# Patient Record
Sex: FEMALE | Race: WHITE | Employment: FULL TIME | URBAN - METROPOLITAN AREA
[De-identification: names, ages, dates, MRNs, and addresses within clinical notes are randomized per-mention and may not be internally consistent; named-entity substitution may affect disease eponyms.]

---

## 2017-01-12 RX ORDER — VALACYCLOVIR HYDROCHLORIDE 1 G/1
TABLET, FILM COATED ORAL
Qty: 36 TABLET | Refills: 1 | Status: SHIPPED | OUTPATIENT
Start: 2017-01-12 | End: 2018-03-12 | Stop reason: SDUPTHER

## 2017-01-25 ENCOUNTER — OFFICE VISIT (OUTPATIENT)
Dept: FAMILY MEDICINE CLINIC | Age: 57
End: 2017-01-25

## 2017-01-25 VITALS
OXYGEN SATURATION: 96 % | TEMPERATURE: 97.5 F | BODY MASS INDEX: 25.17 KG/M2 | HEIGHT: 70 IN | HEART RATE: 72 BPM | DIASTOLIC BLOOD PRESSURE: 62 MMHG | SYSTOLIC BLOOD PRESSURE: 104 MMHG | WEIGHT: 175.8 LBS

## 2017-01-25 DIAGNOSIS — B02.9 HERPES ZOSTER WITHOUT COMPLICATION: Primary | ICD-10-CM

## 2017-01-25 DIAGNOSIS — N95.2 VAGINAL ATROPHY: ICD-10-CM

## 2017-01-25 PROCEDURE — 99213 OFFICE O/P EST LOW 20 MIN: CPT | Performed by: NURSE PRACTITIONER

## 2017-01-25 RX ORDER — ESTRADIOL 10 UG/1
10 INSERT VAGINAL
Qty: 24 TABLET | Refills: 3 | Status: SHIPPED | OUTPATIENT
Start: 2017-01-26 | End: 2017-12-27 | Stop reason: ALTCHOICE

## 2017-01-25 RX ORDER — VALACYCLOVIR HYDROCHLORIDE 1 G/1
1000 TABLET, FILM COATED ORAL 3 TIMES DAILY
Qty: 21 TABLET | Refills: 0 | Status: SHIPPED | OUTPATIENT
Start: 2017-01-25 | End: 2017-02-01

## 2017-01-26 ASSESSMENT — ENCOUNTER SYMPTOMS
EYE PAIN: 0
EYES NEGATIVE: 1
SORE THROAT: 0
DIARRHEA: 0
VOMITING: 0
ALLERGIC/IMMUNOLOGIC NEGATIVE: 1
COUGH: 0
NAIL CHANGES: 0
GASTROINTESTINAL NEGATIVE: 1
RESPIRATORY NEGATIVE: 1
SHORTNESS OF BREATH: 0
RHINORRHEA: 0

## 2017-02-15 ENCOUNTER — OFFICE VISIT (OUTPATIENT)
Dept: FAMILY MEDICINE CLINIC | Age: 57
End: 2017-02-15

## 2017-02-15 VITALS
OXYGEN SATURATION: 94 % | BODY MASS INDEX: 24.71 KG/M2 | HEART RATE: 78 BPM | HEIGHT: 70 IN | WEIGHT: 172.6 LBS | SYSTOLIC BLOOD PRESSURE: 110 MMHG | TEMPERATURE: 97.9 F | DIASTOLIC BLOOD PRESSURE: 64 MMHG

## 2017-02-15 DIAGNOSIS — J10.1 INFLUENZA A: Primary | ICD-10-CM

## 2017-02-15 DIAGNOSIS — J40 BRONCHITIS: ICD-10-CM

## 2017-02-15 LAB
INFLUENZA A ANTIBODY: POSITIVE
INFLUENZA B ANTIBODY: NEGATIVE

## 2017-02-15 PROCEDURE — 87804 INFLUENZA ASSAY W/OPTIC: CPT | Performed by: NURSE PRACTITIONER

## 2017-02-15 PROCEDURE — 94150 VITAL CAPACITY TEST: CPT | Performed by: NURSE PRACTITIONER

## 2017-02-15 PROCEDURE — 94640 AIRWAY INHALATION TREATMENT: CPT | Performed by: NURSE PRACTITIONER

## 2017-02-15 PROCEDURE — 99213 OFFICE O/P EST LOW 20 MIN: CPT | Performed by: NURSE PRACTITIONER

## 2017-02-15 RX ORDER — BROMPHENIRAMINE MALEATE, PSEUDOEPHEDRINE HYDROCHLORIDE, AND DEXTROMETHORPHAN HYDROBROMIDE 2; 30; 10 MG/5ML; MG/5ML; MG/5ML
5 SYRUP ORAL 4 TIMES DAILY PRN
Qty: 200 ML | Refills: 0 | Status: SHIPPED | OUTPATIENT
Start: 2017-02-15 | End: 2017-04-11 | Stop reason: ALTCHOICE

## 2017-02-15 RX ORDER — AZITHROMYCIN 250 MG/1
TABLET, FILM COATED ORAL
Qty: 1 PACKET | Refills: 0 | Status: SHIPPED | OUTPATIENT
Start: 2017-02-15 | End: 2017-02-25

## 2017-02-15 RX ORDER — METHYLPREDNISOLONE 4 MG/1
TABLET ORAL
Qty: 1 KIT | Refills: 0 | Status: SHIPPED | OUTPATIENT
Start: 2017-02-15 | End: 2017-02-21

## 2017-02-15 RX ORDER — ALBUTEROL SULFATE 2.5 MG/3ML
2.5 SOLUTION RESPIRATORY (INHALATION) ONCE
Status: COMPLETED | OUTPATIENT
Start: 2017-02-15 | End: 2017-02-15

## 2017-02-15 RX ORDER — ALBUTEROL SULFATE 90 UG/1
2 AEROSOL, METERED RESPIRATORY (INHALATION) EVERY 6 HOURS PRN
Qty: 1 INHALER | Refills: 3 | Status: SHIPPED | OUTPATIENT
Start: 2017-02-15 | End: 2017-12-27 | Stop reason: ALTCHOICE

## 2017-02-15 RX ADMIN — ALBUTEROL SULFATE 2.5 MG: 2.5 SOLUTION RESPIRATORY (INHALATION) at 13:00

## 2017-02-15 ASSESSMENT — ENCOUNTER SYMPTOMS
CHEST TIGHTNESS: 1
ALLERGIC/IMMUNOLOGIC NEGATIVE: 1
EYES NEGATIVE: 1
VOMITING: 0
SORE THROAT: 0
WHEEZING: 0
COUGH: 1
SHORTNESS OF BREATH: 1
SINUS PAIN: 1
ABDOMINAL PAIN: 0
NAUSEA: 0
DIARRHEA: 0
RHINORRHEA: 1
GASTROINTESTINAL NEGATIVE: 1
SWOLLEN GLANDS: 0

## 2017-03-10 ENCOUNTER — PATIENT MESSAGE (OUTPATIENT)
Dept: FAMILY MEDICINE CLINIC | Age: 57
End: 2017-03-10

## 2017-03-12 RX ORDER — CIPROFLOXACIN 500 MG/1
500 TABLET, FILM COATED ORAL 2 TIMES DAILY
Qty: 14 TABLET | Refills: 0 | Status: SHIPPED | OUTPATIENT
Start: 2017-03-12 | End: 2017-03-19

## 2017-03-12 RX ORDER — DIPHENOXYLATE HYDROCHLORIDE AND ATROPINE SULFATE 2.5; .025 MG/1; MG/1
1 TABLET ORAL 4 TIMES DAILY PRN
Qty: 60 TABLET | Refills: 0 | Status: SHIPPED | OUTPATIENT
Start: 2017-03-12 | End: 2017-04-11 | Stop reason: ALTCHOICE

## 2017-03-13 ENCOUNTER — OFFICE VISIT (OUTPATIENT)
Dept: GYNECOLOGY | Age: 57
End: 2017-03-13

## 2017-03-13 VITALS
SYSTOLIC BLOOD PRESSURE: 114 MMHG | WEIGHT: 172 LBS | BODY MASS INDEX: 25.48 KG/M2 | TEMPERATURE: 98.7 F | HEART RATE: 58 BPM | RESPIRATION RATE: 17 BRPM | HEIGHT: 69 IN | DIASTOLIC BLOOD PRESSURE: 70 MMHG

## 2017-03-13 DIAGNOSIS — Z01.419 WELL WOMAN EXAM WITH ROUTINE GYNECOLOGICAL EXAM: Primary | ICD-10-CM

## 2017-03-13 DIAGNOSIS — Z78.0 MENOPAUSE: ICD-10-CM

## 2017-03-13 PROCEDURE — 99396 PREV VISIT EST AGE 40-64: CPT | Performed by: OBSTETRICS & GYNECOLOGY

## 2017-03-15 LAB
HPV COMMENT: NORMAL
HPV TYPE 16: NOT DETECTED
HPV TYPE 18: NOT DETECTED
HPVOH (OTHER TYPES): NOT DETECTED

## 2017-03-19 ASSESSMENT — ENCOUNTER SYMPTOMS
CHOKING: 1
GASTROINTESTINAL NEGATIVE: 1
EYES NEGATIVE: 1

## 2017-04-11 ENCOUNTER — HOSPITAL ENCOUNTER (OUTPATIENT)
Dept: MAMMOGRAPHY | Age: 57
Discharge: HOME | End: 2017-04-11
Attending: FAMILY MEDICINE | Admitting: FAMILY MEDICINE

## 2017-04-11 ENCOUNTER — OFFICE VISIT (OUTPATIENT)
Dept: FAMILY MEDICINE CLINIC | Age: 57
End: 2017-04-11

## 2017-04-11 VITALS
DIASTOLIC BLOOD PRESSURE: 70 MMHG | BODY MASS INDEX: 23.96 KG/M2 | HEART RATE: 116 BPM | HEIGHT: 70 IN | OXYGEN SATURATION: 99 % | SYSTOLIC BLOOD PRESSURE: 106 MMHG | WEIGHT: 167.4 LBS | TEMPERATURE: 98.1 F

## 2017-04-11 DIAGNOSIS — Z00.00 ROUTINE GENERAL MEDICAL EXAMINATION AT A HEALTH CARE FACILITY: ICD-10-CM

## 2017-04-11 DIAGNOSIS — E55.9 VITAMIN D DEFICIENCY: ICD-10-CM

## 2017-04-11 DIAGNOSIS — R82.90 ABNORMAL URINE FINDINGS: ICD-10-CM

## 2017-04-11 DIAGNOSIS — E78.2 MIXED HYPERLIPIDEMIA: ICD-10-CM

## 2017-04-11 DIAGNOSIS — R05.3 CHRONIC COUGH: ICD-10-CM

## 2017-04-11 DIAGNOSIS — Z12.31 SCREENING MAMMOGRAM, ENCOUNTER FOR: ICD-10-CM

## 2017-04-11 DIAGNOSIS — M22.42 CHONDROMALACIA PATELLAE, LEFT KNEE: ICD-10-CM

## 2017-04-11 DIAGNOSIS — N95.2 ATROPHIC VAGINITIS: ICD-10-CM

## 2017-04-11 DIAGNOSIS — B00.1 RECURRENT COLD SORES: ICD-10-CM

## 2017-04-11 DIAGNOSIS — J30.1 SEASONAL ALLERGIC RHINITIS DUE TO POLLEN: ICD-10-CM

## 2017-04-11 DIAGNOSIS — Z00.00 ROUTINE GENERAL MEDICAL EXAMINATION AT A HEALTH CARE FACILITY: Primary | ICD-10-CM

## 2017-04-11 LAB
A/G RATIO: 1.7 (ref 1.1–2.2)
ALBUMIN SERPL-MCNC: 4.5 G/DL (ref 3.4–5)
ALP BLD-CCNC: 60 U/L (ref 40–129)
ALT SERPL-CCNC: 38 U/L (ref 10–40)
ANION GAP SERPL CALCULATED.3IONS-SCNC: 18 MMOL/L (ref 3–16)
AST SERPL-CCNC: 28 U/L (ref 15–37)
BASOPHILS ABSOLUTE: 0 K/UL (ref 0–0.2)
BASOPHILS RELATIVE PERCENT: 0.7 %
BILIRUB SERPL-MCNC: 0.7 MG/DL (ref 0–1)
BILIRUBIN, POC: NORMAL
BLOOD URINE, POC: NORMAL
BUN BLDV-MCNC: 15 MG/DL (ref 7–20)
CALCIUM SERPL-MCNC: 10.3 MG/DL (ref 8.3–10.6)
CHLORIDE BLD-SCNC: 100 MMOL/L (ref 99–110)
CHOLESTEROL, TOTAL: 162 MG/DL (ref 0–199)
CLARITY, POC: NORMAL
CO2: 25 MMOL/L (ref 21–32)
COLOR, POC: YELLOW
CREAT SERPL-MCNC: 0.8 MG/DL (ref 0.6–1.1)
EOSINOPHILS ABSOLUTE: 0.1 K/UL (ref 0–0.6)
EOSINOPHILS RELATIVE PERCENT: 1.3 %
GFR AFRICAN AMERICAN: >60
GFR NON-AFRICAN AMERICAN: >60
GLOBULIN: 2.7 G/DL
GLUCOSE BLD-MCNC: 81 MG/DL (ref 70–99)
GLUCOSE URINE, POC: NORMAL
HCT VFR BLD CALC: 42.8 % (ref 36–48)
HDLC SERPL-MCNC: 42 MG/DL (ref 40–60)
HEMOGLOBIN: 13.9 G/DL (ref 12–16)
KETONES, POC: NORMAL
LDL CHOLESTEROL CALCULATED: 99 MG/DL
LEUKOCYTE EST, POC: NORMAL
LYMPHOCYTES ABSOLUTE: 1.1 K/UL (ref 1–5.1)
LYMPHOCYTES RELATIVE PERCENT: 23.5 %
MCH RBC QN AUTO: 29.9 PG (ref 26–34)
MCHC RBC AUTO-ENTMCNC: 32.6 G/DL (ref 31–36)
MCV RBC AUTO: 91.6 FL (ref 80–100)
MONOCYTES ABSOLUTE: 0.4 K/UL (ref 0–1.3)
MONOCYTES RELATIVE PERCENT: 8.4 %
NEUTROPHILS ABSOLUTE: 3 K/UL (ref 1.7–7.7)
NEUTROPHILS RELATIVE PERCENT: 66.1 %
NITRITE, POC: NORMAL
PDW BLD-RTO: 13.2 % (ref 12.4–15.4)
PH, POC: 6
PLATELET # BLD: 271 K/UL (ref 135–450)
PMV BLD AUTO: 8.1 FL (ref 5–10.5)
POTASSIUM SERPL-SCNC: 4.5 MMOL/L (ref 3.5–5.1)
PROTEIN, POC: NORMAL
RBC # BLD: 4.67 M/UL (ref 4–5.2)
SODIUM BLD-SCNC: 143 MMOL/L (ref 136–145)
SPECIFIC GRAVITY, POC: 1.01
TOTAL PROTEIN: 7.2 G/DL (ref 6.4–8.2)
TRIGL SERPL-MCNC: 103 MG/DL (ref 0–150)
TSH SERPL DL<=0.05 MIU/L-ACNC: 1.62 UIU/ML (ref 0.27–4.2)
UROBILINOGEN, POC: 0.2
VITAMIN D 25-HYDROXY: 34.6 NG/ML
VLDLC SERPL CALC-MCNC: 21 MG/DL
WBC # BLD: 4.6 K/UL (ref 4–11)

## 2017-04-11 PROCEDURE — 99396 PREV VISIT EST AGE 40-64: CPT | Performed by: FAMILY MEDICINE

## 2017-04-11 PROCEDURE — 81002 URINALYSIS NONAUTO W/O SCOPE: CPT | Performed by: FAMILY MEDICINE

## 2017-04-11 RX ORDER — RANITIDINE 150 MG/1
150 TABLET ORAL 2 TIMES DAILY
Qty: 60 TABLET | Refills: 3 | Status: SHIPPED | OUTPATIENT
Start: 2017-04-11 | End: 2017-12-27 | Stop reason: ALTCHOICE

## 2017-04-11 ASSESSMENT — ENCOUNTER SYMPTOMS
COUGH: 1
GASTROINTESTINAL NEGATIVE: 1
ALLERGIC/IMMUNOLOGIC NEGATIVE: 1
APNEA: 0
WHEEZING: 0
CHEST TIGHTNESS: 0
SHORTNESS OF BREATH: 0
STRIDOR: 0
EYES NEGATIVE: 1
CHOKING: 0

## 2017-04-12 DIAGNOSIS — E78.2 MIXED HYPERLIPIDEMIA: Primary | ICD-10-CM

## 2017-04-12 RX ORDER — ROSUVASTATIN CALCIUM 20 MG/1
20 TABLET, COATED ORAL DAILY
Qty: 90 TABLET | Refills: 1 | Status: SHIPPED | OUTPATIENT
Start: 2017-04-12 | End: 2018-02-12 | Stop reason: SDUPTHER

## 2017-04-13 LAB — URINE CULTURE, ROUTINE: NORMAL

## 2017-05-09 ENCOUNTER — OFFICE VISIT (OUTPATIENT)
Dept: DERMATOLOGY | Age: 57
End: 2017-05-09

## 2017-05-09 DIAGNOSIS — D22.9 MULTIPLE NEVI: Primary | ICD-10-CM

## 2017-05-09 DIAGNOSIS — Z87.2 HISTORY OF ACTINIC KERATOSES: ICD-10-CM

## 2017-05-09 DIAGNOSIS — L82.1 SEBORRHEIC KERATOSIS: ICD-10-CM

## 2017-05-09 DIAGNOSIS — D17.1 LIPOMA OF BACK: ICD-10-CM

## 2017-05-09 DIAGNOSIS — B00.9 HSV INFECTION: ICD-10-CM

## 2017-05-09 DIAGNOSIS — L90.5 SCAR: ICD-10-CM

## 2017-05-09 PROCEDURE — 99214 OFFICE O/P EST MOD 30 MIN: CPT | Performed by: DERMATOLOGY

## 2017-07-17 ENCOUNTER — OFFICE VISIT (OUTPATIENT)
Dept: SURGERY | Age: 57
End: 2017-07-17

## 2017-07-17 VITALS
SYSTOLIC BLOOD PRESSURE: 118 MMHG | WEIGHT: 168 LBS | DIASTOLIC BLOOD PRESSURE: 60 MMHG | HEIGHT: 70 IN | BODY MASS INDEX: 24.05 KG/M2

## 2017-07-17 DIAGNOSIS — L29.0 ANAL ITCH: Primary | ICD-10-CM

## 2017-07-17 PROCEDURE — 99203 OFFICE O/P NEW LOW 30 MIN: CPT | Performed by: SURGERY

## 2017-07-17 RX ORDER — CLOBETASOL PROPIONATE 0.5 MG/G
OINTMENT TOPICAL
Qty: 15 G | Refills: 1 | Status: SHIPPED | OUTPATIENT
Start: 2017-07-17 | End: 2017-12-27 | Stop reason: ALTCHOICE

## 2017-08-28 ENCOUNTER — OFFICE VISIT (OUTPATIENT)
Dept: SURGERY | Age: 57
End: 2017-08-28

## 2017-08-28 VITALS
WEIGHT: 170.1 LBS | BODY MASS INDEX: 24.35 KG/M2 | HEIGHT: 70 IN | DIASTOLIC BLOOD PRESSURE: 64 MMHG | SYSTOLIC BLOOD PRESSURE: 118 MMHG

## 2017-08-28 DIAGNOSIS — L28.0 LICHEN SIMPLEX CHRONICUS: Primary | ICD-10-CM

## 2017-08-28 PROCEDURE — 99212 OFFICE O/P EST SF 10 MIN: CPT | Performed by: SURGERY

## 2017-12-12 ENCOUNTER — OFFICE VISIT (OUTPATIENT)
Dept: URGENT CARE | Age: 57
End: 2017-12-12

## 2017-12-12 VITALS
SYSTOLIC BLOOD PRESSURE: 104 MMHG | RESPIRATION RATE: 16 BRPM | HEART RATE: 64 BPM | TEMPERATURE: 96.9 F | DIASTOLIC BLOOD PRESSURE: 71 MMHG | BODY MASS INDEX: 24.48 KG/M2 | WEIGHT: 171 LBS | HEIGHT: 70 IN

## 2017-12-12 DIAGNOSIS — R07.9 CHEST PAIN, UNSPECIFIED TYPE: Primary | ICD-10-CM

## 2017-12-12 PROCEDURE — 99214 OFFICE O/P EST MOD 30 MIN: CPT | Performed by: EMERGENCY MEDICINE

## 2017-12-12 PROCEDURE — 93000 ELECTROCARDIOGRAM COMPLETE: CPT | Performed by: EMERGENCY MEDICINE

## 2017-12-12 ASSESSMENT — ENCOUNTER SYMPTOMS
SHORTNESS OF BREATH: 1
ABDOMINAL PAIN: 0
NAUSEA: 0
VOMITING: 0

## 2017-12-12 NOTE — PROGRESS NOTES
Subjective:      Patient ID: Haylie Butterfield is a 62 y.o. female. Chest Pain    This is a new problem. Episode onset: 2 days ago. The onset quality is gradual. The problem occurs intermittently. The problem has been waxing and waning. The pain is mild. The quality of the pain is described as sharp. The pain does not radiate. Associated symptoms include dizziness, malaise/fatigue and shortness of breath. Pertinent negatives include no abdominal pain, fever, nausea or vomiting. The pain is aggravated by nothing. She has tried nothing for the symptoms. Her family medical history is significant for CAD. Review of Systems   Constitutional: Positive for malaise/fatigue. Negative for fever. Respiratory: Positive for shortness of breath. Cardiovascular: Positive for chest pain. Gastrointestinal: Negative for abdominal pain, nausea and vomiting. Neurological: Positive for dizziness. All other systems reviewed and are negative. Objective:   Physical Exam   Constitutional: She is oriented to person, place, and time. She appears well-developed and well-nourished. No distress. HENT:   Head: Normocephalic and atraumatic. Right Ear: External ear normal.   Left Ear: External ear normal.   Nose: Nose normal.   Mouth/Throat: Oropharynx is clear and moist.   Eyes: Conjunctivae and EOM are normal. Pupils are equal, round, and reactive to light. Neck: Normal range of motion. Neck supple. No JVD present. Cardiovascular: Normal rate, regular rhythm, normal heart sounds and intact distal pulses. No murmur heard. Pulmonary/Chest: Effort normal and breath sounds normal. No respiratory distress. She has no wheezes. She has no rales. Abdominal: Soft. Bowel sounds are normal. She exhibits no distension. There is no tenderness. There is no rebound and no guarding. Musculoskeletal: Normal range of motion. She exhibits no edema. Lymphadenopathy:     She has no cervical adenopathy.    Neurological: She is alert and oriented to person, place, and time. She has normal reflexes. No cranial nerve deficit. She exhibits normal muscle tone. Skin: Skin is warm and dry. No rash noted. She is not diaphoretic. No erythema. Psychiatric: She has a normal mood and affect. Her behavior is normal. Judgment and thought content normal.   Nursing note and vitals reviewed. Assessment:      1. Chest pain, unspecified type            Plan:      Armani Macedo was seen today for chest pain. Diagnoses and all orders for this visit:    Chest pain, unspecified type  -     EKG 12 Lead      EKG on my review shows normal sinus rhythm rate 58, no acute ischemic changes. Patient advised that her medical problem was beyond the scope of what I could address in a convenience care clinic setting. Risks and benefits of seeking further treatment was discussed at length with the patient at bedside. Patient verbalized her understanding of the risks and benefits to myself. Patient was advised to go directly to the emergency department for further evaluation. EMS transfer to the emergency department was declined by the patient. Patient to go to [facility] ED by private vehicle.

## 2017-12-27 ENCOUNTER — OFFICE VISIT (OUTPATIENT)
Dept: URGENT CARE | Age: 57
End: 2017-12-27

## 2017-12-27 VITALS
RESPIRATION RATE: 12 BRPM | DIASTOLIC BLOOD PRESSURE: 68 MMHG | HEART RATE: 82 BPM | HEIGHT: 70 IN | TEMPERATURE: 98.9 F | BODY MASS INDEX: 24.88 KG/M2 | SYSTOLIC BLOOD PRESSURE: 106 MMHG | WEIGHT: 173.8 LBS

## 2017-12-27 DIAGNOSIS — J06.9 ACUTE URI: Primary | ICD-10-CM

## 2017-12-27 DIAGNOSIS — R05.9 COUGH: ICD-10-CM

## 2017-12-27 LAB
INFLUENZA A ANTIBODY: NEGATIVE
INFLUENZA B ANTIBODY: NEGATIVE

## 2017-12-27 PROCEDURE — 99214 OFFICE O/P EST MOD 30 MIN: CPT | Performed by: EMERGENCY MEDICINE

## 2017-12-27 PROCEDURE — 87804 INFLUENZA ASSAY W/OPTIC: CPT | Performed by: EMERGENCY MEDICINE

## 2017-12-27 RX ORDER — BENZONATATE 200 MG/1
200 CAPSULE ORAL 3 TIMES DAILY PRN
Qty: 15 CAPSULE | Refills: 0 | Status: SHIPPED | OUTPATIENT
Start: 2017-12-27 | End: 2018-05-14 | Stop reason: ALTCHOICE

## 2017-12-27 ASSESSMENT — ENCOUNTER SYMPTOMS
EYE PAIN: 0
SINUS PAIN: 0
TROUBLE SWALLOWING: 0
DIARRHEA: 0
SORE THROAT: 1
ABDOMINAL PAIN: 0
NAUSEA: 0
RHINORRHEA: 1
VOMITING: 0
EYE DISCHARGE: 0
SHORTNESS OF BREATH: 0
WHEEZING: 1
COUGH: 1

## 2017-12-27 NOTE — PROGRESS NOTES
Reason for Visit:   Chief Complaint   Patient presents with    Cough     Cough since friday, low grade fever, runny nose, headache, achy began yesterday     Patient History     HPI:    URI    This is a new problem. Episode onset: 5 days ago. The problem has been gradually worsening. Maximum temperature: 101 F. Associated symptoms include congestion, coughing, headaches, rhinorrhea, a sore throat and wheezing. Pertinent negatives include no abdominal pain, chest pain, diarrhea, dysuria, ear pain, nausea, rash, sinus pain or vomiting. Treatments tried: Dayquil, Nyquil, Tylenol, lozenges. The treatment provided mild relief. Past Medical History:   Diagnosis Date    Chicken pox     Dyspareunia     HPV (human papilloma virus) anogenital infection     Recurrent cold sores     STD (sexually transmitted disease)     HPV       Past Surgical History:   Procedure Laterality Date    COLPOSCOPY      LEEP      SKIN TAG REMOVAL      TOOTH EXTRACTION         Allergies: Allergies   Allergen Reactions    Penicillins Hives    Sulfa Antibiotics Hives         Review of Systems       Review of Systems   Constitutional: Positive for chills, fatigue and fever. HENT: Positive for congestion, postnasal drip, rhinorrhea and sore throat. Negative for ear pain, sinus pain and trouble swallowing. Eyes: Negative for pain, discharge and visual disturbance. Respiratory: Positive for cough and wheezing. Negative for shortness of breath. Cardiovascular: Negative for chest pain. Gastrointestinal: Negative for abdominal pain, diarrhea, nausea and vomiting. Genitourinary: Negative for difficulty urinating, dysuria and frequency. Musculoskeletal: Positive for myalgias. Skin: Negative for rash. Neurological: Positive for headaches.        Physical Exam     Vitals:    12/27/17 1030   BP: 106/68   Pulse: 82   Resp: 12   Temp: 98.9 °F (37.2 °C)       Physical (cough)     Dispense:  15 capsule     Refill:  0    dextromethorphan-guaiFENesin (TUSSIN DM MAX ADULT)  MG/5ML LIQD syrup     Sig: Take 10 mLs by mouth every 4 hours as needed (cough)     Dispense:  118 mL     Refill:  0         Patient Instructions     RX: TESSALON PERLES, TUSSIN DM MAX    Use Tylenol or Ibuprofen over the counter (may alternate every 4 hours) as needed for pain/fever    Follow-up with your primary care physician in 1 week if not improving. If you do not have a primary care physician, call [phone] for a referral or visit [email or web address]      Patient Education        Upper Respiratory Infection (Cold): Care Instructions  Your Care Instructions    An upper respiratory infection, or URI, is an infection of the nose, sinuses, or throat. URIs are spread by coughs, sneezes, and direct contact. The common cold is the most frequent kind of URI. The flu and sinus infections are other kinds of URIs. Almost all URIs are caused by viruses. Antibiotics won't cure them. But you can treat most infections with home care. This may include drinking lots of fluids and taking over-the-counter pain medicine. You will probably feel better in 4 to 10 days. The doctor has checked you carefully, but problems can develop later. If you notice any problems or new symptoms, get medical treatment right away. Follow-up care is a key part of your treatment and safety. Be sure to make and go to all appointments, and call your doctor if you are having problems. It's also a good idea to know your test results and keep a list of the medicines you take. How can you care for yourself at home? · To prevent dehydration, drink plenty of fluids, enough so that your urine is light yellow or clear like water. Choose water and other caffeine-free clear liquids until you feel better.  If you have kidney, heart, or liver disease and have to limit fluids, talk with your doctor before you increase the amount of fluids you drink. · Take an over-the-counter pain medicine, such as acetaminophen (Tylenol), ibuprofen (Advil, Motrin), or naproxen (Aleve). Read and follow all instructions on the label. · Before you use cough and cold medicines, check the label. These medicines may not be safe for young children or for people with certain health problems. · Be careful when taking over-the-counter cold or flu medicines and Tylenol at the same time. Many of these medicines have acetaminophen, which is Tylenol. Read the labels to make sure that you are not taking more than the recommended dose. Too much acetaminophen (Tylenol) can be harmful. · Get plenty of rest.  · Do not smoke or allow others to smoke around you. If you need help quitting, talk to your doctor about stop-smoking programs and medicines. These can increase your chances of quitting for good. When should you call for help? Call 911 anytime you think you may need emergency care. For example, call if:  ? · You have severe trouble breathing. ?Call your doctor now or seek immediate medical care if:  ? · You seem to be getting much sicker. ? · You have new or worse trouble breathing. ? · You have a new or higher fever. ? · You have a new rash. ? Watch closely for changes in your health, and be sure to contact your doctor if:  ? · You have a new symptom, such as a sore throat, an earache, or sinus pain. ? · You cough more deeply or more often, especially if you notice more mucus or a change in the color of your mucus. ? · You do not get better as expected. Where can you learn more? Go to https://[email or web address] org and sign in to your [employer] account. Enter P326 in the [employer] box to learn more about \"Upper Respiratory Infection (Cold): Care Instructions. \"     If you do not have an account, please click on the \"Sign Up Now\" link. Current as of: May 12, 2017  Content Version: 11.4  © 8612-2862 Healthwise, [facility].  Care blood.   ? · You have new or worse trouble breathing. ? · You have a new or higher fever. ? · You have a new rash. ? Watch closely for changes in your health, and be sure to contact your doctor if:  ? · You cough more deeply or more often, especially if you notice more mucus or a change in the color of your mucus. ? · You have new symptoms, such as a sore throat, an earache, or sinus pain. ? · You do not get better as expected. Where can you learn more? Go to https://[employer]. org and sign in to your [employer] account. Enter D279 in the [employer] box to learn more about \"Cough: Care Instructions. \"     If you do not have an account, please click on the \"Sign Up Now\" link. Current as of: May 12, 2017  Content Version: 11.4  © 7701-8765 Healthwise, Inc[employer]. Care instructions adapted under license by [facility] ([facility]). If you have questions about a medical condition or this instruction, always ask your healthcare professional. [address] any warranty or liability for your use of this information.

## 2017-12-27 NOTE — PATIENT INSTRUCTIONS
RX: TESSALON PERLES, TUSSIN DM MAX    Use Tylenol or Ibuprofen over the counter (may alternate every 4 hours) as needed for pain/fever    Follow-up with your primary care physician in 1 week if not improving. If you do not have a primary care physician, call [phone] for a referral or visit [email or web address]      Patient Education        Upper Respiratory Infection (Cold): Care Instructions  Your Care Instructions    An upper respiratory infection, or URI, is an infection of the nose, sinuses, or throat. URIs are spread by coughs, sneezes, and direct contact. The common cold is the most frequent kind of URI. The flu and sinus infections are other kinds of URIs. Almost all URIs are caused by viruses. Antibiotics won't cure them. But you can treat most infections with home care. This may include drinking lots of fluids and taking over-the-counter pain medicine. You will probably feel better in 4 to 10 days. The doctor has checked you carefully, but problems can develop later. If you notice any problems or new symptoms, get medical treatment right away. Follow-up care is a key part of your treatment and safety. Be sure to make and go to all appointments, and call your doctor if you are having problems. It's also a good idea to know your test results and keep a list of the medicines you take. How can you care for yourself at home? · To prevent dehydration, drink plenty of fluids, enough so that your urine is light yellow or clear like water. Choose water and other caffeine-free clear liquids until you feel better. If you have kidney, heart, or liver disease and have to limit fluids, talk with your doctor before you increase the amount of fluids you drink. · Take an over-the-counter pain medicine, such as acetaminophen (Tylenol), ibuprofen (Advil, Motrin), or naproxen (Aleve). Read and follow all instructions on the label. · Before you use cough and cold medicines, check the label.  These medicines may not be safe for young children or for people with certain health problems. · Be careful when taking over-the-counter cold or flu medicines and Tylenol at the same time. Many of these medicines have acetaminophen, which is Tylenol. Read the labels to make sure that you are not taking more than the recommended dose. Too much acetaminophen (Tylenol) can be harmful. · Get plenty of rest.  · Do not smoke or allow others to smoke around you. If you need help quitting, talk to your doctor about stop-smoking programs and medicines. These can increase your chances of quitting for good. When should you call for help? Call 911 anytime you think you may need emergency care. For example, call if:  ? · You have severe trouble breathing. ?Call your doctor now or seek immediate medical care if:  ? · You seem to be getting much sicker. ? · You have new or worse trouble breathing. ? · You have a new or higher fever. ? · You have a new rash. ? Watch closely for changes in your health, and be sure to contact your doctor if:  ? · You have a new symptom, such as a sore throat, an earache, or sinus pain. ? · You cough more deeply or more often, especially if you notice more mucus or a change in the color of your mucus. ? · You do not get better as expected. Where can you learn more? Go to [email or web address]://[email or web address] [employer]heb.[email or web address] org and sign in to your [employer] account. Enter Z456 in the [employer] box to learn more about \"Upper Respiratory Infection (Cold): Care Instructions. \"     If you do not have an account, please click on the \"Sign Up Now\" link. Current as of: May 12, 2017  Content Version: 11.4  © 0617-3146 Healthwise, [employer]. Care instructions adapted under license by [facility] ([facility]). If you have questions about a medical condition or this instruction, always ask your healthcare professional. [address] any warranty or liability for your use of this information.

## 2018-01-23 ENCOUNTER — PATIENT MESSAGE (OUTPATIENT)
Dept: FAMILY MEDICINE CLINIC | Age: 58
End: 2018-01-23

## 2018-01-23 DIAGNOSIS — R05.3 CHRONIC COUGH: Primary | ICD-10-CM

## 2018-01-23 PROCEDURE — 99444 PR PHYSICIAN ONLINE EVALUATION & MANAGEMENT SERVICE: CPT | Performed by: FAMILY MEDICINE

## 2018-01-24 RX ORDER — DOXYCYCLINE HYCLATE 100 MG
100 TABLET ORAL 2 TIMES DAILY
Qty: 20 TABLET | Refills: 0 | Status: SHIPPED | OUTPATIENT
Start: 2018-01-24 | End: 2018-02-03

## 2018-01-24 RX ORDER — METHYLPREDNISOLONE 4 MG/1
4 TABLET ORAL SEE ADMIN INSTRUCTIONS
Qty: 1 KIT | Refills: 0 | Status: SHIPPED | OUTPATIENT
Start: 2018-01-24 | End: 2018-01-30

## 2018-02-12 DIAGNOSIS — E78.2 MIXED HYPERLIPIDEMIA: ICD-10-CM

## 2018-02-12 RX ORDER — ROSUVASTATIN CALCIUM 20 MG/1
TABLET, COATED ORAL
Qty: 60 TABLET | Refills: 0 | Status: SHIPPED | OUTPATIENT
Start: 2018-02-12 | End: 2018-04-24 | Stop reason: SDUPTHER

## 2018-02-16 ENCOUNTER — OFFICE VISIT (OUTPATIENT)
Dept: ORTHOPEDIC SURGERY | Age: 58
End: 2018-02-16

## 2018-02-16 VITALS — WEIGHT: 165 LBS | BODY MASS INDEX: 24.44 KG/M2 | HEIGHT: 69 IN

## 2018-02-16 DIAGNOSIS — M25.562 LEFT KNEE PAIN, UNSPECIFIED CHRONICITY: Primary | ICD-10-CM

## 2018-02-16 DIAGNOSIS — M22.42 CHONDROMALACIA PATELLAE, LEFT KNEE: ICD-10-CM

## 2018-02-16 PROCEDURE — 20610 DRAIN/INJ JOINT/BURSA W/O US: CPT | Performed by: ORTHOPAEDIC SURGERY

## 2018-02-16 PROCEDURE — 99203 OFFICE O/P NEW LOW 30 MIN: CPT | Performed by: ORTHOPAEDIC SURGERY

## 2018-02-16 NOTE — PROGRESS NOTES
Chief Complaint    Knee Pain (left knee)      History of Present Illness:  Jose G Angel is a 62 y.o. female. She is here for evaluation of her left knee. She's had a flareup of her left knee pain over last 2 weeks. 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 This is back in 2014 and she was treated with therapy and a cortisone injection and this did resolve. She denies any new injury the knee. She does work at the [employer] in sits for long periods of time therefore has a lot of agitation while sitting. She also does have pain with stairs. Also had a fairly large Baker cyst previously. She has been doing some physical therapy with Lizzy Almazan has been to therapy sessions total.           Medical History:  Patient's medications, allergies, past medical, surgical, social and family histories were reviewed and updated as appropriate. Review of Systems:  Pertinent items are noted in HPI  Review of systems reviewed from Patient History Form dated on 2/16/18 and available in the patient's chart under the Media tab. Vital Signs:  Ht 5' 9\" (1.753 m)   Wt 165 lb (74.8 kg)   LMP 07/28/2012   BMI 24.37 kg/m²     General Exam:   Constitutional: Patient is adequately groomed with no evidence of malnutrition  DTRs: Deep tendon reflexes are intact  Mental Status: The patient is oriented to time, place and person. The patient's mood and affect are appropriate. Knee Examination:    Inspection:  No significant swelling erythema noted about the left knee today    Palpation:  There is mild palpable effusion within the left knee. There is a palpable Baker cyst within a pop to fossa. There is some tenderness palpation along the medial joint line.   No lateral joint line tenderness    Range of Motion:  Extension of the knee is to 0°, any flexion is up to 130°    Strength:  She is able to do a straight leg

## 2018-03-12 DIAGNOSIS — B00.1 RECURRENT COLD SORES: Primary | ICD-10-CM

## 2018-03-12 RX ORDER — VALACYCLOVIR HYDROCHLORIDE 1 G/1
TABLET, FILM COATED ORAL
Qty: 36 TABLET | Refills: 1 | Status: SHIPPED | OUTPATIENT
Start: 2018-03-12 | End: 2019-05-15 | Stop reason: SDUPTHER

## 2018-04-13 ENCOUNTER — HOSPITAL ENCOUNTER (OUTPATIENT)
Dept: MAMMOGRAPHY | Age: 58
Discharge: HOME | End: 2018-04-13
Attending: FAMILY MEDICINE | Admitting: FAMILY MEDICINE

## 2018-04-13 DIAGNOSIS — Z12.31 VISIT FOR SCREENING MAMMOGRAM: ICD-10-CM

## 2018-04-17 ENCOUNTER — EMPLOYEE WELLNESS (OUTPATIENT)
Dept: OTHER | Age: 58
End: 2018-04-17

## 2018-04-17 LAB
CHOLESTEROL, TOTAL: 162 MG/DL (ref 0–199)
GLUCOSE BLD-MCNC: 75 MG/DL (ref 70–99)
HDLC SERPL-MCNC: 50 MG/DL (ref 40–60)
LDL CHOLESTEROL CALCULATED: 87 MG/DL
TRIGL SERPL-MCNC: 126 MG/DL (ref 0–150)

## 2018-04-23 VITALS — WEIGHT: 174 LBS | BODY MASS INDEX: 25.7 KG/M2

## 2018-04-24 DIAGNOSIS — E78.2 MIXED HYPERLIPIDEMIA: ICD-10-CM

## 2018-04-24 RX ORDER — ROSUVASTATIN CALCIUM 20 MG/1
TABLET, COATED ORAL
Qty: 30 TABLET | Refills: 0 | Status: SHIPPED | OUTPATIENT
Start: 2018-04-24 | End: 2018-05-03 | Stop reason: SDUPTHER

## 2018-05-03 ENCOUNTER — PATIENT MESSAGE (OUTPATIENT)
Dept: FAMILY MEDICINE CLINIC | Age: 58
End: 2018-05-03

## 2018-05-03 DIAGNOSIS — E78.2 MIXED HYPERLIPIDEMIA: Primary | ICD-10-CM

## 2018-05-03 RX ORDER — ROSUVASTATIN CALCIUM 20 MG/1
20 TABLET, COATED ORAL DAILY
Qty: 90 TABLET | Refills: 1 | Status: SHIPPED | OUTPATIENT
Start: 2018-05-03 | End: 2018-05-14 | Stop reason: SDUPTHER

## 2018-05-07 DIAGNOSIS — M22.42 CHONDROMALACIA PATELLAE, LEFT KNEE: Primary | ICD-10-CM

## 2018-05-08 ENCOUNTER — TELEPHONE (OUTPATIENT)
Dept: ORTHOPEDIC SURGERY | Age: 58
End: 2018-05-08

## 2018-05-14 ENCOUNTER — OFFICE VISIT (OUTPATIENT)
Dept: FAMILY MEDICINE CLINIC | Age: 58
End: 2018-05-14

## 2018-05-14 VITALS
DIASTOLIC BLOOD PRESSURE: 82 MMHG | OXYGEN SATURATION: 100 % | HEIGHT: 70 IN | TEMPERATURE: 97.8 F | WEIGHT: 174.6 LBS | BODY MASS INDEX: 25 KG/M2 | SYSTOLIC BLOOD PRESSURE: 114 MMHG

## 2018-05-14 DIAGNOSIS — E55.9 VITAMIN D DEFICIENCY: ICD-10-CM

## 2018-05-14 DIAGNOSIS — R82.90 ABNORMAL URINE FINDINGS: ICD-10-CM

## 2018-05-14 DIAGNOSIS — Z00.00 ROUTINE GENERAL MEDICAL EXAMINATION AT A HEALTH CARE FACILITY: Primary | ICD-10-CM

## 2018-05-14 DIAGNOSIS — M22.42 CHONDROMALACIA PATELLAE, LEFT KNEE: ICD-10-CM

## 2018-05-14 DIAGNOSIS — J30.1 CHRONIC SEASONAL ALLERGIC RHINITIS DUE TO POLLEN: ICD-10-CM

## 2018-05-14 DIAGNOSIS — N95.2 ATROPHIC VAGINITIS: ICD-10-CM

## 2018-05-14 DIAGNOSIS — Z00.00 ROUTINE GENERAL MEDICAL EXAMINATION AT A HEALTH CARE FACILITY: ICD-10-CM

## 2018-05-14 DIAGNOSIS — B00.1 RECURRENT COLD SORES: ICD-10-CM

## 2018-05-14 DIAGNOSIS — L70.0 ACNE VULGARIS: ICD-10-CM

## 2018-05-14 DIAGNOSIS — E78.2 MIXED HYPERLIPIDEMIA: ICD-10-CM

## 2018-05-14 LAB
BASOPHILS ABSOLUTE: 0 K/UL (ref 0–0.2)
BASOPHILS RELATIVE PERCENT: 0.4 %
BILIRUBIN, POC: ABNORMAL
BLOOD URINE, POC: ABNORMAL
CLARITY, POC: ABNORMAL
COLOR, POC: YELLOW
EOSINOPHILS ABSOLUTE: 0.1 K/UL (ref 0–0.6)
EOSINOPHILS RELATIVE PERCENT: 1.6 %
GLUCOSE URINE, POC: ABNORMAL
HCT VFR BLD CALC: 41.3 % (ref 36–48)
HEMOGLOBIN: 14 G/DL (ref 12–16)
KETONES, POC: ABNORMAL
LEUKOCYTE EST, POC: ABNORMAL
LYMPHOCYTES ABSOLUTE: 1.5 K/UL (ref 1–5.1)
LYMPHOCYTES RELATIVE PERCENT: 30.5 %
MCH RBC QN AUTO: 31 PG (ref 26–34)
MCHC RBC AUTO-ENTMCNC: 33.8 G/DL (ref 31–36)
MCV RBC AUTO: 91.8 FL (ref 80–100)
MONOCYTES ABSOLUTE: 0.5 K/UL (ref 0–1.3)
MONOCYTES RELATIVE PERCENT: 9.2 %
NEUTROPHILS ABSOLUTE: 2.9 K/UL (ref 1.7–7.7)
NEUTROPHILS RELATIVE PERCENT: 58.3 %
NITRITE, POC: ABNORMAL
PDW BLD-RTO: 12.8 % (ref 12.4–15.4)
PH, POC: 7
PLATELET # BLD: 241 K/UL (ref 135–450)
PMV BLD AUTO: 8.5 FL (ref 5–10.5)
PROTEIN, POC: ABNORMAL
RBC # BLD: 4.5 M/UL (ref 4–5.2)
SPECIFIC GRAVITY, POC: 1
UROBILINOGEN, POC: 0.2
WBC # BLD: 5 K/UL (ref 4–11)

## 2018-05-14 PROCEDURE — 81002 URINALYSIS NONAUTO W/O SCOPE: CPT | Performed by: FAMILY MEDICINE

## 2018-05-14 PROCEDURE — 99396 PREV VISIT EST AGE 40-64: CPT | Performed by: FAMILY MEDICINE

## 2018-05-14 RX ORDER — ROSUVASTATIN CALCIUM 20 MG/1
20 TABLET, COATED ORAL DAILY
Qty: 90 TABLET | Refills: 3 | Status: SHIPPED | OUTPATIENT
Start: 2018-05-14 | End: 2018-12-18 | Stop reason: SDUPTHER

## 2018-05-14 ASSESSMENT — PATIENT HEALTH QUESTIONNAIRE - PHQ9
SUM OF ALL RESPONSES TO PHQ QUESTIONS 1-9: 0
2. FEELING DOWN, DEPRESSED OR HOPELESS: 0
1. LITTLE INTEREST OR PLEASURE IN DOING THINGS: 0
SUM OF ALL RESPONSES TO PHQ9 QUESTIONS 1 & 2: 0

## 2018-05-14 ASSESSMENT — ENCOUNTER SYMPTOMS
RESPIRATORY NEGATIVE: 1
ALLERGIC/IMMUNOLOGIC NEGATIVE: 1
EYES NEGATIVE: 1
GASTROINTESTINAL NEGATIVE: 1

## 2018-05-15 ENCOUNTER — HOSPITAL ENCOUNTER (OUTPATIENT)
Dept: MRI IMAGING | Age: 58
Discharge: HOME | End: 2018-05-15
Attending: ORTHOPAEDIC SURGERY | Admitting: ORTHOPAEDIC SURGERY

## 2018-05-15 DIAGNOSIS — M22.42 CHONDROMALACIA PATELLAE, LEFT KNEE: ICD-10-CM

## 2018-05-15 DIAGNOSIS — M22.42 CHONDROMALACIA PATELLAE OF LEFT KNEE: ICD-10-CM

## 2018-05-15 LAB
A/G RATIO: 1.9 (ref 1.1–2.2)
ALBUMIN SERPL-MCNC: 4.7 G/DL (ref 3.4–5)
ALP BLD-CCNC: 62 U/L (ref 40–129)
ALT SERPL-CCNC: 18 U/L (ref 10–40)
ANION GAP SERPL CALCULATED.3IONS-SCNC: 13 MMOL/L (ref 3–16)
AST SERPL-CCNC: 21 U/L (ref 15–37)
BILIRUB SERPL-MCNC: 0.6 MG/DL (ref 0–1)
BUN BLDV-MCNC: 11 MG/DL (ref 7–20)
CALCIUM SERPL-MCNC: 9.9 MG/DL (ref 8.3–10.6)
CHLORIDE BLD-SCNC: 100 MMOL/L (ref 99–110)
CO2: 28 MMOL/L (ref 21–32)
CREAT SERPL-MCNC: 0.7 MG/DL (ref 0.6–1.1)
GFR AFRICAN AMERICAN: >60
GFR NON-AFRICAN AMERICAN: >60
GLOBULIN: 2.5 G/DL
GLUCOSE BLD-MCNC: 84 MG/DL (ref 70–99)
POTASSIUM SERPL-SCNC: 4.3 MMOL/L (ref 3.5–5.1)
SODIUM BLD-SCNC: 141 MMOL/L (ref 136–145)
TOTAL PROTEIN: 7.2 G/DL (ref 6.4–8.2)
TSH SERPL DL<=0.05 MIU/L-ACNC: 2.23 UIU/ML (ref 0.27–4.2)

## 2018-05-16 LAB — URINE CULTURE, ROUTINE: NORMAL

## 2018-05-29 ENCOUNTER — OFFICE VISIT (OUTPATIENT)
Dept: ORTHOPEDIC SURGERY | Age: 58
End: 2018-05-29

## 2018-05-29 VITALS — BODY MASS INDEX: 25 KG/M2 | HEIGHT: 70 IN | WEIGHT: 174.6 LBS

## 2018-05-29 DIAGNOSIS — S83.232D COMPLEX TEAR OF MEDIAL MENISCUS OF LEFT KNEE AS CURRENT INJURY, SUBSEQUENT ENCOUNTER: Primary | ICD-10-CM

## 2018-05-29 PROCEDURE — 99213 OFFICE O/P EST LOW 20 MIN: CPT | Performed by: ORTHOPAEDIC SURGERY

## 2018-06-18 ENCOUNTER — OFFICE VISIT (OUTPATIENT)
Dept: DERMATOLOGY | Age: 58
End: 2018-06-18

## 2018-06-18 DIAGNOSIS — D17.1 LIPOMA OF BACK: ICD-10-CM

## 2018-06-18 DIAGNOSIS — L82.1 SEBORRHEIC KERATOSIS: ICD-10-CM

## 2018-06-18 DIAGNOSIS — B00.9 HSV INFECTION: ICD-10-CM

## 2018-06-18 DIAGNOSIS — D22.9 MULTIPLE NEVI: Primary | ICD-10-CM

## 2018-06-18 PROCEDURE — 99214 OFFICE O/P EST MOD 30 MIN: CPT | Performed by: DERMATOLOGY

## 2018-06-20 ENCOUNTER — TELEPHONE (OUTPATIENT)
Dept: DERMATOLOGY | Age: 58
End: 2018-06-20

## 2018-07-02 ENCOUNTER — TELEPHONE (OUTPATIENT)
Dept: ORTHOPEDIC SURGERY | Age: 58
End: 2018-07-02

## 2018-07-02 ENCOUNTER — TELEPHONE (OUTPATIENT)
Dept: FAMILY MEDICINE CLINIC | Age: 58
End: 2018-07-02

## 2018-07-02 NOTE — TELEPHONE ENCOUNTER
[facility] on behalf of PT. She states that her labs were not billed as PREVENTATIVE. She requests that lab and urine culture be changed from Screening (Z code) to preventative diagnosis code.

## 2018-07-23 ENCOUNTER — OFFICE VISIT (OUTPATIENT)
Dept: FAMILY MEDICINE CLINIC | Age: 58
End: 2018-07-23

## 2018-07-23 VITALS
BODY MASS INDEX: 27.15 KG/M2 | WEIGHT: 173 LBS | HEIGHT: 67 IN | TEMPERATURE: 98.4 F | SYSTOLIC BLOOD PRESSURE: 114 MMHG | OXYGEN SATURATION: 96 % | HEART RATE: 66 BPM | DIASTOLIC BLOOD PRESSURE: 62 MMHG

## 2018-07-23 DIAGNOSIS — M22.42 CHONDROMALACIA PATELLAE, LEFT KNEE: ICD-10-CM

## 2018-07-23 DIAGNOSIS — S83.282A ACUTE LATERAL MENISCAL TEAR, LEFT, INITIAL ENCOUNTER: ICD-10-CM

## 2018-07-23 DIAGNOSIS — E78.2 MIXED HYPERLIPIDEMIA: ICD-10-CM

## 2018-07-23 DIAGNOSIS — J30.1 CHRONIC SEASONAL ALLERGIC RHINITIS DUE TO POLLEN: ICD-10-CM

## 2018-07-23 DIAGNOSIS — Z01.818 PREOP GENERAL PHYSICAL EXAM: Primary | ICD-10-CM

## 2018-07-23 PROCEDURE — 99244 OFF/OP CNSLTJ NEW/EST MOD 40: CPT | Performed by: FAMILY MEDICINE

## 2018-07-23 NOTE — PROGRESS NOTES
Subjective: Philip Gonzáles is a 62 y.o.  female who presents to the office today for a preoperative consultation at the request of surgeon Simona Adrian who plans on performing Left Knee Torn Meniscus repair on July 26. She has chronic pain in the knee that isnot responding to treatment This consultation is requested for the specific conditions prompting preoperative evaluation (i.e. because of potential affect on operative risk): medical problems. Planned anesthesia is General.  The patient has the following known anesthesia issues:   Patient has a bleeding risk of : no recent abnormal bleeding, no remote history of abnormal bleeding, no use of Ca-channel blockers  Patient does not have objection to receiving blood products if needed.   Past Medical History:   Diagnosis Date    Chicken pox     Dyspareunia     HPV (human papilloma virus) anogenital infection     Recurrent cold sores     STD (sexually transmitted disease)     HPV     Patient Active Problem List    Diagnosis Date Noted    Mixed hyperlipidemia 04/11/2017    Chronic seasonal allergic rhinitis due to pollen 04/11/2017    Chondromalacia patellae, left knee 04/18/2016    Baker cyst 09/23/2015    Acne vulgaris 08/20/2013    Atrophic vaginitis 02/19/2013    Routine general medical examination at a health care facility 02/07/2012    Vitamin D deficiency 02/07/2012    Recurrent cold sores 02/07/2012     Past Surgical History:   Procedure Laterality Date    COLPOSCOPY      LEEP      SKIN TAG REMOVAL      TOOTH EXTRACTION       Family History   Problem Relation Age of Onset    High Blood Pressure Mother     High Cholesterol Mother     Depression Mother     Arthritis Father     Heart Disease Father     High Blood Pressure Father     High Cholesterol Father     Kidney Disease Father     Cancer Father         testicle    Heart Disease Maternal Grandfather     Heart Disease Paternal Grandfather     Heart Disease Brother     High Cholesterol Brother     High Cholesterol Sister     Rheum Arthritis Neg Hx     Osteoarthritis Neg Hx     Asthma Neg Hx     Breast Cancer Neg Hx     Diabetes Neg Hx     Heart Failure Neg Hx     Hypertension Neg Hx     Migraines Neg Hx     Ovarian Cancer Neg Hx     Rashes/Skin Problems Neg Hx     Seizures Neg Hx     Stroke Neg Hx     Thyroid Disease Neg Hx      Social History     Social History    Marital status:      Spouse name: N/A    Number of children: N/A    Years of education: N/A     Social History Main Topics    Smoking status: Never Smoker    Smokeless tobacco: Never Used    Alcohol use 4.8 oz/week     4 Cans of beer, 4 Standard drinks or equivalent per week    Drug use: No    Sexual activity: Yes     Partners: Male     Other Topics Concern    None     Social History Narrative    None     Current Outpatient Prescriptions   Medication Sig Dispense Refill    zoster recombinant adjuvanted vaccine (SHINGRIX) 50 MCG SUSR injection Give vaccine as directed 1 each 0    rosuvastatin (CRESTOR) 20 MG tablet Take 1 tablet by mouth daily 90 tablet 3    valACYclovir (VALTREX) 1 g tablet TAKE 2 TABLETS BY MOUTH TWO TIMES A DAY FOR 2 DOSES (Patient taking differently: TAKE 2 TABLETS BY MOUTH TWO TIMES A DAY FOR 2 DOSES, AS NEEDED) 36 tablet 1    Probiotic Product (PROBIOTIC-10 ULTIMATE PO) Take by mouth      Vitamin D (CHOLECALCIFEROL) 1000 UNITS CAPS capsule Take 1,000 Units by mouth daily. No current facility-administered medications for this visit.       Allergies   Allergen Reactions    Penicillins Hives    Sulfa Antibiotics Hives     Review of Systems  Constitutional: negative  Eyes: negative  Ears, nose, mouth, throat, and face: negative  Respiratory: negative  Cardiovascular: negative  Gastrointestinal: negative  Genitourinary:negative  Integument/breast: negative  Hematologic/lymphatic: negative  Musculoskeletal:negative  Neurological: before surgery, discontinue NSAIDs (.) 14d before surgery  3. Prophylaxis for cardiac events with perioperative beta-blockers: not indicated  4. Invasive hemodynamic monitoring perioperatively: at the discretion of anesthesiologist  5. Deep vein thrombosis prophylaxis postoperatively:regimen to be chosen by surgical team       Diagnosis Orders   1. Preop general physical exam  Okay for surgery     2. Acute lateral meniscal tear, left, initial encounter  The condition has deteriorated. It has failed conservative management and needs definitive surgical repair. I recommended that the patient wash entire body with Hibiclens soap daily starting three days before surgery. 3. Mixed hyperlipidemia  Condition stable continue the medications, treatments, will check labs as appropriate       The patient received counseling on the following healthy behaviors: nutrition, exercise, medication adherence and decrease in alcohol consumption    Patient given educational materials on Hyperlipidemia and Nutrition if appropriate    I have instructed the patient to complete a self tracking handout on diet and instructed them to bring it with them to the  next appointment. Discussed use, benefit, and side effects of prescribed medications. Barriers to medication compliance addressed. All patient questions answered. Pt voiced understanding. 4. Chronic seasonal allergic rhinitis due to pollen Condition stable continue the medications, treatments, will check labs as appropriate     5.  Chondromalacia patellae, left knee Condition stable continue the medications, treatments, will check labs as appropriate

## 2018-07-26 ENCOUNTER — HOSPITAL ENCOUNTER (OUTPATIENT)
Dept: SURGERY | Age: 58
Discharge: HOME | End: 2018-07-26
Attending: ORTHOPAEDIC SURGERY | Admitting: ORTHOPAEDIC SURGERY

## 2018-07-26 VITALS
SYSTOLIC BLOOD PRESSURE: 114 MMHG | HEIGHT: 70 IN | BODY MASS INDEX: 24.2 KG/M2 | DIASTOLIC BLOOD PRESSURE: 79 MMHG | HEART RATE: 56 BPM | RESPIRATION RATE: 16 BRPM | TEMPERATURE: 98.2 F | WEIGHT: 169 LBS | OXYGEN SATURATION: 96 %

## 2018-07-26 DIAGNOSIS — S83.242D ACUTE MEDIAL MENISCUS TEAR OF LEFT KNEE, SUBSEQUENT ENCOUNTER: Primary | ICD-10-CM

## 2018-07-26 RX ORDER — SODIUM CHLORIDE 0.9 % (FLUSH) 0.9 %
10 SYRINGE (ML) INJECTION PRN
Status: DISCONTINUED | OUTPATIENT
Start: 2018-07-26 | End: 2018-07-27 | Stop reason: HOSPADM

## 2018-07-26 RX ORDER — MEPERIDINE HYDROCHLORIDE 25 MG/ML
12.5 INJECTION INTRAMUSCULAR; INTRAVENOUS; SUBCUTANEOUS EVERY 5 MIN PRN
Status: DISCONTINUED | OUTPATIENT
Start: 2018-07-26 | End: 2018-07-27 | Stop reason: HOSPADM

## 2018-07-26 RX ORDER — SODIUM CHLORIDE, SODIUM LACTATE, POTASSIUM CHLORIDE, CALCIUM CHLORIDE 600; 310; 30; 20 MG/100ML; MG/100ML; MG/100ML; MG/100ML
INJECTION, SOLUTION INTRAVENOUS CONTINUOUS
Status: DISCONTINUED | OUTPATIENT
Start: 2018-07-26 | End: 2018-07-27 | Stop reason: HOSPADM

## 2018-07-26 RX ORDER — HYDROMORPHONE HCL 110MG/55ML
0.5 PATIENT CONTROLLED ANALGESIA SYRINGE INTRAVENOUS EVERY 5 MIN PRN
Status: DISCONTINUED | OUTPATIENT
Start: 2018-07-26 | End: 2018-07-27 | Stop reason: HOSPADM

## 2018-07-26 RX ORDER — IBUPROFEN 800 MG/1
800 TABLET ORAL EVERY 6 HOURS PRN
Qty: 60 TABLET | Refills: 3 | Status: SHIPPED | OUTPATIENT
Start: 2018-07-26 | End: 2019-03-12

## 2018-07-26 RX ORDER — ONDANSETRON 2 MG/ML
4 INJECTION INTRAMUSCULAR; INTRAVENOUS
Status: ACTIVE | OUTPATIENT
Start: 2018-07-26 | End: 2018-07-26

## 2018-07-26 RX ORDER — LABETALOL HYDROCHLORIDE 5 MG/ML
5 INJECTION, SOLUTION INTRAVENOUS EVERY 10 MIN PRN
Status: DISCONTINUED | OUTPATIENT
Start: 2018-07-26 | End: 2018-07-27 | Stop reason: HOSPADM

## 2018-07-26 RX ORDER — OXYCODONE HYDROCHLORIDE 5 MG/1
5 TABLET ORAL EVERY 4 HOURS PRN
Qty: 15 TABLET | Refills: 0 | Status: SHIPPED | OUTPATIENT
Start: 2018-07-26 | End: 2018-08-02

## 2018-07-26 RX ORDER — CEFAZOLIN SODIUM 2 G/100ML
2 INJECTION, SOLUTION INTRAVENOUS
Status: COMPLETED | OUTPATIENT
Start: 2018-07-26 | End: 2018-07-26

## 2018-07-26 RX ORDER — LIDOCAINE HYDROCHLORIDE 10 MG/ML
1 INJECTION, SOLUTION EPIDURAL; INFILTRATION; INTRACAUDAL; PERINEURAL
Status: ACTIVE | OUTPATIENT
Start: 2018-07-26 | End: 2018-07-26

## 2018-07-26 RX ORDER — LIDOCAINE HYDROCHLORIDE 10 MG/ML
0.5 INJECTION, SOLUTION EPIDURAL; INFILTRATION; INTRACAUDAL; PERINEURAL ONCE
Status: DISCONTINUED | OUTPATIENT
Start: 2018-07-26 | End: 2018-07-27 | Stop reason: HOSPADM

## 2018-07-26 RX ORDER — HYDRALAZINE HYDROCHLORIDE 20 MG/ML
5 INJECTION INTRAMUSCULAR; INTRAVENOUS EVERY 10 MIN PRN
Status: DISCONTINUED | OUTPATIENT
Start: 2018-07-26 | End: 2018-07-27 | Stop reason: HOSPADM

## 2018-07-26 RX ORDER — ACETAMINOPHEN 500 MG
1000 TABLET ORAL EVERY 6 HOURS PRN
Qty: 60 TABLET | Refills: 3 | Status: SHIPPED | OUTPATIENT
Start: 2018-07-26 | End: 2019-03-12

## 2018-07-26 RX ORDER — OXYCODONE HYDROCHLORIDE AND ACETAMINOPHEN 5; 325 MG/1; MG/1
1 TABLET ORAL
Status: COMPLETED | OUTPATIENT
Start: 2018-07-26 | End: 2018-07-26

## 2018-07-26 RX ORDER — SODIUM CHLORIDE 0.9 % (FLUSH) 0.9 %
10 SYRINGE (ML) INJECTION EVERY 12 HOURS SCHEDULED
Status: DISCONTINUED | OUTPATIENT
Start: 2018-07-26 | End: 2018-07-27 | Stop reason: HOSPADM

## 2018-07-26 RX ADMIN — OXYCODONE HYDROCHLORIDE AND ACETAMINOPHEN 1 TABLET: 5; 325 TABLET ORAL at 08:14

## 2018-07-26 RX ADMIN — SODIUM CHLORIDE, SODIUM LACTATE, POTASSIUM CHLORIDE, CALCIUM CHLORIDE: 600; 310; 30; 20 INJECTION, SOLUTION INTRAVENOUS at 06:30

## 2018-07-26 RX ADMIN — CEFAZOLIN SODIUM 2 G: 2 INJECTION, SOLUTION INTRAVENOUS at 06:55

## 2018-07-26 ASSESSMENT — PAIN SCALES - GENERAL
PAINLEVEL_OUTOF10: 4
PAINLEVEL_OUTOF10: 0

## 2018-07-26 ASSESSMENT — PAIN DESCRIPTION - LOCATION: LOCATION: KNEE

## 2018-07-26 ASSESSMENT — PAIN DESCRIPTION - PAIN TYPE: TYPE: SURGICAL PAIN

## 2018-07-26 ASSESSMENT — PAIN DESCRIPTION - ORIENTATION: ORIENTATION: LEFT

## 2018-07-26 NOTE — OP NOTE
was started per the Anesthesia  service. She was positioned supine on the operating room table with all  bony prominences padded. The left knee at that time was injected under  sterile conditions through a lateral approach with a total of 60 mL of  local anesthetic, which was a 1:1 mixture of 0.5% Marcaine with epinephrine  and 1% lidocaine. The left knee was then prepped and draped in a standard  sterile fashion. A standard anterior, inferior, and lateral portal was established at that  time by incising the skin with an 11 blade. Blunt trocar was then used to  insert the cannula into the knee joint. Spinal needle localization was  used to establish an anterior, inferior, and medial portal.  An 11 blade  was used to incise the skin anterior, inferior, and medially. Nerve hook  was inserted into the joint and diagnostic arthroscopy was performed with  following findings:  1. In the patellofemoral compartment, there were some areas of grade 1  chondromalacia on the undersurface of the patella. 2.  In the medial and lateral gutters, there was no evidence of loose  bodies. 3.  In the medial compartment, there was a large radial flap tear of the  posterior horn of the medial meniscus with a flap displaced towards the  trochlear notch. The medial articular surfaces and the extension surface  looked good. On the weightbearing surface, there was an area of grade 3  chondromalacia with some unstable chondral flaps on the flexion  weightbearing surface of the medial femoral condyle. 4.  In the trochlear notch, the ACL and PCL were found to be intact. 5. In the lateral compartment, the lateral meniscus and articular surfaces  were found to be intact. There was a loose body, which was roughly a  centimeter in size that was within the lateral compartment. Medial compartment at that time was reentered.   A combination of a shaver  and a biter were used to debride the medial meniscus until it was down to

## 2018-07-26 NOTE — PROGRESS NOTES
Patient's awake and alert. Oxygen saturation 100% on room air. NSR on the monitor. VSS. drsg to left leg CDI. Skin warm, brisk cap refill, pedal pulse palpable. Full sensation, can wiggle toes. Rating pain 3/10, tolerable for patient. Tolerating PO, denies nausea. Patient meets criteria to be discharged from phase 1.  Will prepare for discharge home in phase 2    Electronically signed by Leida Kemp RN on 7/26/2018 at 0800

## 2018-07-26 NOTE — PROGRESS NOTES
Patient arrived from OR to PACU. Oral airway in place. Oxygen saturation 97% on 4L n/c. SB on the monitor. VSS. drsg to left knee CDI. Skin warm, brisk cap refill, pedal pulse palpated. Elevated and ice applied.  Will continue to monitor    Electronically signed by Robyn Schmitz RN on 7/26/2018 at 7585

## 2018-07-26 NOTE — PROGRESS NOTES
Oral airway removed at this time    Electronically signed by Taran Arango RN on 7/26/2018 at 7:50 AM

## 2018-07-26 NOTE — PROGRESS NOTES
Discharge instructions went over with patient and patient's . Patient has crutches. Patient and patient's  aware not to take over 4000mg of tylenol in 24 hours.     Electronically signed by Taran Arango RN on 7/26/2018 at 8:36 AM

## 2018-07-30 DIAGNOSIS — S83.242A TEAR OF MEDIAL MENISCUS OF LEFT KNEE, UNSPECIFIED TEAR TYPE, UNSPECIFIED WHETHER OLD OR CURRENT TEAR, INITIAL ENCOUNTER: Primary | ICD-10-CM

## 2018-07-30 DIAGNOSIS — M94.262 CHONDROMALACIA OF LEFT KNEE: ICD-10-CM

## 2018-07-30 DIAGNOSIS — M23.42 LOOSE BODY OF LEFT KNEE: ICD-10-CM

## 2018-07-31 ENCOUNTER — OFFICE VISIT (OUTPATIENT)
Dept: ORTHOPEDIC SURGERY | Age: 58
End: 2018-07-31

## 2018-07-31 VITALS — BODY MASS INDEX: 25.04 KG/M2 | HEIGHT: 69 IN | WEIGHT: 169.09 LBS

## 2018-07-31 DIAGNOSIS — S83.232D COMPLEX TEAR OF MEDIAL MENISCUS OF LEFT KNEE AS CURRENT INJURY, SUBSEQUENT ENCOUNTER: Primary | ICD-10-CM

## 2018-07-31 PROCEDURE — 99024 POSTOP FOLLOW-UP VISIT: CPT | Performed by: ORTHOPAEDIC SURGERY

## 2018-07-31 NOTE — PROGRESS NOTES
Chief Complaint    Post-Op Check (left knee)      History of Present Illness:  Bill Blake is a 62 y.o. female. Follow for the left knee. Status post left knee arthroscopy with partial medial meniscectomy and loose body removal.  She is doing well at this time. Swelling has improved a lot within the knee. She is angulating without any assistive ambulatory devices. Having very minimal pain within the knee. Medical History:  Patient's medications, allergies, past medical, surgical, social and family histories were reviewed and updated as appropriate. Review of Systems:  Pertinent items are noted in HPI  Review of systems reviewed from Patient History Form dated on 7/31/18 and available in the patient's chart under the Media tab. Vital Signs:  Ht 5' 9.49\" (1.765 m)   Wt 169 lb 1.5 oz (76.7 kg)   LMP 07/28/2012   BMI 24.62 kg/m²     General Exam:   Constitutional: Patient is adequately groomed with no evidence of malnutrition  DTRs: Deep tendon reflexes are intact  Mental Status: The patient is oriented to time, place and person. The patient's mood and affect are appropriate. Knee Examination:    Inspection:  Surgical incisions well-healed. No erythema or drainage    Palpation:  Mild palpable effusion within the knee today    Range of Motion:  0-100°    Strength:  Able to do a straight leg raise    Special Tests:  Negative Homans sign    Skin: There are no rashes, ulcerations or lesions. Additional Comments:       Additional Examinations:         Right Lower Extremity: Examination of the right lower extremity does not show any tenderness, deformity or injury. Range of motion is unremarkable. There is no gross instability. There are no rashes, ulcerations or lesions. Strength and tone are normal.      Assessment :  Status post left knee arthroscopy with partial medial meniscectomy and loose body removal    Impression:  Encounter Diagnosis   Name Primary?     Complex tear of medial

## 2018-08-02 ENCOUNTER — HOSPITAL ENCOUNTER (OUTPATIENT)
Dept: PHYSICAL THERAPY | Age: 58
Setting detail: THERAPIES SERIES
Discharge: HOME | End: 2018-08-02
Payer: COMMERCIAL

## 2018-08-02 PROCEDURE — G8979 MOBILITY GOAL STATUS: HCPCS | Performed by: PHYSICAL THERAPIST

## 2018-08-02 PROCEDURE — 97161 PT EVAL LOW COMPLEX 20 MIN: CPT | Performed by: PHYSICAL THERAPIST

## 2018-08-02 PROCEDURE — 97110 THERAPEUTIC EXERCISES: CPT | Performed by: PHYSICAL THERAPIST

## 2018-08-02 PROCEDURE — G0283 ELEC STIM OTHER THAN WOUND: HCPCS | Performed by: PHYSICAL THERAPIST

## 2018-08-02 PROCEDURE — G8978 MOBILITY CURRENT STATUS: HCPCS | Performed by: PHYSICAL THERAPIST

## 2018-08-02 NOTE — PLAN OF CARE
The [facility] and [facility]  [address], [place], [address] [facility]  Phone: [phone]   Fax:     [phone]                                                       Physical Therapy Certification    Dear Referring Practitioner: Dr Stefani Ramos,    We had the pleasure of evaluating the following patient for physical therapy services at [address] [place] [address]. A summary of our findings can be found in the initial assessment below. This includes our plan of care. If you have any questions or concerns regarding these findings, please do not hesitate to contact me at the office phone number checked above. Thank you for the referral.       Physician Signature:_______________________________Date:__________________  By signing above (or electronic signature), therapists plan is approved by physician      Patient: Kelsie Knowles   : 1960   MRN: [id number]  Referring Physician: Referring Practitioner: Dr Stefani Ramos      Evaluation Date: 2018      Medical Diagnosis Information:  Diagnosis: Left knee PMM, loose body removal, chondroplasty   S83.242A   Treatment Diagnosis: PT practice pattern: 4I,  left knee pain                                         Insurance information: PT Insurance Information: Medical [place]    visits based on medical necessity,  $50/25 copay     Precautions/ Contra-indications:   Latex Allergy:  [x]NO      []YES  Preferred Language for Healthcare:   [x]English       []other:    SUBJECTIVE: Patient stated complaint:  Left knee scope with PMM, loose body removal on 18. No known JAIRO. Stitches removed 2 days ago. Some soreness with standing, walking, stairs. Getting up from seated position trying to use the RLE to do most of the work. MD follow up on 18.     Relevant Medical History:none  Functional Disability Index:LEFS:  46%    Pain Scale: tendinitis/tendinosis    []signs/symptoms consistent with pathology which may benefit from Dry needling      []other:      Prognosis/Rehab Potential:      []Excellent   [x]Good    []Fair   []Poor    Tolerance of evaluation/treatment:    []Excellent   [x]Good    []Fair   []Poor    Physical Therapy Evaluation Complexity Justification  [x] A history of present problem with:  [x] no personal factors and/or comorbidities that impact the plan of care;  []1-2 personal factors and/or comorbidities that impact the plan of care  []3 personal factors and/or comorbidities that impact the plan of care  [x] An examination of body systems using standardized tests and measures addressing any of the following: body structures and functions (impairments), activity limitations, and/or participation restrictions;:  [] a total of 1-2 or more elements   [] a total of 3 or more elements   [x] a total of 4 or more elements   [x] A clinical presentation with:  [x] stable and/or uncomplicated characteristics   [] evolving clinical presentation with changing characteristics  [] unstable and unpredictable characteristics;   [x] Clinical decision making of [x] low, [] moderate, [] high complexity using standardized patient assessment instrument and/or measurable assessment of functional outcome. [x] EVAL (LOW) 47718 (typically 20 minutes face-to-face)  [] EVAL (MOD) 25250 (typically 30 minutes face-to-face)  [] EVAL (HIGH) 08368 (typically 45 minutes face-to-face)  [] RE-EVAL     PLAN  Frequency/Duration:  2 days per week for 5 Weeks:  Interventions:  1. Therapeutic exercise including: strength training, ROM/flexibility, NMR and proprioception for the proximal hip, core and Lower extremity  2. Manual therapy as indicated including Dry Needling/IASTM, STM, PROM, Gr I-IV mobilizations, spinal mobilization/manipulation. 3. Modalities as needed including: thermal agents, E-stim, US, iontophoresis as indicated.    4. Patient education on joint

## 2018-08-02 NOTE — FLOWSHEET NOTE
increasing ROM, reducing/eliminating soft tissue swelling/inflammation/restriction, improving soft tissue extensibility and allowing for proper ROM for normal function with self care, mobility, lifting and ambulation. Modalities:  CP/HV 15'    Charges:  Timed Code Treatment Minutes: 20   Total Treatment Minutes: 50     [x] EVAL (LOW) 11526 (typically 20 minutes face-to-face)  [] EVAL (MOD) 87598 (typically 30 minutes face-to-face)  [] EVAL (HIGH) 33567 (typically 45 minutes face-to-face)  [] RE-EVAL     [x] QQ(53972) x  1   [] IONTO   [] NMR (42154) x      [] VASO  [] Manual (41638) x       [] Other:  [] TA x       [] Mech Traction (66273)  [] ES(attended) (33760)      [x] ES (un) (92259):     GOALS:   Short Term Goals: To be achieved in: 2 weeks  1. Independent in HEP and progression per patient tolerance, in order to prevent re-injury. 2. Patient will have a decrease in pain to facilitate improvement in movement, function, and ADLs as indicated by Functional Deficits. Long Term Goals: To be achieved in: 5 weeks  1. Disability index score of 20% or less for the LEFS to assist with reaching prior level of function. 2. Patient will demonstrate increased AROM to 0-135 to allow for proper joint functioning as indicated by patients Functional Deficits. 3. Patient will demonstrate an increase in Strength to good quad tone, 5/5 knee ext/hip abd to allow for proper functional mobility as indicated by patients Functional Deficits. 4. Patient will return to walking up/down one flight of stairs without increased symptoms or restriction. Progression Towards Functional goals:  [] Patient is progressing as expected towards functional goals listed. [] Progression is slowed due to complexities listed. [] Progression has been slowed due to co-morbidities.   [x] Plan just implemented, too soon to assess goals progression  [] Other:     ASSESSMENT:  See eval    Treatment/Activity Tolerance:  [x] Patient tolerated treatment well [] Patient limited by fatique  [] Patient limited by pain  [] Patient limited by other medical complications  [] Other:     Prognosis: [x] Good [] Fair  [] Poor    Patient Requires Follow-up: [x] Yes  [] No    PLAN FOR NEXT SESSION:     PLAN: See eval  [] Continue per plan of care [] Alter current plan (see comments)  [x] Plan of care initiated [] Hold pending MD visit [] Discharge    Electronically signed by: Cassie Hansen PT [id number]

## 2018-08-06 ENCOUNTER — HOSPITAL ENCOUNTER (OUTPATIENT)
Dept: PHYSICAL THERAPY | Age: 58
Setting detail: THERAPIES SERIES
Discharge: HOME | End: 2018-08-06
Payer: COMMERCIAL

## 2018-08-06 PROCEDURE — 97112 NEUROMUSCULAR REEDUCATION: CPT | Performed by: SPECIALIST/TECHNOLOGIST

## 2018-08-06 PROCEDURE — 97110 THERAPEUTIC EXERCISES: CPT | Performed by: SPECIALIST/TECHNOLOGIST

## 2018-08-06 PROCEDURE — G0283 ELEC STIM OTHER THAN WOUND: HCPCS | Performed by: SPECIALIST/TECHNOLOGIST

## 2018-08-06 NOTE — FLOWSHEET NOTE
The [address] [facility] and [facility]    Physical Therapy Daily Treatment Note  Date:  2018    Patient Name:  Philip Gonzáles    :  1960  MRN: [id number]  Restrictions/Precautions:    Medical/Treatment Diagnosis Information:  · Diagnosis: Left knee PMM, loose body removal, chondroplasty   S83.242A  · Treatment Diagnosis: PT practice pattern: 4I,  left knee pain  Insurance/Certification information:  PT Insurance Information: Medical [place]    visits based on medical necessity,  $50/25 copay  Physician Information:  Referring Practitioner: Dr Cristobal Sigala of care signed (Y/N):     Date of Patient follow up with Physician: 18    G-Code (if applicable):  CK    Date G-Code Applied:  18  PT G-Codes  Functional Assessment Tool Used: LEFS  Score: 46%  Functional Limitation: Mobility: Walking and moving around  Mobility: Walking and Moving Around Current Status (): At least 40 percent but less than 60 percent impaired, limited or restricted  Mobility: Walking and Moving Around Goal Status (): At least 1 percent but less than 20 percent impaired, limited or restricted    Progress Note: [x]  Yes  []  No  Next due by: Visit #10       Latex Allergy:  [x]NO      []YES  Preferred Language for Healthcare:   [x]English       []other:    Visit # Insurance Allowable   2 BMN     Pain level:  2/10     SUBJECTIVE:  Pt. reports her knee feels good since her initial evaluation. OBJECTIVE:   Observation:   Test measurements:      RESTRICTIONS/PRECAUTIONS: none    Exercises/Interventions:       DOS: 18  Script: 18 MD 18  Exercise Sets/Reps Notes Last Progression   Gastroc Stretch 5x30'' Slant - changed     Heelslides       LLLD Wallslide      HS Stretch:  Tableside  5x30''     SAQ 30 x 3\" New     LAQ      QS 30x5'' + towel roll     SLR Flex 3x10 TC    SLR Abd 3x10      SLR Ext      SLR Add.       Clamshells X 20 B New     Bridges 20 x  New     HR  x 30 New Ankle Theraband      BAPS      Bike 5'     Elliptical      Standing Stretch:  (insert muscles)      Weight Shifting      Lateral Walk      Squats      Single Leg Stance Balance      [facility] TKE 60# 30 x 3\" New                     Therapeutic Exercise and NMR EXR  [x] (92785) Provided verbal/tactile cueing for activities related to strengthening, flexibility, endurance, ROM for improvements in LE, proximal hip, and core control with self care, mobility, lifting, ambulation. [x] (88061) Provided verbal/tactile cueing for activities related to improving balance, coordination, kinesthetic sense, posture, motor skill, proprioception  to assist with LE, proximal hip, and core control in self care, mobility, lifting, ambulation and eccentric single leg control.      NMR and Therapeutic Activities:    [] (78712 or 48367) Provided verbal/tactile cueing for activities related to improving balance, coordination, kinesthetic sense, posture, motor skill, proprioception and motor activation to allow for proper function of core, proximal hip and LE with self care and ADLs  [] (07186) Gait Re-education- Provided training and instruction to the patient for proper LE, core and proximal hip recruitment and positioning and eccentric body weight control with ambulation re-education including up and down stairs     Home Exercise Program:    [x] (06124) Reviewed/Progressed HEP activities related to strengthening, flexibility, endurance, ROM of core, proximal hip and LE for functional self-care, mobility, lifting and ambulation/stair navigation   [] (76058)Reviewed/Progressed HEP activities related to improving balance, coordination, kinesthetic sense, posture, motor skill, proprioception of core, proximal hip and LE for self care, mobility, lifting, and ambulation/stair navigation      Manual Treatments:  PROM / Scar Mobs / Princella Armas / Knee (Flex./Ext.)  Stretch: H.S. / [place] / Piriformis / Vaibhav [place] / Groin / Hip Flexor   [] (74604) Provided manual therapy to mobilize LE, proximal hip and/or LS spine soft tissue/joints for the purpose of modulating pain, promoting relaxation,  increasing ROM, reducing/eliminating soft tissue swelling/inflammation/restriction, improving soft tissue extensibility and allowing for proper ROM for normal function with self care, mobility, lifting and ambulation. Modalities:  CP/HV 15'    Charges:  Timed Code Treatment Minutes: 40   Total Treatment Minutes: 50     [] EVAL (LOW) 50158 (typically 20 minutes face-to-face)  [] EVAL (MOD) 98480 (typically 30 minutes face-to-face)  [] EVAL (HIGH) 37268 (typically 45 minutes face-to-face)  [] RE-EVAL     [x] DM(96068) x  2   [] IONTO   [x] NMR (00438) x  1   [] VASO  [] Manual (96755) x       [] Other:  [] TA x       [] Mech Traction (65012)  [] ES(attended) (01016)      [x] ES (un) (38157):     GOALS:   Short Term Goals: To be achieved in: 2 weeks  1. Independent in HEP and progression per patient tolerance, in order to prevent re-injury. 2. Patient will have a decrease in pain to facilitate improvement in movement, function, and ADLs as indicated by Functional Deficits. Long Term Goals: To be achieved in: 5 weeks  1. Disability index score of 20% or less for the LEFS to assist with reaching prior level of function. 2. Patient will demonstrate increased AROM to 0-135 to allow for proper joint functioning as indicated by patients Functional Deficits. 3. Patient will demonstrate an increase in Strength to good quad tone, 5/5 knee ext/hip abd to allow for proper functional mobility as indicated by patients Functional Deficits. 4. Patient will return to walking up/down one flight of stairs without increased symptoms or restriction. Progression Towards Functional goals:  [] Patient is progressing as expected towards functional goals listed. [] Progression is slowed due to complexities listed. [] Progression has been slowed due to co-morbidities.   [x] Plan

## 2018-08-06 NOTE — PROGRESS NOTES
I have reviewed and agree to the content of the note created by the Physical Therapist Assistant.     Electronically signed by Tanya Day PT

## 2018-08-09 ENCOUNTER — HOSPITAL ENCOUNTER (OUTPATIENT)
Dept: PHYSICAL THERAPY | Age: 58
Setting detail: THERAPIES SERIES
Discharge: HOME | End: 2018-08-09
Payer: COMMERCIAL

## 2018-08-09 PROCEDURE — 97112 NEUROMUSCULAR REEDUCATION: CPT | Performed by: SPECIALIST/TECHNOLOGIST

## 2018-08-09 PROCEDURE — G0283 ELEC STIM OTHER THAN WOUND: HCPCS | Performed by: SPECIALIST/TECHNOLOGIST

## 2018-08-09 PROCEDURE — 97110 THERAPEUTIC EXERCISES: CPT | Performed by: SPECIALIST/TECHNOLOGIST

## 2018-08-09 NOTE — FLOWSHEET NOTE
[address] [facility]    Physical Therapy Daily Treatment Note  Date:  2018    Patient Name:  Kelsie Knowles    :  1960  MRN: [id number]  Restrictions/Precautions:    Medical/Treatment Diagnosis Information:  · Diagnosis: Left knee PMM, loose body removal, chondroplasty   S83.242A  · Treatment Diagnosis: PT practice pattern: 4I,  left knee pain  Insurance/Certification information:  PT Insurance Information: Medical [place]    visits based on medical necessity,  $50/25 copay  Physician Information:  Referring Practitioner: Dr Gopi Alexander of care signed (Y/N):     Date of Patient follow up with Physician: 18    G-Code (if applicable):  CK    Date G-Code Applied:  18  PT G-Codes  Functional Assessment Tool Used: LEFS  Score: 46%  Functional Limitation: Mobility: Walking and moving around  Mobility: Walking and Moving Around Current Status (): At least 40 percent but less than 60 percent impaired, limited or restricted  Mobility: Walking and Moving Around Goal Status (): At least 1 percent but less than 20 percent impaired, limited or restricted    Progress Note: [x]  Yes  []  No  Next due by: Visit #10       Latex Allergy:  [x]NO      []YES  Preferred Language for Healthcare:   [x]English       []other:    Visit # Insurance Allowable   3 BMN     Pain level:  1/10     SUBJECTIVE:  Pt. reports her knee is getting stronger everyday. OBJECTIVE:   Observation: Pt. Would like to R/T Golf  Test measurements:      RESTRICTIONS/PRECAUTIONS: none    Exercises/Interventions:       DOS: 18  Script: 18 MD 18  Exercise Sets/Reps Notes Last Progression   Gastroc Stretch 5x30'' Slant -     Heelslides       LLLD Wallslide      HS Stretch:  Tableside  5x30''         LAQ          SLR Flex - staggered X 20 TC - changed     SLR Abd 3x10      SLR Ext      SLR Add. Clamshells Or.  X 20 B Added loop    Bridges + SB 20 x  Added SB Ankle Theraband      BAPS      Bike 5'     Elliptical      Standing Stretch:  (insert muscles)            FSU 4' x 15 New     PEP 2' New     Leg press  100# x 30     Lateral Walk Or. Loop 2 laps New     Squats      Single Leg Stance Balance 3 x 30\" New     GABRIELA TKE  abd 60# 30 x 3\"   45# x 20 B   New           ATC area           Therapeutic Exercise and NMR EXR  [x] (94719) Provided verbal/tactile cueing for activities related to strengthening, flexibility, endurance, ROM for improvements in LE, proximal hip, and core control with self care, mobility, lifting, ambulation. [x] (47512) Provided verbal/tactile cueing for activities related to improving balance, coordination, kinesthetic sense, posture, motor skill, proprioception  to assist with LE, proximal hip, and core control in self care, mobility, lifting, ambulation and eccentric single leg control.      NMR and Therapeutic Activities:    [] (08341 or 98666) Provided verbal/tactile cueing for activities related to improving balance, coordination, kinesthetic sense, posture, motor skill, proprioception and motor activation to allow for proper function of core, proximal hip and LE with self care and ADLs  [] (65625) Gait Re-education- Provided training and instruction to the patient for proper LE, core and proximal hip recruitment and positioning and eccentric body weight control with ambulation re-education including up and down stairs     Home Exercise Program:    [x] (15220) Reviewed/Progressed HEP activities related to strengthening, flexibility, endurance, ROM of core, proximal hip and LE for functional self-care, mobility, lifting and ambulation/stair navigation   [] (58012)Reviewed/Progressed HEP activities related to improving balance, coordination, kinesthetic sense, posture, motor skill, proprioception of core, proximal hip and LE for self care, mobility, lifting, and ambulation/stair navigation      Manual Treatments:  PROM / Mery Estevez /

## 2018-08-13 ENCOUNTER — HOSPITAL ENCOUNTER (OUTPATIENT)
Dept: PHYSICAL THERAPY | Age: 58
Setting detail: THERAPIES SERIES
Discharge: HOME | End: 2018-08-13
Payer: COMMERCIAL

## 2018-08-13 PROCEDURE — 97110 THERAPEUTIC EXERCISES: CPT | Performed by: SPECIALIST/TECHNOLOGIST

## 2018-08-13 PROCEDURE — 97112 NEUROMUSCULAR REEDUCATION: CPT | Performed by: SPECIALIST/TECHNOLOGIST

## 2018-08-13 PROCEDURE — G0283 ELEC STIM OTHER THAN WOUND: HCPCS | Performed by: SPECIALIST/TECHNOLOGIST

## 2018-08-13 NOTE — PROGRESS NOTES
I have reviewed and agree to the content of the note created by the Physical Therapist Assistant.     Electronically signed by Adrian Hurst PT

## 2018-08-13 NOTE — FLOWSHEET NOTE
Treatments:  PROM / Scar Mobs / STM/Rollerstick / Knee (Flex./Ext.)  Stretch: H.S. / ITB / Piriformis / Quad / Groin / Hip Flexor   [] (06473) Provided manual therapy to mobilize LE, proximal hip and/or LS spine soft tissue/joints for the purpose of modulating pain, promoting relaxation,  increasing ROM, reducing/eliminating soft tissue swelling/inflammation/restriction, improving soft tissue extensibility and allowing for proper ROM for normal function with self care, mobility, lifting and ambulation. Modalities:  CP/HV 15'    Charges:  Timed Code Treatment Minutes: 40   Total Treatment Minutes: 55      [] EVAL (LOW) 05360 (typically 20 minutes face-to-face)  [] EVAL (MOD) 33621 (typically 30 minutes face-to-face)  [] EVAL (HIGH) 54780 (typically 45 minutes face-to-face)  [] RE-EVAL     [x] SI(72131) x  2   [] IONTO   [x] NMR (55972) x  1   [] VASO  [] Manual (56899) x       [] Other:  [] TA x       [] Mech Traction (77277)  [] ES(attended) (05931)      [x] ES (un) (08720):     GOALS:   Short Term Goals: To be achieved in: 2 weeks  1. Independent in HEP and progression per patient tolerance, in order to prevent re-injury. 2. Patient will have a decrease in pain to facilitate improvement in movement, function, and ADLs as indicated by Functional Deficits. Long Term Goals: To be achieved in: 5 weeks  1. Disability index score of 20% or less for the LEFS to assist with reaching prior level of function. 2. Patient will demonstrate increased AROM to 0-135 to allow for proper joint functioning as indicated by patients Functional Deficits. 3. Patient will demonstrate an increase in Strength to good quad tone, 5/5 knee ext/hip abd to allow for proper functional mobility as indicated by patients Functional Deficits. 4. Patient will return to walking up/down one flight of stairs without increased symptoms or restriction.      Progression Towards Functional goals:  [] Patient is progressing as expected towards

## 2018-08-16 ENCOUNTER — HOSPITAL ENCOUNTER (OUTPATIENT)
Dept: PHYSICAL THERAPY | Age: 58
Setting detail: THERAPIES SERIES
Discharge: HOME | End: 2018-08-16
Payer: COMMERCIAL

## 2018-08-16 PROCEDURE — G0283 ELEC STIM OTHER THAN WOUND: HCPCS | Performed by: SPECIALIST/TECHNOLOGIST

## 2018-08-16 PROCEDURE — 97110 THERAPEUTIC EXERCISES: CPT | Performed by: SPECIALIST/TECHNOLOGIST

## 2018-08-16 NOTE — FLOWSHEET NOTE
Jump/Hop  Low                       Med.                       High            Modality                         HV/CP 15'   ATC commun.     Initials DBW

## 2018-08-16 NOTE — FLOWSHEET NOTE
[address] [facility]    Physical Therapy Daily Treatment Note  Date:  2018    Patient Name:  Jameson Archuleta    :  1960  MRN: [id number]  Restrictions/Precautions:    Medical/Treatment Diagnosis Information:  · Diagnosis: Left knee PMM, loose body removal, chondroplasty   S83.242A  · Treatment Diagnosis: PT practice pattern: 4I,  left knee pain  Insurance/Certification information:  PT Insurance Information: Medical [place]    visits based on medical necessity,  $50/25 copay  Physician Information:  Referring Practitioner: Dr Claire Freire of care signed (Y/N):     Date of Patient follow up with Physician: 18    G-Code (if applicable):  CK    Date G-Code Applied:  18  PT G-Codes  Functional Assessment Tool Used: LEFS  Score: 46%  Functional Limitation: Mobility: Walking and moving around  Mobility: Walking and Moving Around Current Status (): At least 40 percent but less than 60 percent impaired, limited or restricted  Mobility: Walking and Moving Around Goal Status (): At least 1 percent but less than 20 percent impaired, limited or restricted    Progress Note: [x]  Yes  []  No  Next due by: Visit #10       Latex Allergy:  [x]NO      []YES  Preferred Language for Healthcare:   [x]English       []other:    Visit # Insurance Allowable   5 BMN     Pain level:  1/10     SUBJECTIVE:  Pt. Reports she fell yesterday but her knee feels okay. OBJECTIVE:   Observation: Pt. Would like to R/T Golf  Test measurements:      RESTRICTIONS/PRECAUTIONS: none    Exercises/Interventions:       DOS: 18  Script: 18 MD 18  Exercise Sets/Reps Notes Last Progression   Gastroc Stretch 5x30'' Slant -     Heelslides       LLLD Wallslide      HS Stretch:  Tableside  5x30''         LAQ          SLR Flex - staggered X 20 TC      SLR Abd 1# 3x10       SLR Ext      SLR Add. Clamshells green.  X 30 B Changed     Bridges + SB 20 x  Arms across chest         Ankle Theraband      BAPS      Bike 5'     Elliptical      Standing Stretch:  (insert muscles)                     Lateral Walk green. Loop 2 laps changed    Squats      Single Leg Stance Balance 3 x 30\" + airex Added airex               ATC area  performed New         Therapeutic Exercise and NMR EXR  [x] (70999) Provided verbal/tactile cueing for activities related to strengthening, flexibility, endurance, ROM for improvements in LE, proximal hip, and core control with self care, mobility, lifting, ambulation. [x] (78555) Provided verbal/tactile cueing for activities related to improving balance, coordination, kinesthetic sense, posture, motor skill, proprioception  to assist with LE, proximal hip, and core control in self care, mobility, lifting, ambulation and eccentric single leg control.      NMR and Therapeutic Activities:    [] (17300 or 88277) Provided verbal/tactile cueing for activities related to improving balance, coordination, kinesthetic sense, posture, motor skill, proprioception and motor activation to allow for proper function of core, proximal hip and LE with self care and ADLs  [] (14308) Gait Re-education- Provided training and instruction to the patient for proper LE, core and proximal hip recruitment and positioning and eccentric body weight control with ambulation re-education including up and down stairs     Home Exercise Program:    [x] (12799) Reviewed/Progressed HEP activities related to strengthening, flexibility, endurance, ROM of core, proximal hip and LE for functional self-care, mobility, lifting and ambulation/stair navigation   [] (03747)Reviewed/Progressed HEP activities related to improving balance, coordination, kinesthetic sense, posture, motor skill, proprioception of core, proximal hip and LE for self care, mobility, lifting, and ambulation/stair navigation      Manual Treatments:  PROM / Scar Mobs / STM/Rollerstick / Knee (Flex./Ext.)  Stretch: H.S. / ITB / Progression has been slowed due to co-morbidities. [x] Plan just implemented, too soon to assess goals progression  [] Other:     ASSESSMENT:  Pt. Was fatigued at the end of tx.     Treatment/Activity Tolerance:  [x] Patient tolerated treatment well [] Patient limited by fatique  [] Patient limited by pain  [] Patient limited by other medical complications  [] Other:     Prognosis: [x] Good [] Fair  [] Poor    Patient Requires Follow-up: [x] Yes  [] No    PLAN FOR NEXT SESSION:     PLAN:   [x] Continue per plan of care [] Alter current plan (see comments)  [] Plan of care initiated [] Hold pending MD visit [] Discharge    Electronically signed by: Tristan Najera, [address], [address] Pereira [address]

## 2018-08-21 ENCOUNTER — HOSPITAL ENCOUNTER (OUTPATIENT)
Dept: PHYSICAL THERAPY | Age: 58
Setting detail: THERAPIES SERIES
Discharge: HOME | End: 2018-08-21
Payer: COMMERCIAL

## 2018-08-21 PROCEDURE — 97110 THERAPEUTIC EXERCISES: CPT | Performed by: PHYSICAL THERAPIST

## 2018-08-21 PROCEDURE — G0283 ELEC STIM OTHER THAN WOUND: HCPCS | Performed by: PHYSICAL THERAPIST

## 2018-08-21 NOTE — FLOWSHEET NOTE
[address] [facility]    [facility] Daily Treatment Note  Date:  2018    Patient Name:  Sailaja Barrios    :  1960  MRN: [id number]  Restrictions/Precautions:    Medical/Treatment Diagnosis Information:  · Diagnosis: Left knee PMM, loose body removal, chondroplasty   S83.242A  · Treatment Diagnosis: PT practice pattern: 4I,  left knee pain  Insurance/Certification information:  PT Insurance Information: Medical [place]    visits based on medical necessity,  $50/25 copay  Physician Information:  Referring Practitioner: Dr Ruy Vance of care signed (Y/N):     Date of Patient follow up with Physician: 18    G-Code (if applicable):  CK    Date G-Code Applied:  18  PT G-Codes  Functional Assessment Tool Used: LEFS  Score: 46%  Functional Limitation: Mobility: Walking and moving around  Mobility: Walking and Moving Around Current Status (): At least 40 percent but less than 60 percent impaired, limited or restricted  Mobility: Walking and Moving Around Goal Status (): At least 1 percent but less than 20 percent impaired, limited or restricted    Progress Note: [x]  Yes  []  No  Next due by: Visit #10       Latex Allergy:  [x]NO      []YES  Preferred Language for Healthcare:   [x]English       []other:    Visit # Insurance Allowable   6 BMN     Pain level:  1/10     SUBJECTIVE:  States the knee is doing well today. Played 8 holes in a golf scramble yesterday and had no issues with the knee. OBJECTIVE:   Observation: Pt. Would like to R/T Golf  Test measurements:      RESTRICTIONS/PRECAUTIONS: none    Exercises/Interventions:       DOS: 18  Script: 18 MD 18  Exercise Sets/Reps Notes Last Progression   Gastroc Stretch 5x30'' Slant -     Heelslides       LLLD Wallslide      HS Stretch:  Tableside  5x30''         LAQ          SLR Flex - staggered X 30     SLR Abd 2# 3x10       SLR Ext      SLR Add.       Clamshells

## 2018-08-21 NOTE — FLOWSHEET NOTE
The [address] [place] [address] and Sports Rehabilitation, [employer]    ATHLETIC TRAINING AREA ACTIVITY SHEET  Date:  2018    Patient Name:  Mavis Homans    :  1960  MRN: [id number]  Restrictions/Precautions:    Medical/Treatment Diagnosis Information:  ·   Diagnosis: Left knee PMM, loose body removal, chondroplasty   S83.242A  · Treatment Diagnosis: PT practice pattern: 4I,  left knee pain     Physician Information:       Weeks Post-op  8 wks  12 wks 16 wks 20 wks   24 wks                            Activity Log                                                  DOS/DOI:                                                    Date: 18   Bike     Elliptical     Treadmill     Airdyne          Gastroc stretch     Soleus stretch     Hamstring stretch     ITB stretch     Hip Flexor stretch     Quad stretch     Adductor stretch          Weight Shifting sp                               fp                               tp     Lateral walking (with/w/o TB)          Balance: PEP/Marj board 2' 2'                  SLS           Star excursion load/explode           Extremity reach UE/LE          Leg Press William. 110# 30X 110# 30x                     Ecc.                       Inv. Calf Press William. SLS VECTOR REACH  10X 3 DIR        [facility]   Flex                ABd 45# 30X EA. 45# 30x ea. ADd                TKE 60# 30X               Ext          Steps  Up 6\" 30X 6\" 30X/20X W/ROT               Up and Over                 Down                 Lateral                 Rotation          Squats:  Mini                    Wall                    BOSU                   STATIC W/ TB TRUNK ROT  DBL GTB 10X EA. Lunges:  Lunge to Balance                    Balance to Lunge                    ANT/ ROT TO R  5X EA        Knee Extension Bilat. Ecc.                                Inv. Hamstring Curls Bilat. Ecc.                                 Inv. Ladders     Square     Jump/Hop  Low                        Med.                        High               Modality                         HV/CP 15' 15'   ATC commun.      PLAN TO DO TPI SCREEN     Initials DBW DBW

## 2018-08-23 ENCOUNTER — HOSPITAL ENCOUNTER (OUTPATIENT)
Dept: PHYSICAL THERAPY | Age: 58
Setting detail: THERAPIES SERIES
Discharge: HOME | End: 2018-08-23
Payer: COMMERCIAL

## 2018-08-23 PROCEDURE — 97110 THERAPEUTIC EXERCISES: CPT | Performed by: SPECIALIST/TECHNOLOGIST

## 2018-08-23 PROCEDURE — G0283 ELEC STIM OTHER THAN WOUND: HCPCS | Performed by: SPECIALIST/TECHNOLOGIST

## 2018-08-23 PROCEDURE — 97112 NEUROMUSCULAR REEDUCATION: CPT | Performed by: SPECIALIST/TECHNOLOGIST

## 2018-08-23 NOTE — FLOWSHEET NOTE
[address] [facility]    [facility] Daily Treatment Note  Date:  2018    Patient Name:  Aishwarya Dai    :  1960  MRN: [id number]  Restrictions/Precautions:    Medical/Treatment Diagnosis Information:  · Diagnosis: Left knee PMM, loose body removal, chondroplasty   S83.242A  · Treatment Diagnosis: PT practice pattern: 4I,  left knee pain  Insurance/Certification information:  PT Insurance Information: Medical [place]    visits based on medical necessity,  $50/25 copay  Physician Information:  Referring Practitioner: Dr Chi Bagley of care signed (Y/N):     Date of Patient follow up with Physician: 18    G-Code (if applicable):  CK    Date G-Code Applied:  18  PT G-Codes  Functional Assessment Tool Used: LEFS  Score: 46%  Functional Limitation: Mobility: Walking and moving around  Mobility: Walking and Moving Around Current Status (): At least 40 percent but less than 60 percent impaired, limited or restricted  Mobility: Walking and Moving Around Goal Status (): At least 1 percent but less than 20 percent impaired, limited or restricted    Progress Note: [x]  Yes  []  No  Next due by: Visit #10       Latex Allergy:  [x]NO      []YES  Preferred Language for Healthcare:   [x]English       []other:    Visit # Insurance Allowable   7 BMN     Pain level:  1/10     SUBJECTIVE:  States the knee is doing well today. OBJECTIVE:   Observation: Pt. Would like to R/T Golf  Test measurements:      RESTRICTIONS/PRECAUTIONS: none    Exercises/Interventions:       DOS: 18  Script: 18 MD 18  Exercise Sets/Reps Notes Last Progression   Gastroc Stretch 5x30'' Slant -     Heelslides       LLLD Wallslide      HS Stretch:  Tableside  5x30''         LAQ          SLR Flex - staggered X 30     SLR Abd 2# 3x10       SLR Ext      SLR Add. Clamshells green.  X 30 B     Bridges + SB 30x  Arms across chest         Ankle Theraband
Extension Bilat. Ecc.                                 Inv. Hamstring Curls Bilat. Ecc.                                  Inv. Ladders      Square      Jump/Hop  Low                         Med.                         High                  Modality                         HV/CP 13' 15' 15'   ATC commun.      PLAN TO DO TPI SCREEN      Initials DBW DBW DBW

## 2018-08-24 ENCOUNTER — OFFICE VISIT (OUTPATIENT)
Dept: ORTHOPEDIC SURGERY | Age: 58
End: 2018-08-24

## 2018-08-24 VITALS — BODY MASS INDEX: 24.91 KG/M2 | WEIGHT: 174 LBS | HEIGHT: 70 IN

## 2018-08-24 DIAGNOSIS — S83.242D ACUTE MEDIAL MENISCUS TEAR OF LEFT KNEE, SUBSEQUENT ENCOUNTER: Primary | ICD-10-CM

## 2018-08-24 PROCEDURE — 99024 POSTOP FOLLOW-UP VISIT: CPT | Performed by: ORTHOPAEDIC SURGERY

## 2018-08-24 NOTE — PROGRESS NOTES
Chief Complaint    Follow-up (left knee)      History of Present Illness:  Danielle Craig is a 62 y.o. female. Follow-up left knee. Status post left knee arthroscopy with partial medial meniscectomy. Doing well at this time. Taking good progress with physical therapy. She has started to return back to doing some golfing activities. Denies having any increasing pain within the knee. She did have a fall where she scraped the front of the knee but does appear to be healing well           Medical History:  Patient's medications, allergies, past medical, surgical, social and family histories were reviewed and updated as appropriate. Review of Systems:  Pertinent items are noted in HPI  Review of systems reviewed from Patient History Form dated on 8/24/18 and available in the patient's chart under the Media tab. Vital Signs:  Ht 5' 10\" (1.778 m)   Wt 174 lb (78.9 kg)   LMP 07/28/2012   BMI 24.97 kg/m²     General Exam:   Constitutional: Patient is adequately groomed with no evidence of malnutrition  DTRs: Deep tendon reflexes are intact  Mental Status: The patient is oriented to time, place and person. The patient's mood and affect are appropriate. Knee Examination:    Inspection:  Surgical incisions well-healed. Abrasion over the anterior medial aspect of the knee that is scabbed over with no evidence of infection    Palpation:  No palpable effusion within the knee today    Range of Motion:  0-130°    Strength:  Able to do straight leg raise    Special Tests:  Negative Lachman. No instability to varus and valgus stress    Skin: There are no rashes, ulcerations or lesions. Additional Comments:       Additional Examinations:         Right Lower Extremity: Examination of the right lower extremity does not show any tenderness, deformity or injury. Range of motion is unremarkable. There is no gross instability. There are no rashes, ulcerations or lesions.   Strength and tone are normal.

## 2018-08-27 ENCOUNTER — HOSPITAL ENCOUNTER (OUTPATIENT)
Dept: PHYSICAL THERAPY | Age: 58
Setting detail: THERAPIES SERIES
Discharge: HOME | End: 2018-08-27
Payer: COMMERCIAL

## 2018-08-27 PROCEDURE — 97110 THERAPEUTIC EXERCISES: CPT | Performed by: SPECIALIST/TECHNOLOGIST

## 2018-08-27 PROCEDURE — 97112 NEUROMUSCULAR REEDUCATION: CPT | Performed by: SPECIALIST/TECHNOLOGIST

## 2018-08-27 NOTE — FLOWSHEET NOTE
functional goals listed. [] Progression is slowed due to complexities listed. [] Progression has been slowed due to co-morbidities. [x] Plan just implemented, too soon to assess goals progression  [] Other:     ASSESSMENT:  Pt. Was fatigued at the end of tx.     Treatment/Activity Tolerance:  [x] Patient tolerated treatment well [] Patient limited by fatique  [] Patient limited by pain  [] Patient limited by other medical complications  [] Other:     Prognosis: [x] Good [] Fair  [] Poor    Patient Requires Follow-up: [x] Yes  [] No    PLAN FOR NEXT SESSION:     PLAN:   [x] Continue per plan of care [] Alter current plan (see comments)  [] Plan of care initiated [] Hold pending MD visit [] Discharge    Electronically signed by: Teetee Ozuna, [address], Charley Horowitz PT [id number]

## 2018-08-30 ENCOUNTER — HOSPITAL ENCOUNTER (OUTPATIENT)
Dept: PHYSICAL THERAPY | Age: 58
Setting detail: THERAPIES SERIES
Discharge: HOME | End: 2018-08-30
Payer: COMMERCIAL

## 2018-08-30 PROCEDURE — G8980 MOBILITY D/C STATUS: HCPCS | Performed by: SPECIALIST/TECHNOLOGIST

## 2018-08-30 PROCEDURE — 97112 NEUROMUSCULAR REEDUCATION: CPT | Performed by: SPECIALIST/TECHNOLOGIST

## 2018-08-30 PROCEDURE — 97110 THERAPEUTIC EXERCISES: CPT | Performed by: SPECIALIST/TECHNOLOGIST

## 2018-08-30 PROCEDURE — G8979 MOBILITY GOAL STATUS: HCPCS | Performed by: SPECIALIST/TECHNOLOGIST

## 2018-08-30 NOTE — FLOWSHEET NOTE
lunges + ball rotations 6# x 2 laps     Added ball          Lateral Walk, monster green. Loop 2 laps each      Squats      Single Leg Stance Balance 3 x 30\" + airex               ATC area  performed         Therapeutic Exercise and NMR EXR  [x] (83398) Provided verbal/tactile cueing for activities related to strengthening, flexibility, endurance, ROM for improvements in LE, proximal hip, and core control with self care, mobility, lifting, ambulation. [x] (84063) Provided verbal/tactile cueing for activities related to improving balance, coordination, kinesthetic sense, posture, motor skill, proprioception  to assist with LE, proximal hip, and core control in self care, mobility, lifting, ambulation and eccentric single leg control.      NMR and Therapeutic Activities:    [] (01151 or 69079) Provided verbal/tactile cueing for activities related to improving balance, coordination, kinesthetic sense, posture, motor skill, proprioception and motor activation to allow for proper function of core, proximal hip and LE with self care and ADLs  [] (39829) Gait Re-education- Provided training and instruction to the patient for proper LE, core and proximal hip recruitment and positioning and eccentric body weight control with ambulation re-education including up and down stairs     Home Exercise Program:    [x] (69883) Reviewed/Progressed HEP activities related to strengthening, flexibility, endurance, ROM of core, proximal hip and LE for functional self-care, mobility, lifting and ambulation/stair navigation   [] (69682)Reviewed/Progressed HEP activities related to improving balance, coordination, kinesthetic sense, posture, motor skill, proprioception of core, proximal hip and LE for self care, mobility, lifting, and ambulation/stair navigation      Manual Treatments:  PROM / Scar Mobs / STM/Rollerstick / Knee (Flex./Ext.)  Stretch: H.S. / ITB / Piriformis / Quad / Groin / Hip Flexor   [] (57771) Provided manual therapy to mobilize LE, proximal hip and/or LS spine soft tissue/joints for the purpose of modulating pain, promoting relaxation,  increasing ROM, reducing/eliminating soft tissue swelling/inflammation/restriction, improving soft tissue extensibility and allowing for proper ROM for normal function with self care, mobility, lifting and ambulation. Modalities:  CP 10'    Charges:  Timed Code Treatment Minutes: 40   Total Treatment Minutes: 50      [] EVAL (LOW) 29758 (typically 20 minutes face-to-face)  [] EVAL (MOD) 69912 (typically 30 minutes face-to-face)  [] EVAL (HIGH) 31785 (typically 45 minutes face-to-face)  [] RE-EVAL     [x] XL(54652) x  2   [] IONTO   [x] NMR (64614) x  1   [] VASO  [] Manual (87629) x       [] Other:  [] TA x       [] Mech Traction (95170)  [] ES(attended) (08205)      [] ES (un) (62039):     GOALS:   Short Term Goals: To be achieved in: 2 weeks  1. Independent in HEP and progression per patient tolerance, in order to prevent re-injury. 2. Patient will have a decrease in pain to facilitate improvement in movement, function, and ADLs as indicated by Functional Deficits. Long Term Goals: To be achieved in: 5 weeks  1. Disability index score of 20% or less for the LEFS to assist with reaching prior level of function. 2. Patient will demonstrate increased AROM to 0-135 to allow for proper joint functioning as indicated by patients Functional Deficits. 3. Patient will demonstrate an increase in Strength to good quad tone, 5/5 knee ext/hip abd to allow for proper functional mobility as indicated by patients Functional Deficits. 4. Patient will return to walking up/down one flight of stairs without increased symptoms or restriction. Progression Towards Functional goals:  [] Patient is progressing as expected towards functional goals listed. [] Progression is slowed due to complexities listed. [] Progression has been slowed due to co-morbidities.   [x] Plan just implemented, too soon to

## 2018-08-30 NOTE — DISCHARGE SUMMARY
[]Neuropathy (G60.9)   Pulmonary conditions   []Asthma (J45)  []Coughing   []COPD (J44.9) Psychological Disorders  []Anxiety (F41.9)  []Depression (F32.9)   []Other: []Other:       Treatment to date:  [x] Therapeutic Exercise    [x] Modalities:  [] Therapeutic Activity             []Ultrasound            [x]Electrical Stimulation  [] Gait Training     []Cervical Traction    [] Lumbar Traction  [x] Neuromuscular Re-education [x] Cold/hotpack         []Iontophoresis  [x] Instruction in HEP      Other:  [x] Manual Therapy                   []                       ?           []   Assessment:  [x] All Goals were achieved. [] The following goals were achieved (#'s):  [] The following goals were not achieved for the following reasons:  Functional/assessment of improvement as it relates to each goal: ROM, strength, and pain levels with ADL's have improved. Plan of Care:  [x] Discharge from Therapy Services due to:    Reason for Discharge:   [x] All goals achieved    [] Patient having surgery  [] Physician discontinued therapy  [] Insurance/Financial Limitations [] Patient did not return for follow up visits [] Home program/1 visit only   [] No subjective or objective improvement [] Plateaued   [] Patient was unable to adhere to the plan of care established at evaluation. [] Referred back to physician for re-evaluation and did not return.      [x] Other: D/C to [facility] program?       Electronically signed by:  Bonifacio Bartlett PT [id number]

## 2018-09-05 ENCOUNTER — TELEPHONE (OUTPATIENT)
Dept: SURGERY | Age: 58
End: 2018-09-05

## 2018-10-11 ENCOUNTER — OFFICE VISIT (OUTPATIENT)
Dept: DERMATOLOGY | Age: 58
End: 2018-10-11
Payer: COMMERCIAL

## 2018-10-11 DIAGNOSIS — L91.8 INFLAMED SKIN TAG: ICD-10-CM

## 2018-10-11 DIAGNOSIS — D17.1 LIPOMA OF BACK: Primary | ICD-10-CM

## 2018-10-11 DIAGNOSIS — R20.9 DISTURBANCE OF SKIN SENSATION: ICD-10-CM

## 2018-10-11 PROCEDURE — 11406 EXC TR-EXT B9+MARG >4.0 CM: CPT | Performed by: DERMATOLOGY

## 2018-10-11 PROCEDURE — 12032 INTMD RPR S/A/T/EXT 2.6-7.5: CPT | Performed by: DERMATOLOGY

## 2018-10-11 PROCEDURE — 11200 RMVL SKIN TAGS UP TO&INC 15: CPT | Performed by: DERMATOLOGY

## 2018-10-11 RX ORDER — MINOCYCLINE HYDROCHLORIDE 100 MG/1
CAPSULE ORAL
Qty: 20 CAPSULE | Refills: 0 | Status: SHIPPED | OUTPATIENT
Start: 2018-10-11 | End: 2019-03-12 | Stop reason: ALTCHOICE

## 2018-10-11 NOTE — PROGRESS NOTES
tablet Take 2 tablets by mouth every 6 hours as needed for Pain 60 tablet 3    rosuvastatin (CRESTOR) 20 MG tablet Take 1 tablet by mouth daily 90 tablet 3    Probiotic Product (PROBIOTIC-10 ULTIMATE PO) Take by mouth      Vitamin D (CHOLECALCIFEROL) 1000 UNITS CAPS capsule Take 1,000 Units by mouth daily. Allergies   Allergen Reactions    Penicillins Hives    Sulfa Antibiotics Hives         Physical Examination     Gen, well-appearing    L upper eyelid with 3 tiny skin-colored papules  L medial scapular area with ~ 5.5 cm subcut mobile mass            Assessment and Plan     1. Lipoma - L upper back - enlarging and symptomatic  - excision today after procedure discussed and risks reviewed including incomplete removal, recurrence, scar  educ risk bleed, infxn, scar   area anesth with lido with epi and prepped in sterile manner   incision to superficial SQ with dissection of encapsulated lipomatous mass   Additional 6 cc lido with epi needed in focal areas for adequate anesthesia mid-procedure  specimen to path   edges undermined and hemostasis achieved with electrodessication   wound closed with layered closure 3-0 deep PDS sutures and dermabond - 4.8 cm  pressure dressing applied   pt educ re wnd care and suture removal   - given minocycline to take empirically since lesion was large with more extensive undermining/dissection and need for additional anesthesia mid-procedure    2. Irritated skin tags - L upper eyelid - 3  - lesion(s) on the upper eyelid treated with light ED after [facility] for good pain control. Patient educated on risk of blister, hypopigmentation/scar and wound care.

## 2018-10-11 NOTE — PATIENT INSTRUCTIONS
Surgical Wound Care Instructions    Sutured Wounds:   After your surgery, go home and take it easy. Please refrain from any vigorous physical activity or heavy lifting.  Leave the pressure bandage in place for 48 hours. If it starts to detach, reinforce the bandage with another piece of tape.  Please keep the bandage dry for 48 hours.  After 48 hours, the wound can get wet. Clean the area daily using mild soapy water and a gauze pad or cotton tipped applicator, applying gentle pressure.  DO NOT Apply ointment, aquaphor/Vaseline, lotions, cream, oil  or petroleum jelly to the surgical glue.  Cut a Telfa pad in the shape of the wound but slightly larger and secure with tape. Special Sites:   Facial sites:  o Keep the wound elevated for the first 2 nights.  o Do not sleep on the side of the body where the wound is located. o Do not bend your head lower than your heart or engage in heavy lifting.  Leg:  o Keep the leg elevated when reclining, using a pillow to elevate the extremity. Complications:   If bleeding develops when you go home, apply pressure for 15 minutes continuously. A small amount of ice in a bag covered with a towel may be used for another 10 minutes if necessary. If the bleeding does not stop, please call your dermatologist.   Please call the office if you develop any fevers, chills or pus drains from the wound.  A small amount of redness at the site of the surgery is normal at first, but if the redness begins to spread and/or pain worsens, you may have an infection and need to call the office.

## 2018-10-16 LAB — DERMATOLOGY PATHOLOGY REPORT: NORMAL

## 2018-11-13 ENCOUNTER — OFFICE VISIT (OUTPATIENT)
Dept: FAMILY MEDICINE CLINIC | Age: 58
End: 2018-11-13
Payer: COMMERCIAL

## 2018-11-13 VITALS
BODY MASS INDEX: 25.17 KG/M2 | SYSTOLIC BLOOD PRESSURE: 124 MMHG | WEIGHT: 175.8 LBS | HEIGHT: 70 IN | OXYGEN SATURATION: 98 % | DIASTOLIC BLOOD PRESSURE: 80 MMHG | HEART RATE: 65 BPM

## 2018-11-13 DIAGNOSIS — E78.2 MIXED HYPERLIPIDEMIA: Primary | ICD-10-CM

## 2018-11-13 PROCEDURE — 99213 OFFICE O/P EST LOW 20 MIN: CPT | Performed by: FAMILY MEDICINE

## 2018-12-07 ENCOUNTER — NURSE TRIAGE (OUTPATIENT)
Dept: OTHER | Facility: CLINIC | Age: 58
End: 2018-12-07

## 2018-12-18 ENCOUNTER — PATIENT MESSAGE (OUTPATIENT)
Dept: FAMILY MEDICINE CLINIC | Age: 58
End: 2018-12-18

## 2018-12-18 DIAGNOSIS — E78.2 MIXED HYPERLIPIDEMIA: ICD-10-CM

## 2018-12-18 RX ORDER — ROSUVASTATIN CALCIUM 20 MG/1
20 TABLET, COATED ORAL DAILY
Qty: 90 TABLET | Refills: 3 | Status: SHIPPED | OUTPATIENT
Start: 2018-12-18 | End: 2019-06-06 | Stop reason: SDUPTHER

## 2019-03-12 ENCOUNTER — OFFICE VISIT (OUTPATIENT)
Dept: FAMILY MEDICINE CLINIC | Age: 59
End: 2019-03-12
Payer: COMMERCIAL

## 2019-03-12 VITALS
HEIGHT: 70 IN | SYSTOLIC BLOOD PRESSURE: 118 MMHG | OXYGEN SATURATION: 98 % | HEART RATE: 58 BPM | BODY MASS INDEX: 23.88 KG/M2 | WEIGHT: 166.8 LBS | DIASTOLIC BLOOD PRESSURE: 76 MMHG

## 2019-03-12 DIAGNOSIS — Z00.00 ANNUAL PHYSICAL EXAM: Primary | ICD-10-CM

## 2019-03-12 DIAGNOSIS — E78.2 MIXED HYPERLIPIDEMIA: ICD-10-CM

## 2019-03-12 PROCEDURE — 99396 PREV VISIT EST AGE 40-64: CPT | Performed by: FAMILY MEDICINE

## 2019-03-12 ASSESSMENT — PATIENT HEALTH QUESTIONNAIRE - PHQ9
SUM OF ALL RESPONSES TO PHQ QUESTIONS 1-9: 0
2. FEELING DOWN, DEPRESSED OR HOPELESS: 0
1. LITTLE INTEREST OR PLEASURE IN DOING THINGS: 0
SUM OF ALL RESPONSES TO PHQ9 QUESTIONS 1 & 2: 0
SUM OF ALL RESPONSES TO PHQ QUESTIONS 1-9: 0

## 2019-03-12 ASSESSMENT — ENCOUNTER SYMPTOMS: RESPIRATORY NEGATIVE: 1

## 2019-03-13 DIAGNOSIS — Z00.00 ANNUAL PHYSICAL EXAM: ICD-10-CM

## 2019-03-13 LAB
A/G RATIO: 1.9 (ref 1.1–2.2)
ALBUMIN SERPL-MCNC: 4.8 G/DL (ref 3.4–5)
ALP BLD-CCNC: 63 U/L (ref 40–129)
ALT SERPL-CCNC: 14 U/L (ref 10–40)
ANION GAP SERPL CALCULATED.3IONS-SCNC: 11 MMOL/L (ref 3–16)
AST SERPL-CCNC: 16 U/L (ref 15–37)
BILIRUB SERPL-MCNC: 0.7 MG/DL (ref 0–1)
BUN BLDV-MCNC: 10 MG/DL (ref 7–20)
CALCIUM SERPL-MCNC: 10.1 MG/DL (ref 8.3–10.6)
CHLORIDE BLD-SCNC: 98 MMOL/L (ref 99–110)
CHOLESTEROL, TOTAL: 195 MG/DL (ref 0–199)
CO2: 29 MMOL/L (ref 21–32)
CREAT SERPL-MCNC: 0.7 MG/DL (ref 0.6–1.1)
GFR AFRICAN AMERICAN: >60
GFR NON-AFRICAN AMERICAN: >60
GLOBULIN: 2.5 G/DL
GLUCOSE BLD-MCNC: 85 MG/DL (ref 70–99)
HDLC SERPL-MCNC: 41 MG/DL (ref 40–60)
LDL CHOLESTEROL CALCULATED: 128 MG/DL
POTASSIUM SERPL-SCNC: 4.2 MMOL/L (ref 3.5–5.1)
SODIUM BLD-SCNC: 138 MMOL/L (ref 136–145)
TOTAL PROTEIN: 7.3 G/DL (ref 6.4–8.2)
TRIGL SERPL-MCNC: 132 MG/DL (ref 0–150)
VLDLC SERPL CALC-MCNC: 26 MG/DL

## 2019-04-17 ENCOUNTER — OFFICE VISIT (OUTPATIENT)
Dept: GYNECOLOGY | Age: 59
End: 2019-04-17
Payer: COMMERCIAL

## 2019-04-17 VITALS
SYSTOLIC BLOOD PRESSURE: 114 MMHG | BODY MASS INDEX: 23.62 KG/M2 | WEIGHT: 165 LBS | HEART RATE: 53 BPM | HEIGHT: 70 IN | DIASTOLIC BLOOD PRESSURE: 72 MMHG | RESPIRATION RATE: 17 BRPM

## 2019-04-17 DIAGNOSIS — Z01.419 WELL WOMAN EXAM WITH ROUTINE GYNECOLOGICAL EXAM: Primary | ICD-10-CM

## 2019-04-17 PROCEDURE — 99396 PREV VISIT EST AGE 40-64: CPT | Performed by: OBSTETRICS & GYNECOLOGY

## 2019-04-17 ASSESSMENT — ENCOUNTER SYMPTOMS
GASTROINTESTINAL NEGATIVE: 1
RESPIRATORY NEGATIVE: 1
EYES NEGATIVE: 1

## 2019-04-18 NOTE — PROGRESS NOTES
Subjective:      Patient ID: Concepción Anderson is a 62 y.o. female. Patient is here for annual. Patient in menopause. Review of Systems   Constitutional: Negative. HENT: Negative. Eyes: Negative. Respiratory: Negative. Cardiovascular: Negative. Gastrointestinal: Negative. Genitourinary: Negative. Musculoskeletal: Negative. Skin: Negative. Neurological: Negative. Psychiatric/Behavioral: Negative. Date of Birth 1960  Past Medical History:   Diagnosis Date    Chicken pox     Dyspareunia     HPV (human papilloma virus) anogenital infection     Recurrent cold sores     STD (sexually transmitted disease)     HPV     Past Surgical History:   Procedure Laterality Date    COLPOSCOPY      KNEE ARTHROSCOPY Left 2018    LEEP      SKIN TAG REMOVAL      TOOTH EXTRACTION       OB History    Para Term  AB Living   3 3 3     3   SAB TAB Ectopic Molar Multiple Live Births             3      # Outcome Date GA Lbr Harish/2nd Weight Sex Delivery Anes PTL Lv   3 Term 26    F Vag-Spont   MICHAEL   2 Term 1985    M Vag-Spont   MICHAEL   1 Term 26    M Vag-Spont   MICHAEL     Social History     Socioeconomic History    Marital status:      Spouse name: Not on file    Number of children: Not on file    Years of education: Not on file    Highest education level: Not on file   Occupational History    Not on file   Social Needs    Financial resource strain: Not on file    Food insecurity:     Worry: Not on file     Inability: Not on file    Transportation needs:     Medical: Not on file     Non-medical: Not on file   Tobacco Use    Smoking status: Never Smoker    Smokeless tobacco: Never Used   Substance and Sexual Activity    Alcohol use:  Yes     Alcohol/week: 4.8 oz     Types: 4 Cans of beer, 4 Standard drinks or equivalent per week     Comment: occasionally    Drug use: No    Sexual activity: Yes     Partners: Male   Lifestyle    Physical activity:     Days  Hypertension Neg Hx     Migraines Neg Hx     Ovarian Cancer Neg Hx     Rashes/Skin Problems Neg Hx     Seizures Neg Hx     Stroke Neg Hx     Thyroid Disease Neg Hx      /72 (Site: Right Upper Arm, Position: Sitting, Cuff Size: Medium Adult)   Pulse 53   Resp 17   Ht 5' 10\" (1.778 m)   Wt 165 lb (74.8 kg)   LMP 07/28/2012   Breastfeeding? No   BMI 23.68 kg/m²       Objective:   Physical Exam   Constitutional: She is oriented to person, place, and time. She appears well-developed and well-nourished. No distress. HENT:   Head: Normocephalic and atraumatic. Eyes: Pupils are equal, round, and reactive to light. EOM are normal.   Neck: Normal range of motion. Neck supple. No thyromegaly present. Cardiovascular: Normal rate, regular rhythm and normal heart sounds. Exam reveals no gallop and no friction rub. No murmur heard. Pulmonary/Chest: Effort normal and breath sounds normal. No respiratory distress. She has no wheezes. She has no rales. No breast tenderness, discharge or bleeding. Abdominal: Soft. Bowel sounds are normal. She exhibits no distension and no mass. There is no hepatomegaly. There is no tenderness. There is no rebound and no guarding. No hernia. Genitourinary: Rectum normal, vagina normal and uterus normal. Rectal exam shows no external hemorrhoid, no internal hemorrhoid, no fissure, no mass, no tenderness and guaiac negative stool. No breast tenderness, discharge or bleeding. There is no rash, tenderness, lesion or injury on the right labia. There is no rash, tenderness, lesion or injury on the left labia. Uterus is not deviated, not enlarged, not fixed and not tender. Cervix exhibits no motion tenderness, no discharge and no friability. Right adnexum displays no mass, no tenderness and no fullness. Left adnexum displays no mass, no tenderness and no fullness. No erythema, tenderness or bleeding in the vagina. No foreign body in the vagina.  No signs of injury around the

## 2019-04-22 ENCOUNTER — HOSPITAL ENCOUNTER (OUTPATIENT)
Dept: MAMMOGRAPHY | Age: 59
Discharge: HOME | End: 2019-04-22
Payer: COMMERCIAL

## 2019-04-22 DIAGNOSIS — Z01.419 WELL WOMAN EXAM WITH ROUTINE GYNECOLOGICAL EXAM: ICD-10-CM

## 2019-04-22 PROCEDURE — 77063 BREAST TOMOSYNTHESIS BI: CPT

## 2019-04-29 ENCOUNTER — OFFICE VISIT (OUTPATIENT)
Dept: PRIMARY CARE CLINIC | Age: 59
End: 2019-04-29
Payer: COMMERCIAL

## 2019-04-29 VITALS
DIASTOLIC BLOOD PRESSURE: 70 MMHG | BODY MASS INDEX: 23.19 KG/M2 | SYSTOLIC BLOOD PRESSURE: 103 MMHG | HEART RATE: 60 BPM | WEIGHT: 162 LBS | HEIGHT: 70 IN | TEMPERATURE: 98.1 F | RESPIRATION RATE: 16 BRPM | OXYGEN SATURATION: 98 %

## 2019-04-29 DIAGNOSIS — J06.9 UPPER RESPIRATORY TRACT INFECTION, UNSPECIFIED TYPE: Primary | ICD-10-CM

## 2019-04-29 PROCEDURE — 99214 OFFICE O/P EST MOD 30 MIN: CPT | Performed by: EMERGENCY MEDICINE

## 2019-04-29 ASSESSMENT — ENCOUNTER SYMPTOMS
SINUS PAIN: 0
COUGH: 1

## 2019-04-29 NOTE — PROGRESS NOTES
Subjective:      Patient ID: Wily Rowley is a 62 y.o. female. Cough   This is a new problem. Episode onset: 10 days ago. The problem has been waxing and waning. The problem occurs every few minutes. The cough is non-productive. Pertinent negatives include no chills, ear pain, fever, headaches or nasal congestion. Nothing aggravates the symptoms. She has tried OTC cough suppressant for the symptoms. The treatment provided mild relief. Review of Systems   Constitutional: Negative for chills and fever. HENT: Positive for congestion. Negative for ear pain and sinus pain. Respiratory: Positive for cough. Neurological: Negative for headaches. All other systems reviewed and are negative. Past Medical History:   Diagnosis Date    Allergic rhinitis     Chicken pox     Dyspareunia     HPV (human papilloma virus) anogenital infection     Hyperlipidemia     Recurrent cold sores     STD (sexually transmitted disease)     HPV     Social History     Socioeconomic History    Marital status:      Spouse name: Not on file    Number of children: Not on file    Years of education: Not on file    Highest education level: Not on file   Occupational History    Not on file   Social Needs    Financial resource strain: Not on file    Food insecurity:     Worry: Not on file     Inability: Not on file    Transportation needs:     Medical: Not on file     Non-medical: Not on file   Tobacco Use    Smoking status: Never Smoker    Smokeless tobacco: Never Used   Substance and Sexual Activity    Alcohol use:  Yes     Alcohol/week: 4.8 oz     Types: 4 Cans of beer, 4 Standard drinks or equivalent per week     Comment: occasionally    Drug use: No    Sexual activity: Yes     Partners: Male   Lifestyle    Physical activity:     Days per week: Not on file     Minutes per session: Not on file    Stress: Not on file   Relationships    Social connections:     Talks on phone: Not on file     Gets together: Not on file     Attends Mandaen service: Not on file     Active member of club or organization: Not on file     Attends meetings of clubs or organizations: Not on file     Relationship status: Not on file    Intimate partner violence:     Fear of current or ex partner: Not on file     Emotionally abused: Not on file     Physically abused: Not on file     Forced sexual activity: Not on file   Other Topics Concern    Not on file   Social History Narrative    Not on file     Family History   Problem Relation Age of Onset    High Blood Pressure Mother     High Cholesterol Mother     Depression Mother     Arthritis Father     Heart Disease Father     High Blood Pressure Father     High Cholesterol Father     Kidney Disease Father     Cancer Father         testicle    Heart Disease Maternal Grandfather     Heart Disease Paternal Grandfather     Heart Disease Brother         MI at 48    High Cholesterol Brother     High Cholesterol Sister     Rheum Arthritis Neg Hx     Osteoarthritis Neg Hx     Asthma Neg Hx     Breast Cancer Neg Hx     Diabetes Neg Hx     Heart Failure Neg Hx     Hypertension Neg Hx     Migraines Neg Hx     Ovarian Cancer Neg Hx     Rashes/Skin Problems Neg Hx     Seizures Neg Hx     Stroke Neg Hx     Thyroid Disease Neg Hx        Objective:   Physical Exam   Constitutional: She is oriented to person, place, and time. She appears well-developed and well-nourished. No distress. HENT:   Head: Normocephalic and atraumatic. Right Ear: External ear normal.   Left Ear: External ear normal.   Nose: Nose normal.   Mouth/Throat: Oropharynx is clear and moist.   Eyes: Pupils are equal, round, and reactive to light. Conjunctivae and EOM are normal.   Neck: Normal range of motion. Neck supple. Cardiovascular: Normal rate, regular rhythm, normal heart sounds and intact distal pulses. Pulmonary/Chest: Effort normal and breath sounds normal. No respiratory distress.  She has no wheezes. She has no rales. Musculoskeletal: Normal range of motion. Neurological: She is alert and oriented to person, place, and time. She has normal reflexes. No cranial nerve deficit. She exhibits normal muscle tone. Coordination normal.   Skin: Skin is warm and dry. She is not diaphoretic. Psychiatric: She has a normal mood and affect. Her behavior is normal. Judgment and thought content normal.   Nursing note and vitals reviewed. Assessment:      1. Upper respiratory tract infection, unspecified type            Plan:      Ramone Sullivan was seen today for cough. Diagnoses and all orders for this visit:    Upper respiratory tract infection, unspecified type    I explained to the patient that she likely has a viral URI. She was offered but declined any medications for symptoms.           Jagruti Villagomez MD

## 2019-04-29 NOTE — PATIENT INSTRUCTIONS
Follow-up with your primary care physician in 10 days. If you do not have a primary care physician, call [phone] for a referral or visit [email or web address]    Patient Education        Upper Respiratory Infection (Cold): Care Instructions  Your Care Instructions    An upper respiratory infection, or URI, is an infection of the nose, sinuses, or throat. URIs are spread by coughs, sneezes, and direct contact. The common cold is the most frequent kind of URI. The flu and sinus infections are other kinds of URIs. Almost all URIs are caused by viruses. Antibiotics won't cure them. But you can treat most infections with home care. This may include drinking lots of fluids and taking over-the-counter pain medicine. You will probably feel better in 4 to 10 days. The doctor has checked you carefully, but problems can develop later. If you notice any problems or new symptoms, get medical treatment right away. Follow-up care is a key part of your treatment and safety. Be sure to make and go to all appointments, and call your doctor if you are having problems. It's also a good idea to know your test results and keep a list of the medicines you take. How can you care for yourself at home? · To prevent dehydration, drink plenty of fluids, enough so that your urine is light yellow or clear like water. Choose water and other caffeine-free clear liquids until you feel better. If you have kidney, heart, or liver disease and have to limit fluids, talk with your doctor before you increase the amount of fluids you drink. · Take an over-the-counter pain medicine, such as acetaminophen (Tylenol), ibuprofen (Advil, Motrin), or naproxen (Aleve). Read and follow all instructions on the label. · Before you use cough and cold medicines, check the label. These medicines may not be safe for young children or for people with certain health problems.   · Be careful when taking over-the-counter cold or flu medicines and Tylenol at the same time. Many of these medicines have acetaminophen, which is Tylenol. Read the labels to make sure that you are not taking more than the recommended dose. Too much acetaminophen (Tylenol) can be harmful. · Get plenty of rest.  · Do not smoke or allow others to smoke around you. If you need help quitting, talk to your doctor about stop-smoking programs and medicines. These can increase your chances of quitting for good. When should you call for help? Call 911 anytime you think you may need emergency care. For example, call if:    · You have severe trouble breathing.    Call your doctor now or seek immediate medical care if:    · You seem to be getting much sicker.     · You have new or worse trouble breathing.     · You have a new or higher fever.     · You have a new rash.    Watch closely for changes in your health, and be sure to contact your doctor if:    · You have a new symptom, such as a sore throat, an earache, or sinus pain.     · You cough more deeply or more often, especially if you notice more mucus or a change in the color of your mucus.     · You do not get better as expected. Where can you learn more? Go to https://Intrinsic Medical ImagingpeNoiseFree.Gradeable. org and sign in to your [employer] account. Enter Y750 in the [employer] box to learn more about \"Upper Respiratory Infection (Cold): Care Instructions. \"     If you do not have an account, please click on the \"Sign Up Now\" link. Current as of: September 5, 2018  Content Version: 11.9  © 7242-6070 [employer]. Care instructions adapted under license by [facility] ([facility]). If you have questions about a medical condition or this instruction, always ask your healthcare professional. [place] [address] any warranty or liability for your use of this information.

## 2019-05-15 ENCOUNTER — PATIENT MESSAGE (OUTPATIENT)
Dept: FAMILY MEDICINE CLINIC | Age: 59
End: 2019-05-15

## 2019-05-15 DIAGNOSIS — B00.1 RECURRENT COLD SORES: ICD-10-CM

## 2019-05-15 RX ORDER — VALACYCLOVIR HYDROCHLORIDE 1 G/1
TABLET, FILM COATED ORAL
Qty: 36 TABLET | Refills: 1 | Status: SHIPPED | OUTPATIENT
Start: 2019-05-15 | End: 2020-11-06

## 2019-05-15 NOTE — TELEPHONE ENCOUNTER
From: Ute Bartlett  To: Lilian Uribe MD  Sent: 5/15/2019 7:24 AM EDT  Subject: Prescription Question    Dr Alfonso Rice    I would like to get a new prescription for a 90 day supply for our mail order from [facility] for my Valacyclovir HCL. My present one has . Can you write it and send it to [facility] order [facility]?     Thank you    Miracle Palomares

## 2019-05-28 ENCOUNTER — PATIENT MESSAGE (OUTPATIENT)
Dept: FAMILY MEDICINE CLINIC | Age: 59
End: 2019-05-28

## 2019-05-29 NOTE — TELEPHONE ENCOUNTER
From: Daryn Enriquez  To: Tracy Lorenzana MD  Sent: 5/28/2019 5:04 PM EDT  Subject: Non-Urgent Medical Question    Dr Kenneth Leos:    Phoenix Benson had one of the two shingle vaccines. Do I need a Rx to get the second one? If so, can you send me one and I'll get it filled at the [facility] here at [facility] where I received the first one.   Thanks,  Princess De Luna

## 2019-05-30 ENCOUNTER — EMPLOYEE WELLNESS (OUTPATIENT)
Dept: OTHER | Age: 59
End: 2019-05-30

## 2019-05-30 LAB
CHOLESTEROL, TOTAL: 166 MG/DL (ref 0–199)
GLUCOSE BLD-MCNC: 84 MG/DL (ref 70–99)
HDLC SERPL-MCNC: 44 MG/DL (ref 40–60)
LDL CHOLESTEROL CALCULATED: 99 MG/DL
TRIGL SERPL-MCNC: 117 MG/DL (ref 0–150)

## 2019-06-03 VITALS — WEIGHT: 167 LBS | BODY MASS INDEX: 23.96 KG/M2

## 2019-06-06 ENCOUNTER — PATIENT MESSAGE (OUTPATIENT)
Dept: FAMILY MEDICINE CLINIC | Age: 59
End: 2019-06-06

## 2019-06-06 DIAGNOSIS — E78.2 MIXED HYPERLIPIDEMIA: ICD-10-CM

## 2019-06-06 NOTE — TELEPHONE ENCOUNTER
From: Katharine Corporal  To: Vikram Pabon MD  Sent: 6/6/2019 6:47 AM EDT  Subject: Prescription Question    Dr. Oscar    I need to refill my statin prescription but have been taking half a pill since my last visit. The results from my recent lab work is good so I would like to have 10mg rather than 20mg. Can you send a RX for a 90 day supply to the [facility]) mail order Pharmacy for me?      Thank you,    Rupert Caro

## 2019-06-07 RX ORDER — ROSUVASTATIN CALCIUM 10 MG/1
10 TABLET, COATED ORAL DAILY
Qty: 90 TABLET | Refills: 1 | Status: SHIPPED | OUTPATIENT
Start: 2019-06-07 | End: 2019-12-18

## 2019-06-10 ENCOUNTER — TELEPHONE (OUTPATIENT)
Dept: FAMILY MEDICINE CLINIC | Age: 59
End: 2019-06-10

## 2019-06-10 DIAGNOSIS — E78.2 MIXED HYPERLIPIDEMIA: ICD-10-CM

## 2019-06-10 DIAGNOSIS — Z23 NEED FOR PROPHYLACTIC VACCINATION AND INOCULATION AGAINST VARICELLA: Primary | ICD-10-CM

## 2019-06-10 RX ORDER — ROSUVASTATIN CALCIUM 10 MG/1
10 TABLET, COATED ORAL DAILY
Qty: 90 TABLET | Refills: 1 | OUTPATIENT
Start: 2019-06-10

## 2019-06-10 NOTE — TELEPHONE ENCOUNTER
Patient runs the benefit program at [facility] and needs the shingles shot (2nd one) ordered as a prescription and she will get it filled at [facility]. Please put this prescription in ASAP. She will check her MyChart to make sure this goes in.

## 2019-06-24 ENCOUNTER — PATIENT MESSAGE (OUTPATIENT)
Dept: FAMILY MEDICINE CLINIC | Age: 59
End: 2019-06-24

## 2019-06-24 NOTE — TELEPHONE ENCOUNTER
From: Mata Mensah  To: Deven An MD  Sent: 6/24/2019 1:44 PM EDT  Subject: Non-Urgent Medical Question    Dr. Kerry Villalpando:     I am due for my second Shingles vaccine, which I can obtain from the [facility] at [facility]. But in order for it to be at $0 copay, it must be a prescription. I had one from Dr. Natalya Claros when he was still active and would like you to submit one for me now via [employer] so I can get it filled.      Thanks,  Lowell Drummond

## 2019-06-25 ENCOUNTER — OFFICE VISIT (OUTPATIENT)
Dept: DERMATOLOGY | Age: 59
End: 2019-06-25
Payer: COMMERCIAL

## 2019-06-25 DIAGNOSIS — B00.9 HSV INFECTION: ICD-10-CM

## 2019-06-25 DIAGNOSIS — L57.0 AK (ACTINIC KERATOSIS): ICD-10-CM

## 2019-06-25 DIAGNOSIS — D22.9 MULTIPLE NEVI: Primary | ICD-10-CM

## 2019-06-25 DIAGNOSIS — L82.1 SEBORRHEIC KERATOSIS: ICD-10-CM

## 2019-06-25 PROCEDURE — 99213 OFFICE O/P EST LOW 20 MIN: CPT | Performed by: DERMATOLOGY

## 2019-06-25 NOTE — PROGRESS NOTES
[facility]  MD [address]  1960    62 y.o. female     Date of Visit: 6/25/2019    Chief Complaint: moles, lump  Chief Complaint   Patient presents with    Skin Exam     Last seen: 1 year ago for FSE    History of Present Illness:    1. Here for evaluation of multiple asx pigmented lesions on the trunk and extremities, present for many years; no change in size/shape/color of any lesions; no bleeding lesions. 2. S/p excision of lipoma on the L upper back in 2018, present since at least [id number]. Healed well - large well-defined mass removed and path benign. Has subtle swelling remaining in this area. Asx.     3. She has a history of AK on the nose. No new concerns noted. 4. Intermittently has flares of cold sores. Had a flare on the L ala in 2017 - resolved but had a persistent erythematous area/scar from the flare. Has valtrex at home to use prn flares. No recent flares. Previously addressed:  Hx of intermittent breakouts of the chin and lower cheeks. No trx tried. Hx of HSV - has valtrex at home. No personal or family history of skin cancer. 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 Review of Systems:  Gen: Feels well, good sense of health. Skin: No changing moles or lesions. Past Medical History, Family History, Surgical History, Medications and Allergies reviewed.     Past Medical History:   Diagnosis Date    Allergic rhinitis     Chicken pox     Dyspareunia     HPV (human papilloma virus) anogenital infection     Hyperlipidemia     Recurrent cold sores     STD (sexually transmitted disease)     HPV       Past Surgical History:   Procedure Laterality Date    COLPOSCOPY      KNEE ARTHROSCOPY Left 07/26/2018    LEEP      SKIN TAG REMOVAL      TOOTH EXTRACTION         Outpatient Medications Marked as Taking for the 6/25/19 encounter (Office Visit) with Fiordaliza Leija MD   Medication Sig Dispense Refill  rosuvastatin (CRESTOR) 10 MG tablet Take 1 tablet by mouth daily 90 tablet 1    Vitamin D (CHOLECALCIFEROL) 1000 UNITS CAPS capsule Take 1,000 Units by mouth daily. Allergies   Allergen Reactions    Penicillins Hives    Sulfa Antibiotics Hives         Physical Examination     Gen, well-appearing  The following were examined and determined to be normal: Psych/Neuro, Scalp/hair, Conjunctivae/eyelids, Gums/teeth/lips, Neck, Breast/axilla/chest, Abdomen, RUE, LUE, RLE, LLE, Nails/digits and buttocks. Head/face   The following were examined and determined to be abnormal:  Back. trunk and extremities with scattered brown macules and papules   L medial scapular area with subtle swelling and well-approximated linear scar  No AK's  L ala clear; no hSV    Assessment and Plan     1. Benign-appearing nevi and SK's  - educ re ABCD's of MM   educ sun protection   encouraged skin check yearly (sooner if indicated), self checks    2. Lipoma - L upper back - excised    3. Hx of AK's - clear today  - cont sun protection    4.  Hx of HSV flare; clear today  - reassured regarding erythematous scar on the L ala  - consider Vbeam if persistently erythematous  - cont valtrex prn flares

## 2019-09-16 ENCOUNTER — OFFICE VISIT (OUTPATIENT)
Dept: PRIMARY CARE CLINIC | Age: 59
End: 2019-09-16
Payer: COMMERCIAL

## 2019-09-16 VITALS
HEART RATE: 58 BPM | RESPIRATION RATE: 12 BRPM | DIASTOLIC BLOOD PRESSURE: 65 MMHG | HEIGHT: 70 IN | TEMPERATURE: 96 F | WEIGHT: 169 LBS | BODY MASS INDEX: 24.2 KG/M2 | SYSTOLIC BLOOD PRESSURE: 118 MMHG

## 2019-09-16 DIAGNOSIS — R42 DIZZINESS: Primary | ICD-10-CM

## 2019-09-16 PROCEDURE — 99214 OFFICE O/P EST MOD 30 MIN: CPT | Performed by: EMERGENCY MEDICINE

## 2019-09-16 PROCEDURE — 82962 GLUCOSE BLOOD TEST: CPT | Performed by: EMERGENCY MEDICINE

## 2019-09-16 RX ORDER — MECLIZINE HYDROCHLORIDE 25 MG/1
25 TABLET ORAL 3 TIMES DAILY PRN
Qty: 15 TABLET | Refills: 0 | Status: SHIPPED | OUTPATIENT
Start: 2019-09-16 | End: 2019-09-26

## 2019-09-16 ASSESSMENT — ENCOUNTER SYMPTOMS
ABDOMINAL PAIN: 0
COUGH: 0
SORE THROAT: 0
VOMITING: 0
DIARRHEA: 1
CHANGE IN BOWEL HABIT: 1
RHINORRHEA: 0
SINUS PRESSURE: 0
NAUSEA: 1

## 2019-09-16 NOTE — PATIENT INSTRUCTIONS
[employer], [facility] disclaims any warranty or liability for your use of this information. Patient Education        Epley Maneuver at Home for Vertigo: Exercises  Introduction  Vertigo is a spinning or whirling sensation when you move your head. Your doctor may have moved you in different positions to help your vertigo get better faster. This is called the Epley maneuver. Your doctor also may have asked you to do these exercises at home. Do the exercises as often as your doctor recommends. If your vertigo is getting worse, your doctor may have you change the exercise or stop it. Step 1  Step 1    1. Sit on the edge of a bed or sofa. Step 2    1. Turn your head 45 degrees in the direction your doctor told you to. This should be toward the ear that causes the most vertigo for you. In this picture, the woman is turning toward her left ear. Step 3    1. Tilt yourself backward until you are lying on your back. Your head should still be at a 45-degree turn. Your head should be about midway between looking straight ahead and looking out to your side. Hold for 30 seconds. If you have vertigo, stay in this position until it stops. Step 4    1. Turn your head 90 degrees toward the ear that has the least vertigo. In this picture, the woman is turning to the right because she has vertigo on her left side. The point of your chin should be raised and over your shoulder. Hold for 30 seconds. Step 5    1. Roll onto the side with the least vertigo. You should now be looking at the floor. Hold for 30 seconds. Follow-up care is a key part of your treatment and safety. Be sure to make and go to all appointments, and call your doctor if you are having problems. It's also a good idea to know your test results and keep a list of the medicines you take. Where can you learn more? Go to https://johny.PrivacyStar. org and sign in to your CUPS account.  Enter G257 in the [employer] box to learn more about \"Epley Maneuver at Home for Vertigo: Exercises. \"     If you do not have an account, please click on the \"Sign Up Now\" link. Current as of: March 28, 2019  Content Version: 12.1  © 9590-1932 [employer], Incorporated. Care instructions adapted under license by [facility] ([facility]). If you have questions about a medical condition or this instruction, always ask your healthcare professional. [address] any warranty or liability for your use of this information.

## 2019-12-18 DIAGNOSIS — E78.2 MIXED HYPERLIPIDEMIA: ICD-10-CM

## 2019-12-18 RX ORDER — ROSUVASTATIN CALCIUM 10 MG/1
TABLET, COATED ORAL
Qty: 90 TABLET | Refills: 1 | Status: SHIPPED | OUTPATIENT
Start: 2019-12-18 | End: 2020-06-24

## 2020-02-26 ENCOUNTER — OFFICE VISIT (OUTPATIENT)
Dept: FAMILY MEDICINE CLINIC | Age: 60
End: 2020-02-26
Payer: COMMERCIAL

## 2020-02-26 VITALS
DIASTOLIC BLOOD PRESSURE: 72 MMHG | SYSTOLIC BLOOD PRESSURE: 108 MMHG | HEART RATE: 62 BPM | OXYGEN SATURATION: 91 % | WEIGHT: 175.8 LBS | BODY MASS INDEX: 25.17 KG/M2 | HEIGHT: 70 IN

## 2020-02-26 PROCEDURE — 99396 PREV VISIT EST AGE 40-64: CPT | Performed by: FAMILY MEDICINE

## 2020-02-26 RX ORDER — VITAMIN B COMPLEX
TABLET ORAL
COMMUNITY
End: 2021-02-04

## 2020-02-26 RX ORDER — UBIDECARENONE 75 MG
50 CAPSULE ORAL DAILY
COMMUNITY

## 2020-02-26 RX ORDER — MULTIVIT WITH MINERALS/LUTEIN
1000 TABLET ORAL DAILY
COMMUNITY
End: 2021-02-04

## 2020-02-26 ASSESSMENT — PATIENT HEALTH QUESTIONNAIRE - PHQ9
SUM OF ALL RESPONSES TO PHQ9 QUESTIONS 1 & 2: 0
1. LITTLE INTEREST OR PLEASURE IN DOING THINGS: 0
SUM OF ALL RESPONSES TO PHQ QUESTIONS 1-9: 0
SUM OF ALL RESPONSES TO PHQ QUESTIONS 1-9: 0
2. FEELING DOWN, DEPRESSED OR HOPELESS: 0

## 2020-03-01 ASSESSMENT — ENCOUNTER SYMPTOMS: RESPIRATORY NEGATIVE: 1

## 2020-03-11 DIAGNOSIS — E78.2 MIXED HYPERLIPIDEMIA: ICD-10-CM

## 2020-03-11 DIAGNOSIS — Z00.00 WELL ADULT EXAM: ICD-10-CM

## 2020-03-11 LAB
A/G RATIO: 1.7 (ref 1.1–2.2)
ALBUMIN SERPL-MCNC: 4.6 G/DL (ref 3.4–5)
ALP BLD-CCNC: 60 U/L (ref 40–129)
ALT SERPL-CCNC: 15 U/L (ref 10–40)
ANION GAP SERPL CALCULATED.3IONS-SCNC: 11 MMOL/L (ref 3–16)
AST SERPL-CCNC: 18 U/L (ref 15–37)
BILIRUB SERPL-MCNC: 0.7 MG/DL (ref 0–1)
BUN BLDV-MCNC: 12 MG/DL (ref 7–20)
CALCIUM SERPL-MCNC: 10.5 MG/DL (ref 8.3–10.6)
CHLORIDE BLD-SCNC: 98 MMOL/L (ref 99–110)
CHOLESTEROL, TOTAL: 165 MG/DL (ref 0–199)
CO2: 28 MMOL/L (ref 21–32)
CREAT SERPL-MCNC: 0.6 MG/DL (ref 0.6–1.1)
GFR AFRICAN AMERICAN: >60
GFR NON-AFRICAN AMERICAN: >60
GLOBULIN: 2.7 G/DL
GLUCOSE BLD-MCNC: 91 MG/DL (ref 70–99)
HDLC SERPL-MCNC: 41 MG/DL (ref 40–60)
LDL CHOLESTEROL CALCULATED: 100 MG/DL
POTASSIUM SERPL-SCNC: 4.7 MMOL/L (ref 3.5–5.1)
SODIUM BLD-SCNC: 137 MMOL/L (ref 136–145)
TOTAL PROTEIN: 7.3 G/DL (ref 6.4–8.2)
TRIGL SERPL-MCNC: 119 MG/DL (ref 0–150)
VLDLC SERPL CALC-MCNC: 24 MG/DL

## 2020-03-20 ENCOUNTER — OFFICE VISIT (OUTPATIENT)
Dept: ORTHOPEDIC SURGERY | Age: 60
End: 2020-03-20
Payer: COMMERCIAL

## 2020-03-20 VITALS — WEIGHT: 165 LBS | BODY MASS INDEX: 23.62 KG/M2 | HEIGHT: 70 IN

## 2020-03-20 PROCEDURE — 99243 OFF/OP CNSLTJ NEW/EST LOW 30: CPT | Performed by: ORTHOPAEDIC SURGERY

## 2020-03-20 NOTE — PROGRESS NOTES
SHOULDER CONSULTATION    Referring Provider: Dr. Alain Kussmaul    Primary Care Provider: Same    Chief Complaint    Dislocation (Left shoulder dislocations. 5 in the past 40 years. No pain unless she dislocates. Weakness)      History of Present Illness:  Shane Michaud is a 61 y.o. female who presents today for new patient evaluation and left shoulder consultation regarding a recent anterior shoulder instability event in the setting of chronic instability    Zachery Petersen is a very pleasant active female who enjoys light exercise and golf. She works with [employer] ([employer]). She is right-hand dominant. Her shoulder instability events began when she was 21years old diving for a ball playing volleyball. She had an abduction external rotation moment with an anterior dislocation. She did reasonably well for almost 20 years with no recurrent instability. However, over the last 17 years she is now had 5 additional instability events. They have all been somewhat random but associated with a weighted position abduction and external rotation. Recently, she had a dislocation then was manually reduced by her  with some a gentle traction. She said no axillary nerve symptoms. This occurred 2 weeks ago. She is now feeling quite well. She has nearly normal function of the shoulder at this time. She would like to get back to exercise and golf        Pain Assessment  Location of Pain: Shoulder  Location Modifiers: Left  Severity of Pain: 0    Medical History:  Patient's medications, allergies, past medical, surgical, social and family histories were reviewed and updated as appropriate. Review of Systems:  Pertinent items are noted in HPI  Review of systems reviewed from Patient History Form dated on March 20 and available in the patient's chart under the Media tab.      Vitals:    03/20/20 0730   Weight: 165 lb (74.8 kg)   Height: 5' 10\" (1.778 m)           General Exam:   Constitutional: Patient is adequately groomed with no evidence of malnutrition  Mental Status: The patient is oriented to time, place and person. The patient's mood and affect are appropriate. Vascular: Examination reveals no swelling or calf tenderness. Peripheral pulses are palpable and 2+. Neurological: The patient has good coordination. There is no weakness or sensory deficit. Shoulder Examination:    Inspection: On inspection today she demonstrates no focal muscular atrophy, she has reasonably good scapular posturing    Palpation: Some very modest tenderness along the anterior joint line, nonfocal    Range of Motion: She has nearly symmetric glenohumeral arc of movement with perhaps only a 10 degree loss of abducted external rotation    Strength: She has a very strong belly press and liftoff. Good external Tatian strength    Special Tests: She does have a audible and palpable click with an anterior inferior load shift and sulcus. She has a positive apprehension sign. She is negative posterior jerk and Sarah test.    Skin: There are no rashes, ulcerations or lesions. Gait: Stable with no assist device    Spine good cervical rotation    Additional Comments:         Radiology:     X-rays obtained and reviewed in the office today include 4 views of the left shoulder. She has concentric glenohumeral alignment, there may be some very subtle blunting of the anterior inferior glenoid rim minimal bone loss is appreciated however, minimal arthritic change      Assessment : Recurrent anterior glenohumeral instability in an active 51-year-old      Office Procedures:  Orders Placed This Encounter   Procedures    XR SHOULDER LEFT (MIN 2 VIEWS)     Standing Status:   Future     Number of Occurrences:   1     Standing Expiration Date:   3/20/2021       Treatment Plan: We had a lengthy visit today discussing the anatomy of the shoulder and the pathology of anterior instability.   We began by discussing the structures which confers stability to the shoulder; this office note is accurate.

## 2020-05-19 ENCOUNTER — HOSPITAL ENCOUNTER (OUTPATIENT)
Dept: MAMMOGRAPHY | Age: 60
Discharge: HOME | End: 2020-05-19
Payer: COMMERCIAL

## 2020-05-19 PROCEDURE — 77063 BREAST TOMOSYNTHESIS BI: CPT

## 2020-06-24 RX ORDER — ROSUVASTATIN CALCIUM 10 MG/1
TABLET, COATED ORAL
Qty: 90 TABLET | Refills: 0 | Status: SHIPPED | OUTPATIENT
Start: 2020-06-24 | End: 2020-09-21

## 2020-07-15 ENCOUNTER — OFFICE VISIT (OUTPATIENT)
Dept: PRIMARY CARE CLINIC | Age: 60
End: 2020-07-15
Payer: COMMERCIAL

## 2020-07-15 PROCEDURE — 99211 OFF/OP EST MAY X REQ PHY/QHP: CPT | Performed by: NURSE PRACTITIONER

## 2020-07-15 NOTE — PROGRESS NOTES
Aidee Salazar received a viral test for COVID-19. They were educated on isolation and quarantine as appropriate. For any symptoms, they were directed to seek care from their PCP, given contact information to establish with a doctor, directed to an urgent care or the emergency room.

## 2020-07-19 LAB
SARS-COV-2: NOT DETECTED
SOURCE: NORMAL

## 2020-07-20 ENCOUNTER — HOSPITAL ENCOUNTER (OUTPATIENT)
Dept: GENERAL RADIOLOGY | Age: 60
Discharge: HOME | End: 2020-07-20
Payer: COMMERCIAL

## 2020-07-20 PROCEDURE — 74220 X-RAY XM ESOPHAGUS 1CNTRST: CPT

## 2020-07-21 ENCOUNTER — OFFICE VISIT (OUTPATIENT)
Dept: DERMATOLOGY | Age: 60
End: 2020-07-21
Payer: COMMERCIAL

## 2020-07-21 VITALS — TEMPERATURE: 97.5 F

## 2020-07-21 PROCEDURE — 99213 OFFICE O/P EST LOW 20 MIN: CPT | Performed by: DERMATOLOGY

## 2020-07-21 PROCEDURE — 11102 TANGNTL BX SKIN SINGLE LES: CPT | Performed by: DERMATOLOGY

## 2020-07-21 NOTE — PATIENT INSTRUCTIONS

## 2020-07-21 NOTE — PROGRESS NOTES
[facility]  Jerri Kiran MD  [id number]  1960    61 y.o. female     Date of Visit: 7/21/2020    Chief Complaint: moles, lesions  Chief Complaint   Patient presents with    Skin Exam     Last seen: 1 year ago for FSE    History of Present Illness:    1. Here for evaluation of multiple asx pigmented lesions on the trunk and extremities, present for many years; no change in size/shape/color of any lesions; no bleeding lesions. 2. S/p excision of lipoma on the L upper back in 2018, present since at least [id number]. Healed well - large well-defined mass removed and path benign. Has subtle swelling remaining in this area but no discrete mass at this point. Asx.     3. She has a history of AK on the nose. No new concerns noted. 4. Intermittently has flares of cold sores. Had a flare on the L ala in 2017 - resolved but had a persistent erythematous area/scar from the flare. Has valtrex at home to use prn flares. No recent flares. 5. She has a concerning brown lesoion on the upper back. Asx. She was not wawere of it. Previously addressed:  Hx of intermittent breakouts of the chin and lower cheeks. No trx tried. Hx of HSV - has valtrex at home. No personal or family history of skin cancer. 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 Review of Systems:  Gen: Feels well, good sense of health. Skin: No changing moles or lesions. Past Medical History, Family History, Surgical History, Medications and Allergies reviewed.     Past Medical History:   Diagnosis Date    Allergic rhinitis     Chicken pox     Dyspareunia     HPV (human papilloma virus) anogenital infection     Hyperlipidemia     Recurrent cold sores     STD (sexually transmitted disease)     HPV       Past Surgical History:   Procedure Laterality Date    COLPOSCOPY      KNEE ARTHROSCOPY Left 07/26/2018    LEEP      SKIN TAG REMOVAL      TOOTH EXTRACTION         Outpatient sent to [facility]. F/u 1 year if benign.

## 2020-07-24 LAB — DERMATOLOGY PATHOLOGY REPORT: NORMAL

## 2020-08-24 ENCOUNTER — OFFICE VISIT (OUTPATIENT)
Dept: PRIMARY CARE CLINIC | Age: 60
End: 2020-08-24
Payer: COMMERCIAL

## 2020-08-24 PROCEDURE — 99211 OFF/OP EST MAY X REQ PHY/QHP: CPT | Performed by: NURSE PRACTITIONER

## 2020-08-25 LAB — SARS-COV-2, NAA: NOT DETECTED

## 2020-08-27 ENCOUNTER — ANESTHESIA EVENT (OUTPATIENT)
Dept: ENDOSCOPY | Age: 60
End: 2020-08-27
Payer: COMMERCIAL

## 2020-08-28 ENCOUNTER — HOSPITAL ENCOUNTER (OUTPATIENT)
Age: 60
Setting detail: OUTPATIENT SURGERY
Discharge: HOME | End: 2020-08-28
Attending: INTERNAL MEDICINE | Admitting: INTERNAL MEDICINE
Payer: COMMERCIAL

## 2020-08-28 ENCOUNTER — ANESTHESIA (OUTPATIENT)
Dept: ENDOSCOPY | Age: 60
End: 2020-08-28
Payer: COMMERCIAL

## 2020-08-28 VITALS
SYSTOLIC BLOOD PRESSURE: 108 MMHG | HEART RATE: 60 BPM | HEIGHT: 69 IN | TEMPERATURE: 96.6 F | RESPIRATION RATE: 18 BRPM | DIASTOLIC BLOOD PRESSURE: 70 MMHG | BODY MASS INDEX: 24.44 KG/M2 | WEIGHT: 165 LBS | OXYGEN SATURATION: 99 %

## 2020-08-28 VITALS — DIASTOLIC BLOOD PRESSURE: 68 MMHG | OXYGEN SATURATION: 97 % | SYSTOLIC BLOOD PRESSURE: 99 MMHG

## 2020-08-28 PROCEDURE — 6360000002 HC RX W HCPCS: Performed by: NURSE ANESTHETIST, CERTIFIED REGISTERED

## 2020-08-28 PROCEDURE — 2580000003 HC RX 258: Performed by: ANESTHESIOLOGY

## 2020-08-28 PROCEDURE — 3700000001 HC ADD 15 MINUTES (ANESTHESIA): Performed by: INTERNAL MEDICINE

## 2020-08-28 PROCEDURE — 7100000010 HC PHASE II RECOVERY - FIRST 15 MIN: Performed by: INTERNAL MEDICINE

## 2020-08-28 PROCEDURE — 88305 TISSUE EXAM BY PATHOLOGIST: CPT

## 2020-08-28 PROCEDURE — 3700000000 HC ANESTHESIA ATTENDED CARE: Performed by: INTERNAL MEDICINE

## 2020-08-28 PROCEDURE — 7100000011 HC PHASE II RECOVERY - ADDTL 15 MIN: Performed by: INTERNAL MEDICINE

## 2020-08-28 PROCEDURE — 3609010300 HC COLONOSCOPY W/BIOPSY SINGLE/MULTIPLE: Performed by: INTERNAL MEDICINE

## 2020-08-28 PROCEDURE — 2500000003 HC RX 250 WO HCPCS: Performed by: NURSE ANESTHETIST, CERTIFIED REGISTERED

## 2020-08-28 PROCEDURE — 2709999900 HC NON-CHARGEABLE SUPPLY: Performed by: INTERNAL MEDICINE

## 2020-08-28 PROCEDURE — 3609012700 HC EGD DILATION SAVORY: Performed by: INTERNAL MEDICINE

## 2020-08-28 PROCEDURE — 3609012400 HC EGD TRANSORAL BIOPSY SINGLE/MULTIPLE: Performed by: INTERNAL MEDICINE

## 2020-08-28 RX ORDER — SODIUM CHLORIDE, SODIUM LACTATE, POTASSIUM CHLORIDE, CALCIUM CHLORIDE 600; 310; 30; 20 MG/100ML; MG/100ML; MG/100ML; MG/100ML
INJECTION, SOLUTION INTRAVENOUS CONTINUOUS
Status: DISCONTINUED | OUTPATIENT
Start: 2020-08-28 | End: 2020-08-28 | Stop reason: HOSPADM

## 2020-08-28 RX ORDER — PROPOFOL 10 MG/ML
INJECTION, EMULSION INTRAVENOUS PRN
Status: DISCONTINUED | OUTPATIENT
Start: 2020-08-28 | End: 2020-08-28 | Stop reason: SDUPTHER

## 2020-08-28 RX ORDER — LIDOCAINE HYDROCHLORIDE 20 MG/ML
INJECTION, SOLUTION INFILTRATION; PERINEURAL PRN
Status: DISCONTINUED | OUTPATIENT
Start: 2020-08-28 | End: 2020-08-28 | Stop reason: SDUPTHER

## 2020-08-28 RX ADMIN — PROPOFOL 25 MG: 10 INJECTION, EMULSION INTRAVENOUS at 09:28

## 2020-08-28 RX ADMIN — PROPOFOL 25 MG: 10 INJECTION, EMULSION INTRAVENOUS at 09:22

## 2020-08-28 RX ADMIN — PROPOFOL 50 MG: 10 INJECTION, EMULSION INTRAVENOUS at 09:10

## 2020-08-28 RX ADMIN — PROPOFOL 50 MG: 10 INJECTION, EMULSION INTRAVENOUS at 09:03

## 2020-08-28 RX ADMIN — PROPOFOL 25 MG: 10 INJECTION, EMULSION INTRAVENOUS at 09:37

## 2020-08-28 RX ADMIN — PROPOFOL 25 MG: 10 INJECTION, EMULSION INTRAVENOUS at 09:41

## 2020-08-28 RX ADMIN — PROPOFOL 50 MG: 10 INJECTION, EMULSION INTRAVENOUS at 09:08

## 2020-08-28 RX ADMIN — PROPOFOL 25 MG: 10 INJECTION, EMULSION INTRAVENOUS at 09:17

## 2020-08-28 RX ADMIN — PROPOFOL 50 MG: 10 INJECTION, EMULSION INTRAVENOUS at 09:12

## 2020-08-28 RX ADMIN — PROPOFOL 25 MG: 10 INJECTION, EMULSION INTRAVENOUS at 09:15

## 2020-08-28 RX ADMIN — PROPOFOL 50 MG: 10 INJECTION, EMULSION INTRAVENOUS at 09:06

## 2020-08-28 RX ADMIN — SODIUM CHLORIDE, SODIUM LACTATE, POTASSIUM CHLORIDE, AND CALCIUM CHLORIDE: 600; 310; 30; 20 INJECTION, SOLUTION INTRAVENOUS at 08:48

## 2020-08-28 RX ADMIN — PROPOFOL 25 MG: 10 INJECTION, EMULSION INTRAVENOUS at 09:25

## 2020-08-28 RX ADMIN — PROPOFOL 25 MG: 10 INJECTION, EMULSION INTRAVENOUS at 09:34

## 2020-08-28 RX ADMIN — PROPOFOL 80 MG: 10 INJECTION, EMULSION INTRAVENOUS at 09:00

## 2020-08-28 RX ADMIN — PROPOFOL 25 MG: 10 INJECTION, EMULSION INTRAVENOUS at 09:31

## 2020-08-28 RX ADMIN — LIDOCAINE HYDROCHLORIDE 50 MG: 20 INJECTION, SOLUTION INFILTRATION; PERINEURAL at 08:59

## 2020-08-28 ASSESSMENT — PULMONARY FUNCTION TESTS
PIF_VALUE: 0
PIF_VALUE: 1
PIF_VALUE: 0
PIF_VALUE: 1
PIF_VALUE: 0

## 2020-08-28 ASSESSMENT — PAIN - FUNCTIONAL ASSESSMENT: PAIN_FUNCTIONAL_ASSESSMENT: 0-10

## 2020-08-28 NOTE — H&P
[address] [place] [address]   Pre-operative History and Physical    Patient: Damion León  : 1960     History Obtained From:  patient    HISTORY OF PRESENT ILLNESS:    The patient is a 61 y.o. female who presents for an EGD and colonoscopy for dysphagia  Hx polyps  . Past Medical History:        Diagnosis Date    Allergic rhinitis     Chicken pox     Dyspareunia     HPV (human papilloma virus) anogenital infection     Hyperlipidemia     PONV (postoperative nausea and vomiting)     Recurrent cold sores     STD (sexually transmitted disease)     HPV     Past Surgical History:        Procedure Laterality Date    COLPOSCOPY      KNEE ARTHROSCOPY Left 2018    LEEP      SKIN TAG REMOVAL      TOOTH EXTRACTION       Medications Prior to Admission:   No current facility-administered medications on file prior to encounter. Current Outpatient Medications on File Prior to Encounter   Medication Sig Dispense Refill    Turmeric (QC TUMERIC COMPLEX PO) Take 1 capsule by mouth      rosuvastatin (CRESTOR) 10 MG tablet TAKE 1 TABLET BY MOUTH ONE TIME A DAY 90 tablet 0    NONFORMULARY Elderberry syrup      vitamin B-12 (CYANOCOBALAMIN) 100 MCG tablet Take 50 mcg by mouth daily      Probiotic Product (PROBIOTIC-10 ULTIMATE PO) Take by mouth      Vitamin D (CHOLECALCIFEROL) 1000 UNITS CAPS capsule Take 1,000 Units by mouth daily.       Coenzyme Q10 (COQ10) 100 MG CAPS Take by mouth      vitamin E 1000 units capsule Take 1,000 Units by mouth daily      valACYclovir (VALTREX) 1 g tablet TAKE 2 TABLETS BY MOUTH TWO TIMES A DAY FOR 2 DOSES, AS NEEDED 36 tablet 1     Allergies:  Penicillins and Sulfa antibiotics    History of allergic reaction to anesthesia:  No      PHYSICAL EXAM:      BP (!) 117/38   Pulse 58   Temp 98.5 °F (36.9 °C) (Oral)   Resp 16   Ht 5' 9\" (1.753 m)   Wt 165 lb (74.8 kg)   LMP 2012   SpO2 97%   BMI 24.37 kg/m²  I        Heart:  No m/r/g +s1/s2 RRR    Lungs: CTA bilaterally    Abdomen:  Soft, nontender, non distended; +bs    ASA Grade:  ASA 1 - Normal health patient    Mallampati Class: 3      ASSESSMENT AND PLAN:    1. Patient is a 61 y.o. female here for an EGD and colonoscopy with conscious sedation  2. Procedure options, risks and benefits reviewed with patient. We specifically discussed that risks include, but are not limited to infection, bleeding, perforation, death, and missed lesions. Patient expresses understanding.

## 2020-08-28 NOTE — ANESTHESIA POSTPROCEDURE EVALUATION
Department of Anesthesiology  Postprocedure Note    Patient: Neeraj Gregorio  MRN: [id number]  YOB: 1960  Date of evaluation: 8/28/2020  Time:  10:24 AM     Procedure Summary     Date:  08/28/20 Room / Location:  [address] [place] [address] Nata Patel [address] [place] [address]    Anesthesia Start:  3806 Anesthesia Stop:  Elijah Zimmer    Procedures:       EGD BIOPSY (N/A )      EGD DILATION GASTON (N/A )      COLONOSCOPY WITH BIOPSY Diagnosis:       Dysphagia, unspecified type      Hyperplastic colonic polyp, unspecified part of colon      (Dysphagia, unspecified type [R13.10] Hyperplastic colonic polyp, unspecified part of colon [K63.5])    Surgeon:  Israel Balderas MD Responsible Provider:  Kailash Bird MD    Anesthesia Type:  MAC ASA Status:  1          Anesthesia Type: MAC    Pepper Phase I: Pepper Score: 10    Pepper Phase II: Pepper Score: 10    Last vitals: Reviewed and per EMR flowsheets.        Anesthesia Post Evaluation    Patient location during evaluation: bedside  Patient participation: complete - patient participated  Level of consciousness: awake  Airway patency: patent  Nausea & Vomiting: no nausea and no vomiting  Complications: no  Cardiovascular status: hemodynamically stable  Respiratory status: acceptable  Hydration status: stable

## 2020-08-28 NOTE — ANESTHESIA PRE PROCEDURE
Department of Anesthesiology  Preprocedure Note       Name:  Jovani Pichardo   Age:  61 y.o.  :  1960                                          MRN:  [id number]         Date:  2020      Surgeon: Demetra Hodges):  Quita Blakely MD    Procedure: Procedure(s):  ESOPHAGOGASTRODUODENOSCOPY/  COLONOSCOPY    Medications prior to admission:   Prior to Admission medications    Medication Sig Start Date End Date Taking? Authorizing Provider   rosuvastatin (CRESTOR) 10 MG tablet TAKE 1 TABLET BY MOUTH ONE TIME A DAY 20   Asia Washington MD   NONFORMULARY Elderberry syrup    Historical Provider, MD   vitamin B-12 (CYANOCOBALAMIN) 100 MCG tablet Take 50 mcg by mouth daily    Historical Provider, MD   Coenzyme Q10 (COQ10) 100 MG CAPS Take by mouth    Historical Provider, MD   vitamin E 1000 units capsule Take 1,000 Units by mouth daily    Historical Provider, MD   valACYclovir (VALTREX) 1 g tablet TAKE 2 TABLETS BY MOUTH TWO TIMES A DAY FOR 2 DOSES, AS NEEDED 5/15/19   Asia Washington MD   Probiotic Product (PROBIOTIC-10 ULTIMATE PO) Take by mouth    Historical Provider, MD   Vitamin D (CHOLECALCIFEROL) 1000 UNITS CAPS capsule Take 1,000 Units by mouth daily. 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    Mixed hyperlipidemia E78.2    Chronic seasonal allergic rhinitis due to pollen J30.1       Past Medical History:        Diagnosis Date    Allergic rhinitis     Chicken pox     Dyspareunia     HPV (human papilloma virus) anogenital infection     Hyperlipidemia     Recurrent cold sores     STD (sexually transmitted disease)     HPV       Past Surgical History:        Procedure Laterality Date    COLPOSCOPY      KNEE ARTHROSCOPY Left 07/26/2018    LEEP      SKIN TAG REMOVAL      TOOTH EXTRACTION         Social History:    Social History     Tobacco Use    Smoking status: Never Smoker    Smokeless tobacco: Never Used   Substance Use Topics    Alcohol use: Yes     Alcohol/week: 8.0 standard drinks     Types: 4 Cans of beer, 4 Standard drinks or equivalent per week     Comment: occasionally                                Counseling given: Not Answered      Vital Signs (Current):   Vitals:    08/28/20 0826   BP: (!) 117/38   Pulse: 58   Resp: 16   Temp: 98.5 °F (36.9 °C)   TempSrc: Oral   SpO2: 97%   Weight: 165 lb (74.8 kg)   Height: 5' 9\" (1.753 m)                                              BP Readings from Last 3 Encounters:   08/28/20 (!) 117/38   02/26/20 108/72   09/16/19 118/65       NPO Status:                                                                                 BMI:   Wt Readings from Last 3 Encounters:   08/28/20 165 lb (74.8 kg)   03/20/20 165 lb (74.8 kg)   02/26/20 175 lb 12.8 oz (79.7 kg)     Body mass index is 24.37 kg/m².     CBC:   Lab Results   Component Value Date    WBC 5.0 05/14/2018    RBC 4.50 05/14/2018    HGB 14.0 05/14/2018    HCT 41.3 05/14/2018    MCV 91.8 05/14/2018    RDW 12.8 05/14/2018     05/14/2018       CMP:   Lab Results   Component Value Date     03/11/2020    K 4.7 03/11/2020    CL 98 03/11/2020    CO2 28 03/11/2020    BUN 12 03/11/2020    CREATININE 0.6 03/11/2020    GFRAA >60 03/11/2020    GFRAA >60 08/02/2012    AGRATIO 1.7 03/11/2020    LABGLOM >60 03/11/2020    GLUCOSE 91 03/11/2020    PROT 7.3 03/11/2020    PROT 7.1 08/02/2012    CALCIUM 10.5 03/11/2020    BILITOT 0.7 03/11/2020    ALKPHOS 60 03/11/2020    AST 18 03/11/2020    ALT 15 03/11/2020       POC Tests: No results for input(s): POCGLU, POCNA, POCK, POCCL, POCBUN, POCHEMO, POCHCT in the last 72 hours. Coags: No results found for: PROTIME, INR, APTT    HCG (If Applicable): No results found for: PREGTESTUR, PREGSERUM, HCG, HCGQUANT     ABGs: No results found for: PHART, PO2ART, EYJ9FJF, HUQ8KJR, BEART, E4NXZQKM     Type & Screen (If Applicable):  No results found for: LABABO, LABRH    Drug/Infectious Status (If Applicable):  No results found for: HIV, HEPCAB    COVID-19 Screening (If Applicable):   Lab Results   Component Value Date    COVID19 NOT DETECTED 08/24/2020         Anesthesia Evaluation  Patient summary reviewed and Nursing notes reviewed no history of anesthetic complications:   Airway: Mallampati: III  TM distance: >3 FB   Neck ROM: full  Mouth opening: > = 3 FB Dental: normal exam         Pulmonary:Negative Pulmonary ROS                              Cardiovascular:Negative CV ROS                      Neuro/Psych:   Negative Neuro/Psych ROS              GI/Hepatic/Renal: Neg GI/Hepatic/Renal ROS            Endo/Other: Negative Endo/Other ROS                    Abdominal:           Vascular: negative vascular ROS. Anesthesia Plan      MAC     ASA 1       Induction: intravenous. Anesthetic plan and risks discussed with patient.                       Italo Weinberg MD   8/28/2020

## 2020-08-28 NOTE — PROGRESS NOTES
Ambulatory Surgery/Procedure Discharge Note    Vitals:    08/28/20 1014   BP: 108/70   Pulse: 60   Resp: 18   Temp:    SpO2: 99%     Patient meets discharge criteria based on Pepper score. In: 922 [P.O.:222; I.V.:700]  Out: -     Restroom use offered before discharge. Yes    Pain assessment:  none  Pain Level: 0    Discharge instructions reviewed with patients , who took prescription to The [address] [place] [address] to be filled before leaving. Patient is alert and oriented x 4. Patient spoke with Dr. Gin Lindsey prior to discharge. Patient discharged to home/self care.  Patient discharged via wheel chair by transporter to waiting family/S.O.       8/28/2020 10:57 AM

## 2020-09-21 RX ORDER — ROSUVASTATIN CALCIUM 10 MG/1
TABLET, COATED ORAL
Qty: 90 TABLET | Refills: 0 | Status: SHIPPED | OUTPATIENT
Start: 2020-09-21 | End: 2020-12-21

## 2020-11-06 RX ORDER — VALACYCLOVIR HYDROCHLORIDE 1 G/1
TABLET, FILM COATED ORAL
Qty: 36 TABLET | Refills: 0 | Status: SHIPPED | OUTPATIENT
Start: 2020-11-06 | End: 2021-11-08

## 2020-11-30 ENCOUNTER — OFFICE VISIT (OUTPATIENT)
Dept: PRIMARY CARE CLINIC | Age: 60
End: 2020-11-30
Payer: COMMERCIAL

## 2020-11-30 PROCEDURE — 99211 OFF/OP EST MAY X REQ PHY/QHP: CPT | Performed by: NURSE PRACTITIONER

## 2020-12-01 LAB — SARS-COV-2: NOT DETECTED

## 2020-12-03 ENCOUNTER — ANESTHESIA EVENT (OUTPATIENT)
Dept: ENDOSCOPY | Age: 60
End: 2020-12-03
Payer: COMMERCIAL

## 2020-12-04 ENCOUNTER — HOSPITAL ENCOUNTER (OUTPATIENT)
Age: 60
Setting detail: OUTPATIENT SURGERY
Discharge: HOME | End: 2020-12-04
Attending: INTERNAL MEDICINE | Admitting: INTERNAL MEDICINE
Payer: COMMERCIAL

## 2020-12-04 ENCOUNTER — ANESTHESIA (OUTPATIENT)
Dept: ENDOSCOPY | Age: 60
End: 2020-12-04
Payer: COMMERCIAL

## 2020-12-04 VITALS — DIASTOLIC BLOOD PRESSURE: 61 MMHG | OXYGEN SATURATION: 98 % | SYSTOLIC BLOOD PRESSURE: 115 MMHG

## 2020-12-04 VITALS
HEIGHT: 70 IN | BODY MASS INDEX: 24.05 KG/M2 | TEMPERATURE: 97 F | DIASTOLIC BLOOD PRESSURE: 76 MMHG | RESPIRATION RATE: 18 BRPM | WEIGHT: 168 LBS | SYSTOLIC BLOOD PRESSURE: 103 MMHG | OXYGEN SATURATION: 98 % | HEART RATE: 54 BPM

## 2020-12-04 PROCEDURE — 2709999900 HC NON-CHARGEABLE SUPPLY: Performed by: INTERNAL MEDICINE

## 2020-12-04 PROCEDURE — 7100000010 HC PHASE II RECOVERY - FIRST 15 MIN: Performed by: INTERNAL MEDICINE

## 2020-12-04 PROCEDURE — 6360000002 HC RX W HCPCS: Performed by: NURSE ANESTHETIST, CERTIFIED REGISTERED

## 2020-12-04 PROCEDURE — 3609017100 HC EGD: Performed by: INTERNAL MEDICINE

## 2020-12-04 PROCEDURE — 7100000011 HC PHASE II RECOVERY - ADDTL 15 MIN: Performed by: INTERNAL MEDICINE

## 2020-12-04 PROCEDURE — 2580000003 HC RX 258: Performed by: ANESTHESIOLOGY

## 2020-12-04 PROCEDURE — 3700000000 HC ANESTHESIA ATTENDED CARE: Performed by: INTERNAL MEDICINE

## 2020-12-04 RX ORDER — OMEPRAZOLE 40 MG/1
20 CAPSULE, DELAYED RELEASE ORAL
COMMUNITY
Start: 2020-09-21 | End: 2021-11-10

## 2020-12-04 RX ORDER — LIDOCAINE HYDROCHLORIDE 20 MG/ML
INJECTION, SOLUTION INTRAVENOUS PRN
Status: DISCONTINUED | OUTPATIENT
Start: 2020-12-04 | End: 2020-12-04 | Stop reason: SDUPTHER

## 2020-12-04 RX ORDER — SODIUM CHLORIDE, SODIUM LACTATE, POTASSIUM CHLORIDE, CALCIUM CHLORIDE 600; 310; 30; 20 MG/100ML; MG/100ML; MG/100ML; MG/100ML
INJECTION, SOLUTION INTRAVENOUS CONTINUOUS
Status: DISCONTINUED | OUTPATIENT
Start: 2020-12-04 | End: 2020-12-04 | Stop reason: HOSPADM

## 2020-12-04 RX ORDER — PROPOFOL 10 MG/ML
INJECTION, EMULSION INTRAVENOUS PRN
Status: DISCONTINUED | OUTPATIENT
Start: 2020-12-04 | End: 2020-12-04 | Stop reason: SDUPTHER

## 2020-12-04 RX ADMIN — SODIUM CHLORIDE, POTASSIUM CHLORIDE, SODIUM LACTATE AND CALCIUM CHLORIDE: 600; 310; 30; 20 INJECTION, SOLUTION INTRAVENOUS at 07:15

## 2020-12-04 RX ADMIN — LIDOCAINE HYDROCHLORIDE 100 MG: 20 INJECTION, SOLUTION INTRAVENOUS at 07:33

## 2020-12-04 RX ADMIN — PROPOFOL 20 MG: 10 INJECTION, EMULSION INTRAVENOUS at 07:38

## 2020-12-04 RX ADMIN — PROPOFOL 10 MG: 10 INJECTION, EMULSION INTRAVENOUS at 07:40

## 2020-12-04 RX ADMIN — PROPOFOL 40 MG: 10 INJECTION, EMULSION INTRAVENOUS at 07:33

## 2020-12-04 RX ADMIN — PROPOFOL 20 MG: 10 INJECTION, EMULSION INTRAVENOUS at 07:36

## 2020-12-04 ASSESSMENT — PULMONARY FUNCTION TESTS
PIF_VALUE: 0
PIF_VALUE: 1
PIF_VALUE: 0
PIF_VALUE: 1
PIF_VALUE: 0

## 2020-12-04 ASSESSMENT — PAIN - FUNCTIONAL ASSESSMENT: PAIN_FUNCTIONAL_ASSESSMENT: 0-10

## 2020-12-04 ASSESSMENT — LIFESTYLE VARIABLES: SMOKING_STATUS: 0

## 2020-12-04 NOTE — H&P
[facility]    Pre-operative History and Physical    Patient: Reny King  : 1960  CSN:     History Obtained From:   Patient or guardian. HISTORY OF PRESENT ILLNESS:    The patient is a 61 y.o. female here for EGD for reassess severe esophagitis and stricture. Taking PPI and sx's improved. No further dysphagia. Past Medical History:    Past Medical History:   Diagnosis Date    Allergic rhinitis     Chicken pox     Chondromalacia patellae, left knee     Dyspareunia     HPV (human papilloma virus) anogenital infection     Hyperlipidemia     PONV (postoperative nausea and vomiting)     Recurrent cold sores     STD (sexually transmitted disease)     HPV     Past Surgical History:    Past Surgical History:   Procedure Laterality Date    COLONOSCOPY  2020    COLONOSCOPY WITH BIOPSY performed by Rayshawn Oscar MD at [address] [place] [address] ARTHROSCOPY Left 2018    LEEP      SKIN TAG REMOVAL      TOOTH EXTRACTION      UPPER GASTROINTESTINAL ENDOSCOPY N/A 2020    EGD BIOPSY performed by Rayshawn Oscar MD at [address] [place] [address] N/A 2020    EGD Artist Muscat performed by Rayshawn Oscar MD at [facility] ENDOSCOPY     Medications Prior to Admission:   No current facility-administered medications on file prior to encounter.       Current Outpatient Medications on File Prior to Encounter   Medication Sig Dispense Refill    omeprazole (PRILOSEC) 40 MG delayed release capsule       rosuvastatin (CRESTOR) 10 MG tablet TAKE 1 TABLET BY MOUTH ONE TIME A DAY 90 tablet 0    Turmeric (QC TUMERIC COMPLEX PO) Take 1 capsule by mouth      NONFORMULARY Elderberry syrup      vitamin B-12 (CYANOCOBALAMIN) 100 MCG tablet Take 50 mcg by mouth daily      Coenzyme Q10 (COQ10) 100 MG CAPS Take by mouth      vitamin E 1000 units capsule Take 1,000 Units by mouth daily      Probiotic Product (PROBIOTIC-10 ULTIMATE PO) Take by mouth      Vitamin D (CHOLECALCIFEROL) 1000 UNITS CAPS capsule Take 1,000 Units by mouth daily. Allergies:  Penicillins and Sulfa antibiotics      Social History:   Social History     Tobacco Use    Smoking status: Never Smoker    Smokeless tobacco: Never Used   Substance Use Topics    Alcohol use: Yes     Alcohol/week: 8.0 standard drinks     Types: 4 Cans of beer, 4 Standard drinks or equivalent per week     Comment: occasionally     Family History:   Family History   Problem Relation Age of Onset    High Blood Pressure Mother     High Cholesterol Mother     Depression Mother     Arthritis Father     Heart Disease Father     High Blood Pressure Father     High Cholesterol Father     Kidney Disease Father     Cancer Father         testicle    Heart Disease Maternal Grandfather     Heart Disease Paternal Grandfather     Heart Disease Brother         MI at 48    High Cholesterol Brother     High Cholesterol Sister     Rheum Arthritis Neg Hx     Osteoarthritis Neg Hx     Asthma Neg Hx     Breast Cancer Neg Hx     Diabetes Neg Hx     Heart Failure Neg Hx     Hypertension Neg Hx     Migraines Neg Hx     Ovarian Cancer Neg Hx     Rashes/Skin Problems Neg Hx     Seizures Neg Hx     Stroke Neg Hx     Thyroid Disease Neg Hx        PHYSICAL EXAM:      /80   Pulse 70   Temp 97.2 °F (36.2 °C) (Temporal)   Resp 17   Ht 5' 10\" (1.778 m)   Wt 168 lb (76.2 kg)   LMP 07/28/2012   SpO2 100%   BMI 24.11 kg/m²  I        Heart:  RRR, normal s1s2    Lungs:  CTA and normal effort    Abdomen:   Soft, nt nd. ASSESSMENT AND PLAN:    1. Patient is a 61 y.o. female here for endoscopy with MAC sedation. 2.  Procedure options, risks and benefits reviewed with patient and/or guardian. They express understanding.     Asif Leija MD  [facility] and [address] [place] [address]

## 2020-12-04 NOTE — ANESTHESIA PRE PROCEDURE
Department of Anesthesiology  Preprocedure Note       Name:  Guille Alfonso   Age:  61 y.o.  :  1960                                          MRN:  [id number]         Date:  2020      Surgeon: Eduardo Cooper):  Abimael Almeida MD    Procedure: Procedure(s):  ESOPHAGOGASTRODUODENOSCOPY    Medications prior to admission:   Prior to Admission medications    Medication Sig Start Date End Date Taking? Authorizing Provider   omeprazole (PRILOSEC) 40 MG delayed release capsule  20  Yes Historical Provider, MD   rosuvastatin (CRESTOR) 10 MG tablet TAKE 1 TABLET BY MOUTH ONE TIME A DAY 20  Yes Gretta Mae MD   Turmeric (QC TUMERIC COMPLEX PO) Take 1 capsule by mouth   Yes Historical Provider, MD   NONFORMULARY Elderberry syrup   Yes Historical Provider, MD   vitamin B-12 (CYANOCOBALAMIN) 100 MCG tablet Take 50 mcg by mouth daily   Yes Historical Provider, MD   Coenzyme Q10 (COQ10) 100 MG CAPS Take by mouth   Yes Historical Provider, MD   vitamin E 1000 units capsule Take 1,000 Units by mouth daily   Yes Historical Provider, MD   Probiotic Product (PROBIOTIC-10 ULTIMATE PO) Take by mouth   Yes Historical Provider, MD   Vitamin D (CHOLECALCIFEROL) 1000 UNITS CAPS capsule Take 1,000 Units by mouth daily. Yes Historical Provider, MD   valACYclovir (VALTREX) 1 g tablet TAKE TWO TABLETS BY MOUTH TWICE A DAY FOR 2 DOSES AS NEEDED 20   Gretta Mae MD       Current medications:    Current Facility-Administered Medications   Medication Dose Route Frequency Provider Last Rate Last Dose    lactated ringers infusion   Intravenous Continuous Guerda Taylor  mL/hr at 20 0715         Allergies:     Allergies   Allergen Reactions    Penicillins Hives    Sulfa Antibiotics Hives       Problem List:    Patient Active Problem List   Diagnosis Code    Vitamin D deficiency E55.9    Recurrent cold sores B00.1    Atrophic vaginitis N95.2    Acne vulgaris L70.0    Baker cyst M71.20    Chondromalacia patellae, 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    Mixed hyperlipidemia E78.2    Chronic seasonal allergic rhinitis due to pollen J30.1       Past Medical History:        Diagnosis Date    Allergic rhinitis     Chicken pox     Chondromalacia patellae, left knee     Dyspareunia     HPV (human papilloma virus) anogenital infection     Hyperlipidemia     PONV (postoperative nausea and vomiting)     Recurrent cold sores     STD (sexually transmitted disease)     HPV       Past Surgical History:        Procedure Laterality Date    COLONOSCOPY  8/28/2020    COLONOSCOPY WITH BIOPSY performed by Baltazar Gu MD at [address] [place] [address] ARTHROSCOPY Left 07/26/2018    LEEP      SKIN TAG REMOVAL      TOOTH EXTRACTION      UPPER GASTROINTESTINAL ENDOSCOPY N/A 8/28/2020    EGD BIOPSY performed by Baltazar Gu MD at [facility] N/A 8/28/2020    EGD Rheta Merry performed by Baltazar Gu MD at [address] History:    Social History     Tobacco Use    Smoking status: Never Smoker    Smokeless tobacco: Never Used   Substance Use Topics    Alcohol use:  Yes     Alcohol/week: 8.0 standard drinks     Types: 4 Cans of beer, 4 Standard drinks or equivalent per week     Comment: occasionally                                Counseling given: Not Answered      Vital Signs (Current):   Vitals:    12/04/20 0630   BP: 128/80   Pulse: 70   Resp: 17   Temp: 97.2 °F (36.2 °C)   TempSrc: Temporal   SpO2: 100%   Weight: 168 lb (76.2 kg)   Height: 5' 10\" (1.778 m)                                              BP Readings from Last 3 Encounters:   12/04/20 128/80   08/28/20 99/68   08/28/20 108/70       NPO Status: Time of last liquid consumption: 2130                        Time of last solid consumption: 1700                        Date of last liquid consumption: 12/03/20                        Date of last solid food consumption: 12/03/20    BMI: Wt Readings from Last 3 Encounters:   12/04/20 168 lb (76.2 kg)   08/28/20 165 lb (74.8 kg)   03/20/20 165 lb (74.8 kg)     Body mass index is 24.11 kg/m². CBC:   Lab Results   Component Value Date    WBC 5.0 05/14/2018    RBC 4.50 05/14/2018    HGB 14.0 05/14/2018    HCT 41.3 05/14/2018    MCV 91.8 05/14/2018    RDW 12.8 05/14/2018     05/14/2018       CMP:   Lab Results   Component Value Date     03/11/2020    K 4.7 03/11/2020    CL 98 03/11/2020    CO2 28 03/11/2020    BUN 12 03/11/2020    CREATININE 0.6 03/11/2020    GFRAA >60 03/11/2020    GFRAA >60 08/02/2012    AGRATIO 1.7 03/11/2020    LABGLOM >60 03/11/2020    GLUCOSE 91 03/11/2020    PROT 7.3 03/11/2020    PROT 7.1 08/02/2012    CALCIUM 10.5 03/11/2020    BILITOT 0.7 03/11/2020    ALKPHOS 60 03/11/2020    AST 18 03/11/2020    ALT 15 03/11/2020       POC Tests: No results for input(s): POCGLU, POCNA, POCK, POCCL, POCBUN, POCHEMO, POCHCT in the last 72 hours. Coags: No results found for: PROTIME, INR, APTT    HCG (If Applicable): No results found for: PREGTESTUR, PREGSERUM, HCG, HCGQUANT     ABGs: No results found for: PHART, PO2ART, OUC8STH, ZEG8ZWQ, BEART, D6RKHUMT     Type & Screen (If Applicable):  No results found for: LABABO, LABRH    Drug/Infectious Status (If Applicable):  No results found for: HIV, HEPCAB    COVID-19 Screening (If Applicable):   Lab Results   Component Value Date    COVID19 Not Detected 11/30/2020    COVID19 NOT DETECTED 08/24/2020         Anesthesia Evaluation  Patient summary reviewed and Nursing notes reviewed   history of anesthetic complications: PONV.   Airway: Mallampati: I  TM distance: >3 FB   Neck ROM: full  Mouth opening: > = 3 FB Dental: normal exam         Pulmonary: breath sounds clear to auscultation      (-) not a current smoker (never)                           Cardiovascular:Negative CV ROS  Exercise tolerance: good (>4 METS),       (-) past MI    NYHA Classification: I    Rhythm: regular  Rate: normal           Beta Blocker:  Not on Beta Blocker         Neuro/Psych:               GI/Hepatic/Renal:        (-) GERD      ROS comment: Dysphagia   Last June tried egd/dilation  Too swollen . Endo/Other: Negative Endo/Other ROS                    Abdominal:           Vascular:                                        Anesthesia Plan      MAC     ASA 1       Induction: intravenous. MIPS: Prophylactic antiemetics administered. Anesthetic plan and risks discussed with patient. Plan discussed with CRNA.     Attending anesthesiologist reviewed and agrees with Pre Eval content              Price Garcia DO   12/4/2020

## 2020-12-04 NOTE — PROGRESS NOTES
Ambulatory Surgery/Procedure Discharge Note    Vitals:    12/04/20 0804   BP: 103/76   Pulse: 54   Resp: 18   Temp:    SpO2: 98%       In: 275 [P.O.:75; I.V.:200]  Out: -     Restroom use offered before discharge. Yes    Pain assessment:  none  Pain Level: 0        Patient discharged to home/self care.  Patient discharged via wheel chair by transporter to waiting family/S.O.       12/4/2020 8:37 AM

## 2020-12-04 NOTE — PROGRESS NOTES
Patient here for EGD with Dr. Evelia Headley. Patient did not quarantined after covid testing. IV infusing in right hand 20 Gauge LR KVO. All needs met. Call light within reach.

## 2020-12-21 RX ORDER — ROSUVASTATIN CALCIUM 10 MG/1
TABLET, COATED ORAL
Qty: 90 TABLET | Refills: 0 | Status: SHIPPED | OUTPATIENT
Start: 2020-12-21 | End: 2021-04-02

## 2020-12-21 NOTE — TELEPHONE ENCOUNTER
Last OV 2/26/2020   Next OV Visit date not found    Requested Prescriptions     Pending Prescriptions Disp Refills    rosuvastatin (CRESTOR) 10 MG tablet [Pharmacy Med Name: ROSUVASTATIN CALCIUM 10MG TABS] 90 tablet 0     Sig: TAKE 1 TABLET BY MOUTH ONE TIME A DAY

## 2021-01-15 LAB
ANION GAP SERPL CALCULATED.3IONS-SCNC: 10 MMOL/L (ref 3–16)
BUN BLDV-MCNC: 13 MG/DL (ref 7–20)
CALCIUM SERPL-MCNC: 9.7 MG/DL (ref 8.3–10.6)
CHLORIDE BLD-SCNC: 95 MMOL/L (ref 99–110)
CO2: 27 MMOL/L (ref 21–32)
CREAT SERPL-MCNC: 0.9 MG/DL (ref 0.6–1.2)
GFR AFRICAN AMERICAN: >60
GFR NON-AFRICAN AMERICAN: >60
GLUCOSE BLD-MCNC: 87 MG/DL (ref 70–99)
MAGNESIUM: 1.9 MG/DL (ref 1.8–2.4)
POTASSIUM SERPL-SCNC: 4.2 MMOL/L (ref 3.5–5.1)
SODIUM BLD-SCNC: 132 MMOL/L (ref 136–145)

## 2021-01-19 LAB
VITAMIN D2 AND D3, TOTAL: 42.1 NG/ML (ref 30–80)
VITAMIN D2, 25 HYDROXY: <1 NG/ML
VITAMIN D3,25 HYDROXY: 42.1 NG/ML

## 2021-01-21 ENCOUNTER — HOSPITAL ENCOUNTER (EMERGENCY)
Age: 61
Discharge: HOME | End: 2021-01-21
Attending: EMERGENCY MEDICINE
Payer: COMMERCIAL

## 2021-01-21 ENCOUNTER — NURSE TRIAGE (OUTPATIENT)
Dept: OTHER | Facility: CLINIC | Age: 61
End: 2021-01-21

## 2021-01-21 ENCOUNTER — APPOINTMENT (OUTPATIENT)
Dept: GENERAL RADIOLOGY | Age: 61
End: 2021-01-21
Payer: COMMERCIAL

## 2021-01-21 VITALS
HEART RATE: 58 BPM | DIASTOLIC BLOOD PRESSURE: 83 MMHG | TEMPERATURE: 98 F | SYSTOLIC BLOOD PRESSURE: 121 MMHG | OXYGEN SATURATION: 98 % | RESPIRATION RATE: 15 BRPM

## 2021-01-21 DIAGNOSIS — R07.89 ATYPICAL CHEST PAIN: Primary | ICD-10-CM

## 2021-01-21 LAB
ANION GAP SERPL CALCULATED.3IONS-SCNC: 12 MMOL/L (ref 3–16)
ANION GAP SERPL CALCULATED.3IONS-SCNC: 8 MMOL/L (ref 3–16)
BASOPHILS ABSOLUTE: 0 K/UL (ref 0–0.2)
BASOPHILS RELATIVE PERCENT: 0.3 %
BUN BLDV-MCNC: 13 MG/DL (ref 7–20)
BUN BLDV-MCNC: 14 MG/DL (ref 7–20)
CALCIUM SERPL-MCNC: 10.1 MG/DL (ref 8.3–10.6)
CALCIUM SERPL-MCNC: 10.1 MG/DL (ref 8.3–10.6)
CHLORIDE BLD-SCNC: 100 MMOL/L (ref 99–110)
CHLORIDE BLD-SCNC: 99 MMOL/L (ref 99–110)
CO2: 28 MMOL/L (ref 21–32)
CO2: 28 MMOL/L (ref 21–32)
CREAT SERPL-MCNC: 0.7 MG/DL (ref 0.6–1.2)
CREAT SERPL-MCNC: 0.8 MG/DL (ref 0.6–1.2)
EKG ATRIAL RATE: 63 BPM
EKG DIAGNOSIS: NORMAL
EKG P AXIS: 67 DEGREES
EKG P-R INTERVAL: 130 MS
EKG Q-T INTERVAL: 384 MS
EKG QRS DURATION: 90 MS
EKG QTC CALCULATION (BAZETT): 392 MS
EKG R AXIS: 67 DEGREES
EKG T AXIS: 65 DEGREES
EKG VENTRICULAR RATE: 63 BPM
EOSINOPHILS ABSOLUTE: 0.1 K/UL (ref 0–0.6)
EOSINOPHILS RELATIVE PERCENT: 1.2 %
GFR AFRICAN AMERICAN: >60
GFR AFRICAN AMERICAN: >60
GFR NON-AFRICAN AMERICAN: >60
GFR NON-AFRICAN AMERICAN: >60
GLUCOSE BLD-MCNC: 82 MG/DL (ref 70–99)
GLUCOSE BLD-MCNC: 93 MG/DL (ref 70–99)
HCT VFR BLD CALC: 43.3 % (ref 36–48)
HEMOGLOBIN: 14.3 G/DL (ref 12–16)
LV EF: 75 %
LVEF MODALITY: NORMAL
LYMPHOCYTES ABSOLUTE: 0.9 K/UL (ref 1–5.1)
LYMPHOCYTES RELATIVE PERCENT: 19.4 %
MCH RBC QN AUTO: 30.1 PG (ref 26–34)
MCHC RBC AUTO-ENTMCNC: 33 G/DL (ref 31–36)
MCV RBC AUTO: 91.2 FL (ref 80–100)
MONOCYTES ABSOLUTE: 0.4 K/UL (ref 0–1.3)
MONOCYTES RELATIVE PERCENT: 8.7 %
NEUTROPHILS ABSOLUTE: 3.4 K/UL (ref 1.7–7.7)
NEUTROPHILS RELATIVE PERCENT: 70.4 %
PDW BLD-RTO: 12.7 % (ref 12.4–15.4)
PLATELET # BLD: 232 K/UL (ref 135–450)
PMV BLD AUTO: 7.6 FL (ref 5–10.5)
POTASSIUM REFLEX MAGNESIUM: 4.2 MMOL/L (ref 3.5–5.1)
POTASSIUM SERPL-SCNC: 4.2 MMOL/L (ref 3.5–5.1)
PRO-BNP: 81 PG/ML (ref 0–124)
RBC # BLD: 4.74 M/UL (ref 4–5.2)
SODIUM BLD-SCNC: 135 MMOL/L (ref 136–145)
SODIUM BLD-SCNC: 140 MMOL/L (ref 136–145)
TROPONIN: <0.01 NG/ML
WBC # BLD: 4.8 K/UL (ref 4–11)

## 2021-01-21 PROCEDURE — 80048 BASIC METABOLIC PNL TOTAL CA: CPT

## 2021-01-21 PROCEDURE — 96374 THER/PROPH/DIAG INJ IV PUSH: CPT

## 2021-01-21 PROCEDURE — 84484 ASSAY OF TROPONIN QUANT: CPT

## 2021-01-21 PROCEDURE — 93005 ELECTROCARDIOGRAM TRACING: CPT | Performed by: PHYSICIAN ASSISTANT

## 2021-01-21 PROCEDURE — 3430000000 HC RX DIAGNOSTIC RADIOPHARMACEUTICAL: Performed by: PHYSICIAN ASSISTANT

## 2021-01-21 PROCEDURE — 93226 XTRNL ECG REC<48 HR SCAN A/R: CPT

## 2021-01-21 PROCEDURE — A9502 TC99M TETROFOSMIN: HCPCS | Performed by: PHYSICIAN ASSISTANT

## 2021-01-21 PROCEDURE — 78452 HT MUSCLE IMAGE SPECT MULT: CPT

## 2021-01-21 PROCEDURE — 83880 ASSAY OF NATRIURETIC PEPTIDE: CPT

## 2021-01-21 PROCEDURE — 99282 EMERGENCY DEPT VISIT SF MDM: CPT

## 2021-01-21 PROCEDURE — 99285 EMERGENCY DEPT VISIT HI MDM: CPT | Performed by: INTERNAL MEDICINE

## 2021-01-21 PROCEDURE — 2580000003 HC RX 258: Performed by: PHYSICIAN ASSISTANT

## 2021-01-21 PROCEDURE — 93225 XTRNL ECG REC<48 HRS REC: CPT

## 2021-01-21 PROCEDURE — 96376 TX/PRO/DX INJ SAME DRUG ADON: CPT

## 2021-01-21 PROCEDURE — 93017 CV STRESS TEST TRACING ONLY: CPT

## 2021-01-21 PROCEDURE — 85025 COMPLETE CBC W/AUTO DIFF WBC: CPT

## 2021-01-21 PROCEDURE — 71046 X-RAY EXAM CHEST 2 VIEWS: CPT

## 2021-01-21 RX ORDER — SODIUM CHLORIDE 0.9 % (FLUSH) 0.9 %
10 SYRINGE (ML) INJECTION 2 TIMES DAILY
Status: DISCONTINUED | OUTPATIENT
Start: 2021-01-21 | End: 2021-01-21 | Stop reason: HOSPADM

## 2021-01-21 RX ADMIN — TETROFOSMIN 30 MILLICURIE: 1.38 INJECTION, POWDER, LYOPHILIZED, FOR SOLUTION INTRAVENOUS at 13:16

## 2021-01-21 RX ADMIN — Medication 10 ML: at 11:48

## 2021-01-21 RX ADMIN — Medication 10 ML: at 13:19

## 2021-01-21 RX ADMIN — TETROFOSMIN 10 MILLICURIE: 1.38 INJECTION, POWDER, LYOPHILIZED, FOR SOLUTION INTRAVENOUS at 12:03

## 2021-01-21 ASSESSMENT — PAIN SCALES - GENERAL: PAINLEVEL_OUTOF10: 4

## 2021-01-21 ASSESSMENT — HEART SCORE: ECG: 0

## 2021-01-21 ASSESSMENT — PAIN DESCRIPTION - DESCRIPTORS: DESCRIPTORS: ACHING

## 2021-01-21 NOTE — TELEPHONE ENCOUNTER
Brief description of triage: having chest pressure with mild pain, radiate to back and shoulder, strong family history of MI's. Triage indicates for patient to go to the ED    Care advice provided, patient verbalizes understanding; denies any other questions or concerns; instructed to call back for any new or worsening symptoms. Attention Provider: Thank you for allowing me to participate in the care of your patient. The patient was connected to triage in response to symptoms provided. Please do not respond through this encounter as the response is not directed to a shared pool. Reason for Disposition   SEVERE chest pain    Answer Assessment - Initial Assessment Questions  1. LOCATION: \"Where does it hurt? \"        Under left breast through to the back and shoulder     2. RADIATION: \"Does the pain go anywhere else? \" (e.g., into neck, jaw, arms, back)      Yes to back and shoulder    3. ONSET: \"When did the chest pain begin? \" (Minutes, hours or days)       01/20/21    4. PATTERN \"Does the pain come and go, or has it been constant since it started? \"  \"Does it get worse with exertion? \"       Constant     5. DURATION: \"How long does it last\" (e.g., seconds, minutes, hours)      1 hour     6. SEVERITY: \"How bad is the pain? \"  (e.g., Scale 1-10; mild, moderate, or severe)     - MILD (1-3): doesn't interfere with normal activities      - MODERATE (4-7): interferes with normal activities or awakens from sleep     - SEVERE (8-10): excruciating pain, unable to do any normal activities        3-4/10    7. CARDIAC RISK FACTORS: \"Do you have any history of heart problems or risk factors for heart disease? \" (e.g., angina, prior heart attack; diabetes, high blood pressure, high cholesterol, smoker, or strong family history of heart disease)      Is on a statin, family history of MI's     8. PULMONARY RISK FACTORS: \"Do you have any history of lung disease? \"  (e.g., blood clots in lung, asthma, emphysema, birth control pills)      Denies     9. CAUSE: \"What do you think is causing the chest pain? \"      Does not know, is being treated for electrolyte imbalance     10. OTHER SYMPTOMS: \"Do you have any other symptoms? \" (e.g., dizziness, nausea, vomiting, sweating, fever, difficulty breathing, cough)        Dizzy, fussy in the brain, chest pressure, states \"just doesn't feel right\"     11. PREGNANCY: \"Is there any chance you are pregnant? \" \"When was your last menstrual period? \"       N/a    Protocols used: CHEST PAIN-ADULT-[place]

## 2021-01-21 NOTE — ED TRIAGE NOTES
Pt comes to ED complaining of chest pains starting overnight and now just does not feel like herself. Also complains of some SOB/fatigue.

## 2021-01-21 NOTE — ED PROVIDER NOTES
ED Attending Attestation Note     Date of evaluation: 1/21/2021    This patient was seen by the advance practice provider. I have seen and examined the patient, agree with the workup, evaluation, management and diagnosis. The care plan has been discussed. I have reviewed the ECG and concur with the NAZ's interpretation. My assessment reveals nontender chest and abdomen, clear lungs and nonlabored breathing, no lower extremity edema.      Miryam Stewart MD  01/21/21 2078

## 2021-01-21 NOTE — ED PROVIDER NOTES
[address] ENCOUNTER          PHYSICIAN ASSISTANT NOTE       *[facility] has declared a state of emergency regarding the [address] pandemic*    Date of evaluation: 1/21/2021    Chief Complaint     Chest Pain (since last evening)      History of Present Illness     Addison Shane is a 61 y.o. female with a history of hyperlipidemia and GERD presents today with chest pain. Patient states that 3 AM she awoke to left-sided chest pain. She describes it as a pressure that radiates towards the left side of her back. It lasted approximately 30 minutes and resolved spontaneously. She experienced a second and third episode around 6 AM and 8 AM which prompted her evaluation. On arrival she is chest pain-free but states she feels slightly lightheaded and not quite herself. She denies any associated nausea, vomiting, body aches, chills, sweats, cough, chest pain with inspiration, or history of DVT/PE. She has an extensive family history of myocardial infarction prior to the age of 72. She denies any recent long distance travel or use of estrogen replacement. Review of Systems     As documented in the HPI, otherwise all other systems were reviewed and were negative. Past Medical, Surgical, Family, and Social History     She has a past medical history of Allergic rhinitis, Chicken pox, Chondromalacia patellae, left knee, Dyspareunia, HPV (human papilloma virus) anogenital infection, Hyperlipidemia, PONV (postoperative nausea and vomiting), Recurrent cold sores, and STD (sexually transmitted disease). She has a past surgical history that includes Tooth Extraction; Colposcopy; LEEP; Skin tag removal; Knee arthroscopy (Left, 07/26/2018); Upper gastrointestinal endoscopy (N/A, 8/28/2020); Upper gastrointestinal endoscopy (N/A, 8/28/2020); Colonoscopy (8/28/2020); and Upper gastrointestinal endoscopy (N/A, 12/4/2020). Musculoskeletal:  Ambulates under own control. Atraumatic exam with no focal swelling or tenderness. No peripheral edema. Strength 5/5 in all four extremities. Neuro:  Alert and oriented x 3. CN II - XII grossly intact. Moves all extremities spontaneously.     Vascular:  2+ peripheral pulses in bilateral upper and lower extremities      Skin:  Warm and well perfused without rashes or lesions    Psych:  Appropriate mood and affect        Diagnostic Results     EKG   Interpreted in conjunction with emergency department physician No att. providers found  Indication: Chest pain  Impression: Normal sinus rhythm with a heart rate of 63 bpm, stable axis, stable intervals, possible atrial enlargement, otherwise no acute ischemic findings    RADIOLOGY:  NM MYOCARDIAL SPECT REST EXERCISE OR RX   Final Result      XR CHEST (2 VW)   Final Result      No consolidation          LABS:   Results for orders placed or performed during the hospital encounter of 01/21/21   CBC Auto Differential   Result Value Ref Range    WBC 4.8 4.0 - 11.0 K/uL    RBC 4.74 4.00 - 5.20 M/uL    Hemoglobin 14.3 12.0 - 16.0 g/dL    Hematocrit 43.3 36.0 - 48.0 %    MCV 91.2 80.0 - 100.0 fL    MCH 30.1 26.0 - 34.0 pg    MCHC 33.0 31.0 - 36.0 g/dL    RDW 12.7 12.4 - 15.4 %    Platelets 227 191 - 285 K/uL    MPV 7.6 5.0 - 10.5 fL    Neutrophils % 70.4 %    Lymphocytes % 19.4 %    Monocytes % 8.7 %    Eosinophils % 1.2 %    Basophils % 0.3 %    Neutrophils Absolute 3.4 1.7 - 7.7 K/uL    Lymphocytes Absolute 0.9 (L) 1.0 - 5.1 K/uL    Monocytes Absolute 0.4 0.0 - 1.3 K/uL    Eosinophils Absolute 0.1 0.0 - 0.6 K/uL    Basophils Absolute 0.0 0.0 - 0.2 K/uL   Basic Metabolic Panel w/ Reflex to MG   Result Value Ref Range    Sodium 135 (L) 136 - 145 mmol/L    Potassium reflex Magnesium 4.2 3.5 - 5.1 mmol/L    Chloride 99 99 - 110 mmol/L    CO2 28 21 - 32 mmol/L    Anion Gap 8 3 - 16    Glucose 93 70 - 99 mg/dL    BUN 13 7 - 20 mg/dL CREATININE 0.7 0.6 - 1.2 mg/dL    GFR Non-African American >60 >60    GFR African American >60 >60    Calcium 10.1 8.3 - 10.6 mg/dL   Brain Natriuretic Peptide   Result Value Ref Range    Pro-BNP 81 0 - 124 pg/mL   Troponin   Result Value Ref Range    Troponin <0.01 <0.01 ng/mL   Troponin   Result Value Ref Range    Troponin <0.01 <0.01 ng/mL   Troponin   Result Value Ref Range    Troponin <0.01 <0.01 ng/mL   EKG 12 Lead   Result Value Ref Range    Ventricular Rate 63 BPM    Atrial Rate 63 BPM    P-R Interval 130 ms    QRS Duration 90 ms    Q-T Interval 384 ms    QTc Calculation (Bazett) 392 ms    P Axis 67 degrees    R Axis 67 degrees    T Axis 65 degrees    Diagnosis       EKG performed in ER and to be interpreted by ER physician. Confirmed by MD, ER ([id number]),  Israel Brown (LEONARDO), ANNETTA ([id number]) on 1/21/2021 9:28:02 AM       ED BEDSIDE ULTRASOUND:      RECENT VITALS:  BP: 119/79, Temp: 98 °F (36.7 °C), Pulse: 59, Resp: 13, SpO2: 98 %     Procedures         ED Course     Nursing Notes, Past Medical Hx, Past Surgical Hx, Social Hx, Allergies, and Family Hx were reviewed.     The patient was given the following medications:  Orders Placed This Encounter   Medications    technetium tetrofosmin (Tc-MYOVIEW) injection 10 millicurie    technetium tetrofosmin (Tc-MYOVIEW) injection 30 millicurie    sodium chloride flush 0.9 % injection 10 mL       CONSULTS:  None    MEDICAL DECISION Deann Melendez / Ricky Chen / Shravan Gallardo Ami Moura is a 61 y.o. female with an extensive family history of myocardial infarctions prior to the age of 72 presents today with unprovoked left-sided chest pain. On arrival she is chest pain-free but feels slightly off. EKG nonischemic. Vitals reassuring. Heart score is 4 given her suspicious history, risk factors, age. Troponin negative x2. She met criteria for same-day stress testing. This showed normal uptake at stress and rest without evidence of ischemia or scar with ejection fraction 75%. Estimate that she is low risk. On my final evaluation she tells me her chest pain is still not present. We discussed the unlikelihood of ACS, pulmonary embolism, or other severe etiologies. She will follow-up with her primary care doctor as needed. Return precautions discussed extensively. This patient was also evaluated by the attending physician. All care plans were discussed and agreed upon. Clinical Impression     1.  Atypical chest pain        Disposition     PATIENT REFERRED TO:  Brian Tracey MD  [address] [place] [id number]  [phone]    Schedule an appointment as soon as possible for a visit         DISCHARGE MEDICATIONS:  New Prescriptions    No medications on file       DISPOSITION Decision To Discharge 01/21/2021 03:00:28 PM        Manuela Marroquin PA-C  01/21/21 [id number]

## 2021-01-21 NOTE — CONSULTS
[facility]    Cardiology Consult/H&P  Consulting Cardiologist Yanira Molina M.D., [facility] [place]  Referring Provider:  Nubia Mcnamara MD    1/21/2021, 2:25 PM    Chief Complaint   Patient presents with    Chest Pain     since last evening      Primary Cardiologist:  Asked by No admitting provider for patient encounter./Christopher Jara MD to evaluate and assess this patient's is being seen in the nuclear medicine area and in the emergency department for evaluation of her chest discomfort and her abnormal EKG on stress test.  This patient is 61years old and does not have any known history of CAD but does have a strongly positive family history in that her brothers and her father and other siblings had coronary disease. She has been experiencing problem with vague chest pains with palpitations and tachycardia. Occasionally feels as if her head is somewhat foggy and somewhat lightheaded but no blackout spells or syncope. Nuclear medicine stress test was performed at which time she exercised for 6 minutes and 50 seconds achieving a target heart rate. No chest pains and blood pressure was adequate. She developed diffuse inferior and anterolateral ST segment depression of 0.5 to .8 mm. No apparent chest pains. The examination is unremarkable. There is no peripheral edema. She does take Crestor for hyperlipidemia. The cardiac enzymes are negative x3 troponins less than 0.01 on 3 determinations. Glucose 93 creatinine is 0.7 CBC is normal  Preliminary interpretation of the nuclear stress is negative. We will await the official review.     History of Present Illness:   Angélica Cyr is a 61 y.o. female see above       Past Medical History: has a past medical history of Allergic rhinitis, Chicken pox, Chondromalacia patellae, left knee, Dyspareunia, HPV (human papilloma virus) anogenital infection, Hyperlipidemia, PONV (postoperative nausea and vomiting), Recurrent cold sores, and STD (sexually transmitted disease). Surgical History:   has a past surgical history that includes Tooth Extraction; Colposcopy; LEEP; Skin tag removal; Knee arthroscopy (Left, 07/26/2018); Upper gastrointestinal endoscopy (N/A, 8/28/2020); Upper gastrointestinal endoscopy (N/A, 8/28/2020); Colonoscopy (8/28/2020); and Upper gastrointestinal endoscopy (N/A, 12/4/2020). Social History:   reports that she has never smoked. She has never used smokeless tobacco. She reports current alcohol use of about 8.0 standard drinks of alcohol per week. She reports that she does not use drugs. Family History:  family history includes Arthritis in her father; Cancer in her father; Depression in her mother; Heart Disease in her brother, father, maternal grandfather, and paternal grandfather; High Blood Pressure in her father and mother; High Cholesterol in her brother, father, mother, and sister; Kidney Disease in her father. Home Medications:  Prior to Admission medications    Medication Sig Start Date End Date Taking?  Authorizing Provider   rosuvastatin (CRESTOR) 10 MG tablet TAKE 1 TABLET BY MOUTH ONE TIME A DAY 12/21/20   Fina Rodriguez MD   omeprazole (PRILOSEC) 40 MG delayed release capsule  9/21/20   Historical Provider, MD   valACYclovir (VALTREX) 1 g tablet TAKE TWO TABLETS BY MOUTH TWICE A DAY FOR 2 DOSES AS NEEDED 11/6/20   Fina Rodriguez MD   Turmeric (QC TUMERIC COMPLEX PO) Take 1 capsule by mouth    Historical Provider, MD HENRIQUEZ Elderberry syrup    Historical Provider, MD   vitamin B-12 (CYANOCOBALAMIN) 100 MCG tablet Take 50 mcg by mouth daily    Historical Provider, MD   Coenzyme Q10 (COQ10) 100 MG CAPS Take by mouth    Historical Provider, MD · Integumentary: No rash or pruritis. Endocrine: No malaise, fatigue or temperature intolerance. Hematologic/Lymphatic: No abnormal bruising or bleeding, blood clots or swollen lymph nodes. · Allergic/Immunologic: No nasal congestion or hives. · All other ROS are reviewed and are unremarkable. Physical Examination:    Vitals:    01/21/21 0912 01/21/21 0930 01/21/21 1030   BP: 122/80 139/89 119/79   Pulse: 61 68 59   Resp: 18 17 13   Temp: 98 °F (36.7 °C)     TempSrc: Oral     SpO2: 100% 98%         EXAMl and General Appearance:  Healthy. And alert . HEENT: eyes and ears intact. No nasal masses  THYROID: not enlarged  LUNGS:  · Clear to auscultation and percussion  HEART and VASCULAR:  · The apical impulses not displaced  · Heart tones are crisp and normal  · Cervical veins are not engorged  · The carotid upstroke is normal in amplitude and contour without delay or bruit  · Peripheral pulses are symmetrical and full  · There is no clubbing, cyanosis of the extremities. · No peripheral edema  · Femoral Arteries: 2+ and equal  · Pedal Pulses:2+ and equal   ABDOMEN[de-identified]  · No masses or tenderness  · Liver/Spleen: No Abnormalities Noted  NEUROLOGICAL:  . Moves all extremities to command. Cranial nerves 2-12 are in tact.   PSYCHIATRIC: alert and lucid  and oriented and appropriate  SKIN: No lesions or rashes  LYMPH NODES: none enlarged    ·   ·   ·     CBC with Differential:    Lab Results   Component Value Date    WBC 4.8 01/21/2021    RBC 4.74 01/21/2021    HGB 14.3 01/21/2021    HCT 43.3 01/21/2021     01/21/2021    MCV 91.2 01/21/2021    MCH 30.1 01/21/2021    MCHC 33.0 01/21/2021    RDW 12.7 01/21/2021    SEGSPCT 72.3 02/19/2013    LYMPHOPCT 19.4 01/21/2021    MONOPCT 8.7 01/21/2021    EOSPCT 1.7 02/17/2012    BASOPCT 0.3 01/21/2021    MONOSABS 0.4 01/21/2021    LYMPHSABS 0.9 01/21/2021    EOSABS 0.1 01/21/2021    BASOSABS 0.0 01/21/2021    DIFFTYPE Auto-K 02/19/2013     BMP:    Lab Results Component Value Date     01/21/2021    K 4.2 01/21/2021    CL 99 01/21/2021    CO2 28 01/21/2021    BUN 13 01/21/2021    LABALBU 4.6 03/11/2020    CREATININE 0.7 01/21/2021    CALCIUM 10.1 01/21/2021    GFRAA >60 01/21/2021    GFRAA >60 08/02/2012    LABGLOM >60 01/21/2021     Uric Acid:  No components found for: URIC  PT/INR:  No results found for: PROTIME, INR  Last 3 Troponin:    Lab Results   Component Value Date    TROPONINI <0.01 01/21/2021    TROPONINI <0.01 01/21/2021    TROPONINI <0.01 01/21/2021     FLP:    Lab Results   Component Value Date    TRIG 119 03/11/2020    HDL 41 03/11/2020    HDL 42 02/17/2012    LDLCALC 100 03/11/2020    LABVLDL 24 03/11/2020       EKG: On 1/21/2021 sinus rhythm at 66/min. Normal study. echocardiogram pending  Assessment/ Plan     Patient Active Problem List    Diagnosis Date Noted    Mixed hyperlipidemia 04/11/2017    Chronic seasonal allergic rhinitis due to pollen 04/11/2017    Chondromalacia patellae, left knee 04/18/2016    Baker cyst 09/23/2015    Acne vulgaris 08/20/2013    Atrophic vaginitis 02/19/2013    Vitamin D deficiency 02/07/2012    Recurrent cold sores 02/07/2012   Atypical chest discomfort with a stress EKG which is uninterpretable due to the diffuse ST segment changes. The stress  nuclear study I am told is negative. Ejection fraction is normal at 65%. This does not sound to be a cardiac ischemia problem. She may have some tacky arrhythmias and may very well benefit from wearing a monitor for a period of a couple weeks. In the meantime continue current therapy. Thanks for allowing me the opportunity  to participate in the evaluation and care of your patients.  Please call if we can assist further [phone]    This chart was likely completed using voice recognition technology and may contain unintended grammatical , phraseology,and/or punctuation errors  Sadi Peters M.D., [facility] [place]  1/21/20212:25 PM

## 2021-01-21 NOTE — ED NOTES
Ekg/holter notified and will possibly be able to connect patient prior to discharge.       Jorge Avery RN  01/21/21 7949

## 2021-02-01 ENCOUNTER — OFFICE VISIT (OUTPATIENT)
Dept: DERMATOLOGY | Age: 61
End: 2021-02-01
Payer: COMMERCIAL

## 2021-02-01 VITALS — TEMPERATURE: 96.8 F

## 2021-02-01 DIAGNOSIS — L82.1 SEBORRHEIC KERATOSIS: ICD-10-CM

## 2021-02-01 DIAGNOSIS — L57.0 AK (ACTINIC KERATOSIS): ICD-10-CM

## 2021-02-01 DIAGNOSIS — B00.9 HSV INFECTION: ICD-10-CM

## 2021-02-01 DIAGNOSIS — D17.1 LIPOMA OF BACK: ICD-10-CM

## 2021-02-01 DIAGNOSIS — D22.9 MULTIPLE NEVI: Primary | ICD-10-CM

## 2021-02-01 DIAGNOSIS — Z86.018 HISTORY OF DYSPLASTIC NEVUS: ICD-10-CM

## 2021-02-01 PROCEDURE — 99214 OFFICE O/P EST MOD 30 MIN: CPT | Performed by: DERMATOLOGY

## 2021-02-01 NOTE — PROGRESS NOTES
[facility]  Carlos Perkins MD  [id number]  1960    61 y.o. female     Date of Visit: 2/1/2021    Chief Complaint: moles, lesions  Chief Complaint   Patient presents with    Skin Exam     Last seen: 7-2020    History of Present Illness:    1. Here for evaluation of multiple asx pigmented lesions on the trunk and extremities, present for many years; no change in size/shape/color of any lesions; no bleeding lesions. 2. S/p excision of lipoma on the L upper back in 2018, had been present since at least [id number]. Healed well - large well-defined lipomatous mass removed and path benign. Unfortunately it has recurred - has a mobile soft subcutaneous nodule/mass growing back since early - mid 2020. Asx.    3. She has a history of AK on the nose. No new concerns noted. 4. Intermittently has flares of cold sores. Had a flare on the L ala in 2017 - resolved but had a persistent erythematous area/scar from the flare. Has valtrex at home to use prn flares. No recent flares. 5. F/u for Moderately dysplastic nevus on the R upper back, narrowly excised with bx 7-2020. No probs since. Previously addressed:  Hx of intermittent breakouts of the chin and lower cheeks. No trx tried. Hx of HSV - has valtrex at home. No personal or family history of skin cancer. 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 Review of Systems:  Gen: Feels well, good sense of health. Skin: No changing moles or lesions. Past Medical History, Family History, Surgical History, Medications and Allergies reviewed.     Past Medical History:   Diagnosis Date    Allergic rhinitis     Chicken pox     Chondromalacia patellae, left knee     Dyspareunia     HPV (human papilloma virus) anogenital infection     Hyperlipidemia     PONV (postoperative nausea and vomiting)     Recurrent cold sores     STD (sexually transmitted disease)     HPV       Past Surgical History:   Procedure Laterality Date    COLONOSCOPY  8/28/2020    COLONOSCOPY WITH BIOPSY performed by Yun Nguyen MD at [address] [place] [address] ARTHROSCOPY Left 07/26/2018    LEEP      SKIN TAG REMOVAL      TOOTH EXTRACTION      UPPER GASTROINTESTINAL ENDOSCOPY N/A 8/28/2020    EGD BIOPSY performed by Yun Nguyen MD at [facility] 8/28/2020    EGD Fleeta Loge performed by Yun Nguyen MD at [facility] 12/4/2020    ESOPHAGOGASTRODUODENOSCOPY performed by Haylie Coulter MD at [address] [place] [address] Medications Marked as Taking for the 2/1/21 encounter (Office Visit) with Tor Maza MD   Medication Sig Dispense Refill    rosuvastatin (CRESTOR) 10 MG tablet TAKE 1 TABLET BY MOUTH ONE TIME A DAY 90 tablet 0    omeprazole (PRILOSEC) 40 MG delayed release capsule       valACYclovir (VALTREX) 1 g tablet TAKE TWO TABLETS BY MOUTH TWICE A DAY FOR 2 DOSES AS NEEDED 36 tablet 0    Turmeric (QC TUMERIC COMPLEX PO) Take 1 capsule by mouth      NONFORMULARY Elderberry syrup      vitamin B-12 (CYANOCOBALAMIN) 100 MCG tablet Take 50 mcg by mouth daily      Probiotic Product (PROBIOTIC-10 ULTIMATE PO) Take by mouth      Vitamin D (CHOLECALCIFEROL) 1000 UNITS CAPS capsule Take 1,000 Units by mouth daily. Allergies   Allergen Reactions    Penicillins Hives    Sulfa Antibiotics Hives         Physical Examination     Gen, well-appearing  The following were examined and determined to be normal: Psych/Neuro, Scalp/hair, Conjunctivae/eyelids, Gums/teeth/lips, Neck, Breast/axilla/chest, Abdomen, RUE, LUE, RLE, LLE, Nails/digits and buttocks. Head/face   The following were examined and determined to be abnormal:  Back.    trunk and extremities with scattered brown macules and papules   R plantar surface with medium brown macule  L medial scapular area with well-approximated linear scar with mobile subcut fairly soft mass  No AK's  L ala clear; no hSV  Upper back, R of midline with scar - clear, no pigmente    Assessment and Plan     1. Benign-appearing nevi and SK's  - educ re si/sx/ABCD's of MM   educ sun protection - OTC sunscreen with SPF 30-50+ recommended and reviewed usage  encouraged skin check yearly (sooner if indicated), self checks    2. Lipoma - L upper back - excised but recurrent  - discussed option of repeat excision or can refer to plastic/gen surg    3. AK's - clear today  - cont sun protection    4. HSV flares; clear today  - cont valtrex prn flares    5. R upper back - dysplatic nevus - no signs recurrence  - educ re si/sx/ABCD's of MM   educ sun protection - OTC sunscreen with SPF 30-50+ recommended and reviewed usage  encouraged skin check yearly (sooner if indicated), self checks     F/u 1 year if benign.

## 2021-02-02 LAB
ACQUISITION DURATION: NORMAL S
AVERAGE HEART RATE: 64 BPM
EKG DIAGNOSIS: NORMAL
FASTEST SUPRAVENTRICULAR RATE: 119 BPM
HOLTER MAX HEART RATE: 126 BPM
HOOKUP DATE: NORMAL
HOOKUP TIME: NORMAL
LONGEST SUPRAVENTRICULAR RATE: 85 BPM
MAX HEART RATE TIME/DATE: NORMAL
MIN HEART RATE TIME/DATE: NORMAL
MIN HEART RATE: 50 BPM
NUMBER OF FASTEST SUPRAVENTRICULAR BEATS: 4
NUMBER OF LONGEST SUPRAVENTRICULAR BEATS: 7
NUMBER OF QRS COMPLEXES: NORMAL
NUMBER OF SUPRAVENTRICULAR BEATS IN RUNS: 14
NUMBER OF SUPRAVENTRICULAR COUPLETS: 2
NUMBER OF SUPRAVENTRICULAR ECTOPICS: 53
NUMBER OF SUPRAVENTRICULAR ISOLATED BEATS: 35
NUMBER OF SUPRAVENTRICULAR RUNS: 3
NUMBER OF VENTRICULAR BEATS IN RUNS: 0
NUMBER OF VENTRICULAR BIGEMINAL CYCLES: 0
NUMBER OF VENTRICULAR COUPLETS: 0
NUMBER OF VENTRICULAR ECTOPICS: 4
NUMBER OF VENTRICULAR ISOLATED BEATS: 4
NUMBER OF VENTRICULAR RUNS: 0

## 2021-02-04 ENCOUNTER — VIRTUAL VISIT (OUTPATIENT)
Dept: FAMILY MEDICINE CLINIC | Age: 61
End: 2021-02-04
Payer: COMMERCIAL

## 2021-02-04 DIAGNOSIS — E55.9 VITAMIN D DEFICIENCY: ICD-10-CM

## 2021-02-04 DIAGNOSIS — E78.2 MIXED HYPERLIPIDEMIA: Primary | ICD-10-CM

## 2021-02-04 DIAGNOSIS — E87.1 HYPONATREMIA: ICD-10-CM

## 2021-02-04 PROCEDURE — 99213 OFFICE O/P EST LOW 20 MIN: CPT | Performed by: FAMILY MEDICINE

## 2021-02-04 SDOH — ECONOMIC STABILITY: INCOME INSECURITY: HOW HARD IS IT FOR YOU TO PAY FOR THE VERY BASICS LIKE FOOD, HOUSING, MEDICAL CARE, AND HEATING?: NOT HARD AT ALL

## 2021-02-04 ASSESSMENT — PATIENT HEALTH QUESTIONNAIRE - PHQ9
1. LITTLE INTEREST OR PLEASURE IN DOING THINGS: 0
SUM OF ALL RESPONSES TO PHQ9 QUESTIONS 1 & 2: 0
SUM OF ALL RESPONSES TO PHQ QUESTIONS 1-9: 0
SUM OF ALL RESPONSES TO PHQ QUESTIONS 1-9: 0

## 2021-02-04 NOTE — PROGRESS NOTES
Antonio Saldaña (:  1960) is a 61 y.o. female,Established patient, here for evaluation of the following chief complaint(s): Follow-Up from Hospital      ASSESSMENT/PLAN:  1. Mixed hyperlipidemia  Stable on statin  2. Vitamin D deficiency  Recheck to see if stable  3. Hyponatremia  Improved but not resolved on last blood test  Recheck ordered. No follow-ups on file. SUBJECTIVE/OBJECTIVE:  HPI   Pt is a of 61 y.o. female comes in today with   Chief Complaint   Patient presents with    Follow-Up from Hospital     Was in the hospital for chest pain. workup was negative  Stress test and Holter benign  hyperlipidemia have been stable on statin    Had 2nd shingrix     Sodium has been a little low    Review of Systems    Patient-Reported Vitals 2021   Patient-Reported Weight 170lb   Patient-Reported Height 5' 10\"   Patient-Reported Systolic 197   Patient-Reported Diastolic 80        Physical Exam                Antonio Saldaña is a 61 y.o. female being evaluated by a Virtual Visit (video visit) encounter to address concerns as mentioned above. A caregiver was present when appropriate. Due to this being a TeleHealth encounter (During [facility] public health emergency), evaluation of the following organ systems was limited: Vitals/Constitutional/EENT/Resp/CV/GI//MS/Neuro/Skin/Heme-Lymph-Imm. Pursuant to the emergency declaration under the [facility], [address] [place] [address] authority and the [employer] and [employer] Act, this Virtual Visit was conducted with patient's (and/or legal guardian's) consent, to reduce the patient's risk of exposure to COVID-19 and provide necessary medical care. The patient (and/or legal guardian) has also been advised to contact this office for worsening conditions or problems, and seek emergency medical treatment and/or call 911 if deemed necessary. Patient identification was verified at the start of the visit: Yes    Services were provided through a video synchronous discussion virtually to substitute for in-person clinic visit. Patient was located at home and provider was located in office or at home. An electronic signature was used to authenticate this note.     --Jay Romero MD

## 2021-03-10 ENCOUNTER — TELEPHONE (OUTPATIENT)
Dept: FAMILY MEDICINE CLINIC | Age: 61
End: 2021-03-10

## 2021-03-10 RX ORDER — NITROFURANTOIN 25; 75 MG/1; MG/1
100 CAPSULE ORAL 2 TIMES DAILY
Qty: 14 CAPSULE | Refills: 0 | Status: SHIPPED | OUTPATIENT
Start: 2021-03-10 | End: 2021-03-17

## 2021-03-10 NOTE — TELEPHONE ENCOUNTER
Allergies   Allergen Reactions    Penicillins Hives    Sulfa Antibiotics Hives     Sorry; I must have missed the flag.  macrobid sent

## 2021-04-02 DIAGNOSIS — E78.2 MIXED HYPERLIPIDEMIA: ICD-10-CM

## 2021-04-02 RX ORDER — ROSUVASTATIN CALCIUM 10 MG/1
TABLET, COATED ORAL
Qty: 90 TABLET | Refills: 0 | Status: SHIPPED | OUTPATIENT
Start: 2021-04-02 | End: 2021-07-02

## 2021-05-24 ENCOUNTER — HOSPITAL ENCOUNTER (OUTPATIENT)
Dept: MAMMOGRAPHY | Age: 61
Discharge: HOME | End: 2021-05-24
Payer: COMMERCIAL

## 2021-05-24 VITALS — HEIGHT: 70 IN | WEIGHT: 170 LBS | BODY MASS INDEX: 24.34 KG/M2

## 2021-05-24 DIAGNOSIS — Z12.31 VISIT FOR SCREENING MAMMOGRAM: ICD-10-CM

## 2021-05-24 PROCEDURE — 77063 BREAST TOMOSYNTHESIS BI: CPT

## 2021-05-25 ENCOUNTER — OFFICE VISIT (OUTPATIENT)
Dept: GYNECOLOGY | Age: 61
End: 2021-05-25
Payer: COMMERCIAL

## 2021-05-25 VITALS
HEART RATE: 54 BPM | WEIGHT: 175.8 LBS | RESPIRATION RATE: 17 BRPM | SYSTOLIC BLOOD PRESSURE: 106 MMHG | BODY MASS INDEX: 25.17 KG/M2 | HEIGHT: 70 IN | DIASTOLIC BLOOD PRESSURE: 70 MMHG

## 2021-05-25 DIAGNOSIS — Z01.419 WELL WOMAN EXAM WITH ROUTINE GYNECOLOGICAL EXAM: Primary | ICD-10-CM

## 2021-05-25 PROCEDURE — 99396 PREV VISIT EST AGE 40-64: CPT | Performed by: OBSTETRICS & GYNECOLOGY

## 2021-05-26 ASSESSMENT — ENCOUNTER SYMPTOMS
EYES NEGATIVE: 1
GASTROINTESTINAL NEGATIVE: 1
RESPIRATORY NEGATIVE: 1

## 2021-05-27 NOTE — PROGRESS NOTES
Subjective:      Patient ID: Paige Jones is a 61 y.o. female. Patient is here for annual. Patient in menopause. Gynecologic Exam        Review of Systems   Constitutional: Negative. HENT: Negative. Eyes: Negative. Respiratory: Negative. Cardiovascular: Negative. Gastrointestinal: Negative. Genitourinary: Negative. Musculoskeletal: Negative. Skin: Negative. Neurological: Negative. Psychiatric/Behavioral: Negative.       Date of Birth 1960  Past Medical History:   Diagnosis Date    Allergic rhinitis     Chicken pox     Chondromalacia patellae, left knee     Dyspareunia     HPV (human papilloma virus) anogenital infection     Hyperlipidemia     PONV (postoperative nausea and vomiting)     Recurrent cold sores     STD (sexually transmitted disease)     HPV     Past Surgical History:   Procedure Laterality Date    COLONOSCOPY  2020    COLONOSCOPY WITH BIOPSY performed by Yemi Niño MD at [address] [place] [address] ARTHROSCOPY Left 2018    LEEP      SKIN TAG REMOVAL      TOOTH EXTRACTION      UPPER GASTROINTESTINAL ENDOSCOPY N/A 2020    EGD BIOPSY performed by Yemi Niño MD at  "[employer] N/A 2020    EGD Sandee Diane performed by Yemi Niño MD at  "[employer] N/A 2020    ESOPHAGOGASTRODUODENOSCOPY performed by Elvia Arango MD at [facility] ENDOSCOPY     OB History    Para Term  AB Living   3 3 3     3   SAB TAB Ectopic Molar Multiple Live Births             3      # Outcome Date GA Lbr Harish/2nd Weight Sex Delivery Anes PTL Lv   3 Term 26    F Vag-Spont   MICHAEL   2 Term 1985    M Vag-Spont   MICHAEL   1 Term 26    M Vag-Spont   MICHAEL     Social History     Socioeconomic History    Marital status:      Spouse name: Not on file    Number of children: Not on file    Years of education: Not on file    Highest education level: Not on file   Occupational History    Not on file   Tobacco Use    Smoking status: Never Smoker    Smokeless tobacco: Never Used   Vaping Use    Vaping Use: Never used   Substance and Sexual Activity    Alcohol use: Yes     Alcohol/week: 8.0 standard drinks     Types: 4 Cans of beer, 4 Standard drinks or equivalent per week     Comment: occasionally    Drug use: No    Sexual activity: Yes     Partners: Male   Other Topics Concern    Not on file   Social History Narrative    Not on file     Social Determinants of Health     Financial Resource Strain: Low Risk     Difficulty of Paying Living Expenses: Not hard at all   Food Insecurity: No Food Insecurity    Worried About Running Out of Food in the Last Year: Never true    Felecia of Food in the Last Year: Never true   Transportation Needs: No Transportation Needs    Lack of Transportation (Medical): No    Lack of Transportation (Non-Medical): No   Physical Activity:     Days of Exercise per Week:     Minutes of Exercise per Session:    Stress:     Feeling of Stress :    Social Connections:     Frequency of Communication with Friends and Family:     Frequency of Social Gatherings with Friends and Family:     Attends Mandaeism Services:     Active Member of Clubs or Organizations:     Attends Club or Organization Meetings:     Marital Status:    Intimate Partner Violence:     Fear of Current or Ex-Partner:     Emotionally Abused:     Physically Abused:     Sexually Abused:       Allergies   Allergen Reactions    Penicillins Hives    Sulfa Antibiotics Hives     Outpatient Medications Marked as Taking for the 5/25/21 encounter (Office Visit) with Alan Graham MD   Medication Sig Dispense Refill    rosuvastatin (CRESTOR) 10 MG tablet TAKE 1 TABLET BY MOUTH ONE TIME A DAY 90 tablet 0    omeprazole (PRILOSEC) 40 MG delayed release capsule 20 mg       valACYclovir (VALTREX) 1 g tablet TAKE TWO TABLETS BY MOUTH TWICE A DAY FOR 2 DOSES AS NEEDED 36 tablet 0    Turmeric (QC TUMERIC COMPLEX PO) Take 1 capsule by mouth      NONFORMULARY Elderberry syrup      vitamin B-12 (CYANOCOBALAMIN) 100 MCG tablet Take 50 mcg by mouth daily      Probiotic Product (PROBIOTIC-10 ULTIMATE PO) Take by mouth      Vitamin D (CHOLECALCIFEROL) 1000 UNITS CAPS capsule Take 1,000 Units by mouth daily. Family History   Problem Relation Age of Onset    High Blood Pressure Mother     High Cholesterol Mother     Depression Mother     Arthritis Father     Heart Disease Father     High Blood Pressure Father     High Cholesterol Father     Kidney Disease Father     Cancer Father         testicle    Heart Disease Maternal Grandfather     Heart Disease Paternal Grandfather     Heart Disease Brother         MI at 48    High Cholesterol Brother     High Cholesterol Sister     Rheum Arthritis Neg Hx     Osteoarthritis Neg Hx     Asthma Neg Hx     Breast Cancer Neg Hx     Diabetes Neg Hx     Heart Failure Neg Hx     Hypertension Neg Hx     Migraines Neg Hx     Ovarian Cancer Neg Hx     Rashes/Skin Problems Neg Hx     Seizures Neg Hx     Stroke Neg Hx     Thyroid Disease Neg Hx      /70 (Site: Right Upper Arm, Position: Sitting, Cuff Size: Large Adult)   Pulse 54   Resp 17   Ht 5' 10\" (1.778 m)   Wt 175 lb 12.8 oz (79.7 kg)   LMP 07/28/2012   BMI 25.22 kg/m²       Objective:   Physical Exam  Constitutional:       General: She is not in acute distress. Appearance: Normal appearance. She is well-developed and normal weight. She is not diaphoretic. HENT:      Head: Normocephalic and atraumatic. Nose: Nose normal.      Mouth/Throat:      Mouth: Mucous membranes are moist.      Pharynx: Oropharynx is clear. Eyes:      Pupils: Pupils are equal, round, and reactive to light. Neck:      Thyroid: No thyromegaly. Cardiovascular:      Rate and Rhythm: Normal rate and regular rhythm.       Heart sounds: Normal heart sounds. No murmur heard. No friction rub. No gallop. Pulmonary:      Effort: Pulmonary effort is normal. No respiratory distress. Breath sounds: Normal breath sounds. No wheezing or rales. Chest:      Breasts:         Right: Normal. No swelling, bleeding, inverted nipple, mass, nipple discharge, skin change or tenderness. Left: Normal. No swelling, bleeding, inverted nipple, mass, nipple discharge, skin change or tenderness. Abdominal:      General: Abdomen is flat. Bowel sounds are normal. There is no distension. Palpations: Abdomen is soft. There is no hepatomegaly or mass. Tenderness: There is no abdominal tenderness. There is no guarding or rebound. Hernia: No hernia is present. There is no hernia in the left inguinal area. Genitourinary:     General: Normal vulva. Exam position: Lithotomy position. Pubic Area: No rash. Labia:         Right: No rash, tenderness, lesion or injury. Left: No rash, tenderness, lesion or injury. Urethra: No prolapse, urethral pain, urethral swelling or urethral lesion. Vagina: Normal. No signs of injury and foreign body. No vaginal discharge, erythema, tenderness or bleeding. Cervix: No cervical motion tenderness, discharge, friability, lesion, erythema, cervical bleeding or eversion. Uterus: Not deviated, not enlarged, not fixed, not tender and no uterine prolapse. Adnexa:         Right: No mass, tenderness or fullness. Left: No mass, tenderness or fullness. Rectum: Normal. Guaiac result negative. No mass, tenderness, anal fissure, external hemorrhoid or internal hemorrhoid. Normal anal tone. Comments: Normal urethral meatus, nl urethra, nl bladder. Musculoskeletal:         General: No tenderness. Normal range of motion. Cervical back: Normal range of motion and neck supple. No rigidity. Lymphadenopathy:      Cervical: No cervical adenopathy.       Lower Body: No

## 2021-07-01 DIAGNOSIS — E78.2 MIXED HYPERLIPIDEMIA: ICD-10-CM

## 2021-07-02 RX ORDER — ROSUVASTATIN CALCIUM 10 MG/1
TABLET, COATED ORAL
Qty: 90 TABLET | Refills: 1 | Status: SHIPPED | OUTPATIENT
Start: 2021-07-02 | End: 2022-01-11

## 2021-07-15 LAB
CHOLESTEROL, TOTAL: 170 MG/DL (ref 0–199)
GLUCOSE BLD-MCNC: 81 MG/DL (ref 70–99)
HDLC SERPL-MCNC: 39 MG/DL (ref 40–60)
LDL CHOLESTEROL CALCULATED: 105 MG/DL
TRIGL SERPL-MCNC: 129 MG/DL (ref 0–150)

## 2021-08-04 ENCOUNTER — OFFICE VISIT (OUTPATIENT)
Dept: FAMILY MEDICINE CLINIC | Age: 61
End: 2021-08-04
Payer: COMMERCIAL

## 2021-08-04 VITALS
HEIGHT: 70 IN | OXYGEN SATURATION: 97 % | SYSTOLIC BLOOD PRESSURE: 108 MMHG | HEART RATE: 62 BPM | BODY MASS INDEX: 24.77 KG/M2 | WEIGHT: 173 LBS | DIASTOLIC BLOOD PRESSURE: 64 MMHG

## 2021-08-04 DIAGNOSIS — Z01.818 PREOP EXAMINATION: Primary | ICD-10-CM

## 2021-08-04 DIAGNOSIS — E78.2 MIXED HYPERLIPIDEMIA: ICD-10-CM

## 2021-08-04 PROCEDURE — 99242 OFF/OP CONSLTJ NEW/EST SF 20: CPT | Performed by: FAMILY MEDICINE

## 2021-08-04 ASSESSMENT — ENCOUNTER SYMPTOMS: RESPIRATORY NEGATIVE: 1

## 2021-08-04 NOTE — PROGRESS NOTES
Genevieve Dunn (:  1960) is a 64 y.o. female,Established patient, here for evaluation of the following chief complaint(s):  Pre-op Exam (2021, Right eye surgery to repair macular hole, Dr. Armen GUERRERO Si)         ASSESSMENT/PLAN:  Swati Powell was seen today for pre-op exam.    Diagnoses and all orders for this visit:    Preop examination  Medically patient is at acceptable risk to undergo planned surgery. Mixed hyperlipidemia  Stable on statin       No follow-ups on file. Subjective   SUBJECTIVE/OBJECTIVE:  HPI   Pt is a of 64 y.o. female comes in today with   Chief Complaint   Patient presents with    Pre-op Exam     2021, Right eye surgery to repair macular hole, Dr. Armen GUERRERO Si     No personal or family history of adverse reaction to anesthesia or bleeding tendency. Feeling well recently  Good exercise tolerance    Vitals:    21 0815   BP: 108/64   Site: Left Upper Arm   Position: Sitting   Cuff Size: Medium Adult   Pulse: 62   SpO2: 97%   Weight: 173 lb (78.5 kg)   Height: 5' 10\" (1.778 m)     Allergies   Allergen Reactions    Penicillins Hives    Sulfa Antibiotics Hives       Current Outpatient Medications on File Prior to Visit   Medication Sig Dispense Refill    rosuvastatin (CRESTOR) 10 MG tablet TAKE 1 TABLET BY MOUTH ONE TIME A DAY 90 tablet 1    omeprazole (PRILOSEC) 40 MG delayed release capsule 20 mg       valACYclovir (VALTREX) 1 g tablet TAKE TWO TABLETS BY MOUTH TWICE A DAY FOR 2 DOSES AS NEEDED 36 tablet 0    Turmeric (QC TUMERIC COMPLEX PO) Take 1 capsule by mouth      NONFORMULARY Elderberry syrup      vitamin B-12 (CYANOCOBALAMIN) 100 MCG tablet Take 50 mcg by mouth daily      Probiotic Product (PROBIOTIC-10 ULTIMATE PO) Take by mouth      Vitamin D (CHOLECALCIFEROL) 1000 UNITS CAPS capsule Take 1,000 Units by mouth daily. No current facility-administered medications on file prior to visit.        Past Medical History:   Diagnosis Date    Allergic rhinitis     Chicken pox     Chondromalacia patellae, left knee     Dyspareunia     HPV (human papilloma virus) anogenital infection     Hyperlipidemia     PONV (postoperative nausea and vomiting)     Recurrent cold sores     STD (sexually transmitted disease)     HPV       Past Surgical History:   Procedure Laterality Date    COLONOSCOPY  8/28/2020    COLONOSCOPY WITH BIOPSY performed by Benito Senior MD at [address] [place] [address] ARTHROSCOPY Left 07/26/2018    LEEP      SKIN TAG REMOVAL      TOOTH EXTRACTION      UPPER GASTROINTESTINAL ENDOSCOPY N/A 8/28/2020    EGD BIOPSY performed by Benito Senior MD at [address] N/A 8/28/2020    EGD Julio Sophie performed by Benito Senior MD at [address] N/A 12/4/2020    ESOPHAGOGASTRODUODENOSCOPY performed by Gomez Larsen MD at [address] History     Socioeconomic History    Marital status:      Spouse name: None    Number of children: None    Years of education: None    Highest education level: None   Occupational History    None   Tobacco Use    Smoking status: Never Smoker    Smokeless tobacco: Never Used   Vaping Use    Vaping Use: Never used   Substance and Sexual Activity    Alcohol use:  Yes     Alcohol/week: 8.0 standard drinks     Types: 4 Cans of beer, 4 Standard drinks or equivalent per week     Comment: occasionally    Drug use: No    Sexual activity: Yes     Partners: Male   Other Topics Concern    None   Social History Narrative    None     Social Determinants of Health     Financial Resource Strain: Low Risk     Difficulty of Paying Living Expenses: Not hard at all   Food Insecurity: No Food Insecurity    Worried About Running Out of Food in the Last Year: Never true    Felecia of Food in the Last Year: Never true   Transportation Needs: No Transportation Needs    Lack of Transportation (Medical): No    Lack of Transportation (Non-Medical): No   Physical Activity:     Days of Exercise per Week:     Minutes of Exercise per Session:    Stress:     Feeling of Stress :    Social Connections:     Frequency of Communication with Friends and Family:     Frequency of Social Gatherings with Friends and Family:     Attends Jew Services:     Active Member of Clubs or Organizations:     Attends Club or Organization Meetings:     Marital Status:    Intimate Partner Violence:     Fear of Current or Ex-Partner:     Emotionally Abused:     Physically Abused:     Sexually Abused:        Social History     Substance and Sexual Activity   Drug Use No       Family History   Problem Relation Age of Onset    High Blood Pressure Mother     High Cholesterol Mother     Depression Mother     Arthritis Father     Heart Disease Father     High Blood Pressure Father     High Cholesterol Father     Kidney Disease Father     Cancer Father         testicle    Heart Disease Maternal Grandfather     Heart Disease Paternal Grandfather     Heart Disease Brother         MI at 48    High Cholesterol Brother     High Cholesterol Sister     Rheum Arthritis Neg Hx     Osteoarthritis Neg Hx     Asthma Neg Hx     Breast Cancer Neg Hx     Diabetes Neg Hx     Heart Failure Neg Hx     Hypertension Neg Hx     Migraines Neg Hx     Ovarian Cancer Neg Hx     Rashes/Skin Problems Neg Hx     Seizures Neg Hx     Stroke Neg Hx     Thyroid Disease Neg Hx         Review of Systems   Constitutional: Negative. Respiratory: Negative. Cardiovascular: Negative. Hematological: Does not bruise/bleed easily. Objective   Physical Exam  Constitutional:       General: She is not in acute distress. Appearance: Normal appearance. She is well-developed. She is not diaphoretic. HENT:      Head: Normocephalic and atraumatic. Eyes:      General: No scleral icterus.   Neck:      Thyroid: No thyroid mass or thyromegaly. Trachea: No tracheal deviation. Cardiovascular:      Rate and Rhythm: Normal rate and regular rhythm. Heart sounds: Normal heart sounds. No murmur heard. Pulmonary:      Effort: Pulmonary effort is normal.      Breath sounds: Normal breath sounds. Musculoskeletal:      Cervical back: Normal range of motion and neck supple. Lymphadenopathy:      Head:      Right side of head: No submandibular adenopathy. Left side of head: No submandibular adenopathy. Cervical: No cervical adenopathy. Skin:     General: Skin is warm and dry. Findings: No rash. Neurological:      Mental Status: She is alert and oriented to person, place, and time. Cranial Nerves: No cranial nerve deficit. Psychiatric:         Behavior: Behavior normal.         Thought Content: Thought content normal.         Judgment: Judgment normal.              An electronic signature was used to authenticate this note.     --Dayana Mensah MD

## 2021-09-30 ENCOUNTER — PATIENT MESSAGE (OUTPATIENT)
Dept: DERMATOLOGY | Age: 61
End: 2021-09-30

## 2021-09-30 NOTE — TELEPHONE ENCOUNTER
From: Aida Condon  To: Linus Javier MD  Sent: 9/30/2021 9:24 AM EDT  Subject: Non-Urgent Medical Question    Dr. Freddy Alfaro    Previously you tried to remove a lipoma but it has grown back. During my last visit, you said I should go to a surgeon to remove it. Can you please give me a name of a surgeon you recommend? They would need to be in network and a [address].     Thank you,  Violet Hare

## 2021-11-02 ENCOUNTER — OFFICE VISIT (OUTPATIENT)
Dept: ORTHOPEDIC SURGERY | Age: 61
End: 2021-11-02
Payer: COMMERCIAL

## 2021-11-02 VITALS — WEIGHT: 173 LBS | BODY MASS INDEX: 24.82 KG/M2

## 2021-11-02 DIAGNOSIS — M25.512 LEFT SHOULDER PAIN, UNSPECIFIED CHRONICITY: Primary | ICD-10-CM

## 2021-11-02 PROCEDURE — 99203 OFFICE O/P NEW LOW 30 MIN: CPT | Performed by: ORTHOPAEDIC SURGERY

## 2021-11-02 NOTE — PROGRESS NOTES
Chief Complaint    New Patient (Left Shoulder Eval)      History of Present Illness:  Weston Killian is a pleasant,  64 y.o. female here today for evaluation of left shoulder. she has history of recurrent shoulder dislocations. She has her first dislocation at age of 19's while playing volleyball. She treated conservatively for her dislocation. After that she got other 3 dislocations and all of them with abduction external rotation movements in different occasions. All of her dislocations were between 21and 27years old. She did not get any dislocation after that. About a year ago, on May 2020 she got another shoulder dislocation while she was trying to hold object behind her back with abduction and external rotation position. She had her shoulder reduced by her . Since that time she had sharp pain at her left shoulder. Sometimes the pain is 7/10 in severity and it has a sharp quality. She has tried physical therapy and pain medication with no improvement. She still has a weakness associated with her pain. The patient denies numbness, weakness or other neurological symptoms. Medical History:    Review of Systems   Eyes:        Eye impairment   Gastrointestinal:        Hiatal hernia   Musculoskeletal:        Joint pain - shoulder   All other systems reviewed and are negative. Patient's medications, allergies, past medical, surgical, social and family histories were reviewed and updated as appropriate.     Past Medical History:   Diagnosis Date    Allergic rhinitis     Chicken pox     Chondromalacia patellae, left knee     Dyspareunia     HPV (human papilloma virus) anogenital infection     Hyperlipidemia     PONV (postoperative nausea and vomiting)     Recurrent cold sores     STD (sexually transmitted disease)     HPV      Social History     Socioeconomic History    Marital status:      Spouse name: Not on file    Number of children: Not on file    Years of education: Not on file    Highest education level: Not on file   Occupational History    Not on file   Tobacco Use    Smoking status: Never Smoker    Smokeless tobacco: Never Used   Vaping Use    Vaping Use: Never used   Substance and Sexual Activity    Alcohol use: Yes     Alcohol/week: 8.0 standard drinks     Types: 4 Cans of beer, 4 Standard drinks or equivalent per week     Comment: occasionally    Drug use: No    Sexual activity: Yes     Partners: Male   Other Topics Concern    Not on file   Social History Narrative    Not on file     Social Determinants of Health     Financial Resource Strain: Low Risk     Difficulty of Paying Living Expenses: Not hard at all   Food Insecurity: No Food Insecurity    Worried About Running Out of Food in the Last Year: Never true    Felecia of Food in the Last Year: Never true   Transportation Needs: No Transportation Needs    Lack of Transportation (Medical): No    Lack of Transportation (Non-Medical): No   Physical Activity:     Days of Exercise per Week:     Minutes of Exercise per Session:    Stress:     Feeling of Stress :    Social Connections:     Frequency of Communication with Friends and Family:     Frequency of Social Gatherings with Friends and Family:     Attends Mosque Services:     Active Member of Clubs or Organizations:     Attends Club or Organization Meetings:     Marital Status:    Intimate Partner Violence:     Fear of Current or Ex-Partner:     Emotionally Abused:     Physically Abused:     Sexually Abused:         Allergies   Allergen Reactions    Penicillins Hives    Sulfa Antibiotics Hives     Current Outpatient Medications on File Prior to Visit   Medication Sig Dispense Refill    rosuvastatin (CRESTOR) 10 MG tablet TAKE 1 TABLET BY MOUTH ONE TIME A DAY 90 tablet 1    omeprazole (PRILOSEC) 40 MG delayed release capsule 20 mg       valACYclovir (VALTREX) 1 g tablet TAKE TWO TABLETS BY MOUTH TWICE A DAY FOR 2 DOSES AS NEEDED 36 tablet 0  Turmeric (QC TUMERIC COMPLEX PO) Take 1 capsule by mouth      NONFORMULARY Elderberry syrup      vitamin B-12 (CYANOCOBALAMIN) 100 MCG tablet Take 50 mcg by mouth daily      Probiotic Product (PROBIOTIC-10 ULTIMATE PO) Take by mouth      Vitamin D (CHOLECALCIFEROL) 1000 UNITS CAPS capsule Take 1,000 Units by mouth daily. No current facility-administered medications on file prior to visit. Review of Systems  A 14 point review of systems was completed by the patient on 11/2/2021 and is available in the media section of the scanned medical record and was reviewed on 11/2/2021. The review is negative with the exception of those things mentioned in the HPI and Past Medical History    Vital Signs:  Vitals:       General Exam:   Neurological: The patient has good coordination. There is no weakness or sensory deficit. Left shoulder Examination:    Inspection:  No skin lesions, no deformity, no swelling. Palpation: No tenderness at the [facility] joint. No tenderness at the bicipital groove. Active Range of Motion: Forward Elevation 170, Abduction 160, External Rotation 40, Internal Rotation L3    Passive Range of Motion: Forward Elevation 170, Abduction 160, External Rotation 40, Internal Rotation deferred    Strength:  External Rotation 4+/5, Internal Rotation 4+/5, Champagne Toast 4+/5 with pain, Supraspinatus 4+/5    Special Tests: Negative Jocelyn's, negative Hawkin's, No Emmanuel deformity. Positive apprehension test.  Negative relocation test.    Neurovascular: Palpable radial pulse, normal sensation in the median, ulnar, radial nerve distributions      Self assessment questionnaires were completed today. Radiology:     Plain radiographs of the left shoulder, comprising 3 views: AP, Scapular Y and Axillary lateral were  obtained and reviewed in the office:    Impression: No glenohumeral arthritis. No fractures or dislocation         Assessment :  David Denson is a pleasant 64 y.o. female with pain related to left shoulder recurrent instability. With the possibility of labral injury versus rotator cuff tear        Impression:  Encounter Diagnosis   Name Primary?  Left shoulder pain, unspecified chronicity Yes       Office Procedures:  Orders Placed This Encounter   Procedures    XR SHOULDER LEFT (MIN 2 VIEWS)     In waiting room     Standing Status:   Future     Number of Occurrences:   1     Standing Expiration Date:   11/1/2022     Order Specific Question:   Reason for exam:     Answer:   PAIN       Treatment Plan:  Pertinent imaging was reviewed. The etiology, natural history, and treatment options for the disorder were discussed. The roles of activity modifications, medications, cryotherapy and heat, injections, physical therapy, and surgical interventions were all described to the patient and questions were answered. Patient has left shoulder recurrent instability. Her last dislocation was about a year ago, after she had no dislocation since she was 27years of age. She is very active doing golf swimming and workouts. She never had any issues with her shoulder before the last incident. Although she tried physiotherapy for her shoulder for a year now. She still have pain and sometimes catching feeling in her left shoulder. Based on our clinical examination she may had some sort of labral injury versus rotator cuff tear. We need to have an MRI on her left shoulder looking for rotator cuff tear versus labral injury. At the same time we recommend her to continue with her home-based physiotherapy. Wilman Willis will follow up in after the MRI  and/or as needed. They were in agreement with this plan and all questions were answered to the patient's satisfaction. They were encouraged to call with any questions.      Zahra Barragan MD  Clinical Fellow at [place] [id number]    2:37 PM  11/2/2021      During this examination, IZahra MD functioned as a scribe for Dr. Eliud Alcantara. This dictation was performed with a verbal recognition program (DRAGON) and it was checked for errors. It is possible that there are still dictated errors within this office note. If so, please bring any errors to my attention for an addendum. All efforts were made to ensure that this office note is accurate.  ______________  I was physically present and personally supervised the Orthopaedic Sports Medicine Fellow in the evaluation and development of a treatment plan for this patient. I personally interviewed the patient and performed a physical examination. In addition, I discussed the patient's condition and treatment options with them. I have also reviewed and agree with the past medical, family and social history unless otherwise noted. All of the patient's questions were answered. Ender Telles MD, PhD  11/2/2021

## 2021-11-02 NOTE — PROGRESS NOTES
Review of Systems   Eyes:        Eye impairment   Gastrointestinal:        Hiatal hernia   Musculoskeletal:        Joint pain - shoulder   All other systems reviewed and are negative.

## 2021-11-08 DIAGNOSIS — B00.1 RECURRENT COLD SORES: ICD-10-CM

## 2021-11-08 RX ORDER — VALACYCLOVIR HYDROCHLORIDE 1 G/1
TABLET, FILM COATED ORAL
Qty: 36 TABLET | Refills: 0 | Status: SHIPPED | OUTPATIENT
Start: 2021-11-08 | End: 2022-08-23

## 2021-11-10 ENCOUNTER — OFFICE VISIT (OUTPATIENT)
Dept: ORTHOPEDIC SURGERY | Age: 61
End: 2021-11-10
Payer: COMMERCIAL

## 2021-11-10 VITALS — BODY MASS INDEX: 24.77 KG/M2 | HEIGHT: 70 IN | WEIGHT: 173 LBS

## 2021-11-10 DIAGNOSIS — M75.02 ADHESIVE CAPSULITIS OF LEFT SHOULDER: ICD-10-CM

## 2021-11-10 DIAGNOSIS — M25.512 LEFT SHOULDER PAIN, UNSPECIFIED CHRONICITY: Primary | ICD-10-CM

## 2021-11-10 PROCEDURE — 99213 OFFICE O/P EST LOW 20 MIN: CPT | Performed by: ORTHOPAEDIC SURGERY

## 2021-11-10 RX ORDER — OMEPRAZOLE 20 MG/1
20 CAPSULE, DELAYED RELEASE ORAL DAILY
COMMUNITY
Start: 2021-11-06 | End: 2022-05-27 | Stop reason: SDUPTHER

## 2021-11-10 RX ORDER — MELOXICAM 15 MG/1
15 TABLET ORAL DAILY PRN
Qty: 30 TABLET | Refills: 5 | Status: SHIPPED | OUTPATIENT
Start: 2021-11-10 | End: 2022-04-13

## 2021-11-10 NOTE — PROGRESS NOTES
Chief Complaint    Follow-up (f/u left shoulder mri)      History of Present Illness:  Jen Panda is a pleasant,  64 y.o. female here today for evaluation of left shoulder. she has history of recurrent shoulder dislocations. She has her first dislocation at age of 19's while playing volleyball. She treated conservatively for her dislocation. After that she got other 3 dislocations and all of them with abduction external rotation movements in different occasions. All of her dislocations were between 21and 27years old. She did not get any dislocation after that. About a year ago, on May 2020 she got another shoulder dislocation while she was trying to hold object behind her back with abduction and external rotation position. She had her shoulder reduced by her . Since that time she had sharp pain at her left shoulder. Patient here today to review her MRI results. The patient denies numbness, weakness or other neurological symptoms. Medical History:    Patient has had no medical changes since last evaluated    Patient's medications, allergies, past medical, surgical, social and family histories were reviewed and updated as appropriate. Past Medical History:   Diagnosis Date    Allergic rhinitis     Chicken pox     Chondromalacia patellae, left knee     Dyspareunia     HPV (human papilloma virus) anogenital infection     Hyperlipidemia     PONV (postoperative nausea and vomiting)     Recurrent cold sores     STD (sexually transmitted disease)     HPV      Social History     Socioeconomic History    Marital status:      Spouse name: Not on file    Number of children: Not on file    Years of education: Not on file    Highest education level: Not on file   Occupational History    Not on file   Tobacco Use    Smoking status: Never Smoker    Smokeless tobacco: Never Used   Vaping Use    Vaping Use: Never used   Substance and Sexual Activity    Alcohol use:  Yes Alcohol/week: 8.0 standard drinks     Types: 4 Cans of beer, 4 Standard drinks or equivalent per week     Comment: occasionally    Drug use: No    Sexual activity: Yes     Partners: Male   Other Topics Concern    Not on file   Social History Narrative    Not on file     Social Determinants of Health     Financial Resource Strain: Low Risk     Difficulty of Paying Living Expenses: Not hard at all   Food Insecurity: No Food Insecurity    Worried About Running Out of Food in the Last Year: Never true    [address] in the Last Year: Never true   Transportation Needs: No Transportation Needs    Lack of Transportation (Medical): No    Lack of Transportation (Non-Medical):  No   Physical Activity:     Days of Exercise per Week: Not on file    Minutes of Exercise per Session: Not on file   Stress:     Feeling of Stress : Not on file   Social Connections:     Frequency of Communication with Friends and Family: Not on file    Frequency of Social Gatherings with Friends and Family: Not on file    Attends Anabaptism Services: Not on file    Active Member of [address] [place] [address] or Organizations: Not on file    Attends Club or Organization Meetings: Not on file    Marital Status: Not on file   Intimate Partner Violence:     Fear of Current or Ex-Partner: Not on file    Emotionally Abused: Not on file    Physically Abused: Not on file    Sexually Abused: Not on file   Housing Stability:     Unable to Pay for Housing in the Last Year: Not on file    Number of [place] in the Last Year: Not on file    Unstable Housing in the Last Year: Not on file       Allergies   Allergen Reactions    Penicillins Hives    Sulfa Antibiotics Hives     Current Outpatient Medications on File Prior to Visit   Medication Sig Dispense Refill    omeprazole (PRILOSEC) 20 MG delayed release capsule       valACYclovir (VALTREX) 1 g tablet TAKE TWO TABLETS BY MOUTH TWICE A DAY FOR 2 DOSES AS NEEDED 36 tablet 0    rosuvastatin (CRESTOR) 10 MG tablet TAKE 1 TABLET BY MOUTH ONE TIME A DAY 90 tablet 1    Turmeric (QC TUMERIC COMPLEX PO) Take 1 capsule by mouth      NONFORMULARY Elderberry syrup      vitamin B-12 (CYANOCOBALAMIN) 100 MCG tablet Take 50 mcg by mouth daily      Probiotic Product (PROBIOTIC-10 ULTIMATE PO) Take by mouth      Vitamin D (CHOLECALCIFEROL) 1000 UNITS CAPS capsule Take 1,000 Units by mouth daily. No current facility-administered medications on file prior to visit. Review of Systems  A 14 point review of systems was completed by the patient on 11/10/2021 and is available in the media section of the scanned medical record and was reviewed on 11/10/2021. The review is negative with the exception of those things mentioned in the HPI and Past Medical History    Vital Signs: There were no vitals filed for this visit. General Exam:   Neurological: The patient has good coordination. There is no weakness or sensory deficit. Left shoulder Examination:    Inspection:  No skin lesions, no deformity, no swelling. Palpation: No tenderness at the [facility] joint. No tenderness at the bicipital groove. Active Range of Motion: Forward Elevation 170, Abduction 160, External Rotation in supine position 40 versus 90 on the other shoulder, Internal Rotation L3    Passive Range of Motion: Forward Elevation 170, Abduction 160, External Rotation in supine position 40 versus 90 on the other shoulder, Internal Rotation in supine position 15 versus 40 in the other shoulder    Strength:  External Rotation 4+/5, Internal Rotation 4+/5, Champagne Toast 4+/5 with pain, Supraspinatus 4+/5    Special Tests: Negative Jocelyn's, negative Hawkin's, No Emmanuel deformity. Positive apprehension test.  Negative relocation test.    Neurovascular: Palpable radial pulse, normal sensation in the median, ulnar, radial nerve distributions      Self assessment questionnaires were completed today.       Radiology:     No Radiography taken at office today     MRI left shoulder from 11/10/21 was reviewed and interpreted in the office today. No rotator cuff injury. Anteroinferior labrum tear with shallow Hill-Sachs impaction fracture.   + Adhesive capsulitis          Assessment :  Prasanna Rojas is a pleasant 64 y.o. female with pain related to left shoulder due to adhesive capsulitis. She has a history of instability but this is not her current shoulder problem. A trial of conservative treatment is most appropriate. Impression:  Encounter Diagnoses   Name Primary?  Left shoulder pain, unspecified chronicity Yes    Adhesive capsulitis of left shoulder        Office Procedures:  Orders Placed This Encounter   Procedures    [facility]     Referral Priority:   Routine     Referral Type:   Eval and Treat     Referral Reason:   Specialty Services Required     Requested Specialty:   Physical Therapy     Number of Visits Requested:   1       Treatment Plan: We had a thorough discussion with Prasanna Rojas today and we reviewed the MRI. Even though the patient has history of recurrent instability, she developed mild adhesive capsulitis based on her MRI and her clinical examination. Her sharp pain mostly related to her capsulitis. At this time she will not need any surgical intervention. Her treatment will be based on physical therapy, nonsteroidal anti-inflammatory medication and possible steroid injection in the future. We recommend that Prasanna Rojas start a course of supervised physical therapy for adhesive capsulitis. A new physical therapy letter was documented in EPIC today . We will see Riana Edwards back in 4 weeks and/or as needed. All questions were answered to patient's satisfaction and She was encouraged to call with any further questions or concerns. Lucia Gauthier is in agreement with this plan.     Adriane Quintanilla MD  Clinical Fellow at [facility]    9:31 AM  11/10/2021      During

## 2021-11-16 ENCOUNTER — HOSPITAL ENCOUNTER (OUTPATIENT)
Dept: PHYSICAL THERAPY | Age: 61
Setting detail: THERAPIES SERIES
Discharge: HOME | End: 2021-11-16
Payer: COMMERCIAL

## 2021-11-16 PROCEDURE — 97161 PT EVAL LOW COMPLEX 20 MIN: CPT | Performed by: PHYSICAL THERAPIST

## 2021-11-16 PROCEDURE — 97110 THERAPEUTIC EXERCISES: CPT | Performed by: PHYSICAL THERAPIST

## 2021-11-16 NOTE — FLOWSHEET NOTE
[facility]  Orthopaedics and Sports Rehabilitation, [facility]    Physical Therapy Daily Treatment Note  Date:  2021    Patient Name:  Amanda Jewell    :  1960  MRN: [id number]  Restrictions/Precautions:    Medical/Treatment Diagnosis Information:  · Diagnosis: M25.512 (ICD-10-CM) - Left shoulder pain, unspecified obtlvgsmseP25.02 (ICD-10-CM) - Adhesive capsulitis of left shoulder  · Treatment Diagnosis: M25.612, M93.3  Insurance/Certification information:  PT Insurance Information: PT BENEFITS  FACILITY/ MEDICAL MUTUAL/ EFFECTIVE 2020/ ACTIVE/ DED 1000 MET 1000/ PAYS 90%/ OOP 2000 MET 2000/ NO VISIT LIMIT MED NEC/ 0 AUTH/ ALL CODES BILLABLE CODE 80565 NOT COVERED/ PRESERVICE/ REF# [id number]/ 11- PAG  Physician Information:  Referring Practitioner: Chandana Durbin MD  Has the plan of care been signed (Y/N):        []  Yes  [x]  No     Date of Patient follow up with Physician: 21 Ionfernanda Mendes      Is this a Progress Report:     []  Yes  [x]  No        If Yes:  Date Range for reporting period:  Beginning  Ending    Progress report will be due (10 Rx or 30 days whichever is less):        Recertification will be due (POC Duration  / 90 days whichever is less): 22         Visit # Insurance Allowable Auth Required   IE NO LIMIT []  Yes []  No        Functional Scale:    Date assessed:  UEFI 76/80=95% (5% deficit)  21    Latex Allergy:  [x]NO      []YES  Preferred Language for Healthcare:   [x]English       []other:    Pain level:  3-4/10     SUBJECTIVE:  See eval    OBJECTIVE:              ROM PROM AROM  Comment     L R L R     Flexion     140 WNL     Abduction     135 WNL     ER     60 WNL     IR     60 WNL           Strength L R Comment   Flexion 4/5       Abduction 4/5       ER 4/5       IR 4-/5       Upper Trap         Lower Trap 4-/5       Mid Trap 4-/5       Biceps 4/5       Triceps 4/5          Special Tests Results/Comment   MARISSA -   Bienvenido - Neers -   Painful Arc -   Drop Arm -   ER Lag Sign -   Empty Can / Jocelyn's -   [address] body adduction -   Apprehension/Relocation +   Hornblower's -   Bicep Load II Not assessed         Reflexes/Sensation:               [x]? Dermatomes/Myotomes intact               []? Reflexes equal and normal bilaterally               []? Other:     Joint mobility:               []? Normal               [x]? Hypo              []? Hyper     Palpation: no TTP around [facility] joint     Functional Mobility/Transfers: Independent     Posture: Rounded shoulder    RESTRICTIONS/PRECAUTIONS:     Exercises/Interventions:     Exercise/Equipment Resistance/Repetitions Other comments   Stretching/PROM     Lat stretch 3x30\"  Seated, arms on table for leverage   Cross body ADD 3x30\"    BB IR 3x30\"    Doorway 3x30\"              Isometrics     Retraction          Weight shift     Flexion     Abduction     External Rotation     Internal Rotation     Biceps     Triceps          PRE's     Flexion     Abduction     External Rotation 3x10 2# sidelying   Internal Rotation     Shrugs     EXT     Reverse Flys     Serratus     Horizontal Abd with ER     Biceps     Triceps     Retraction 10x10\" prone         Cable Column/Theraband     External Rotation     Internal Rotation     Shrugs     Lats     Ext     Flex     Scapular Retraction     BIC     TRIC     PNF          Dynamic Stability          Plyoback          Manual interventions                 Plan for next session: progress as tolerated, advance prone strength program    Therapeutic Exercise and NMR EXR  [x] (89337) Provided verbal/tactile cueing for activities related to strengthening, flexibility, endurance, ROM  for improvements in scapular, scapulothoracic and UE control with self care, reaching, carrying, lifting, house/yardwork, driving/computer work.    [] (30750) Provided verbal/tactile cueing for activities related to improving balance, coordination, kinesthetic sense, posture, motor skill, proprioception  to assist with  scapular, scapulothoracic and UE control with self care, reaching, carrying, lifting, house/yardwork, driving/computer work. Therapeutic Activities:    [] (59026 or 82284) Provided verbal/tactile cueing for activities related to improving balance, coordination, kinesthetic sense, posture, motor skill, proprioception and motor activation to allow for proper function of scapular, scapulothoracic and UE control with self care, carrying, lifting, driving/computer work. Home Exercise Program:   Access Code: Buck Archie: [email or web address]  Date: 11/16/2021  Prepared by: Yazmin Gallop    Exercises  · Prone Chest Stretch on Chair - 1 x daily - 7 x weekly - 1 sets - 3 reps - 30 second hold  · Standing Shoulder Internal Rotation Stretch with Hands Behind Back - 1 x daily - 7 x weekly - 1 sets - 3 reps - 30 second hold  · Standing Shoulder Posterior Capsule Stretch - 1 x daily - 7 x weekly - 1 sets - 3 reps - 30 second hold  · Doorway Pec Stretch at 60 Elevation - 1 x daily - 7 x weekly - 1 sets - 3 reps - 30 second hold  · Prone Scapular Retraction - 1 x daily - 7 x weekly - 1 sets - 10 reps - 10 second hold  · Sidelying Shoulder ER with Towel and Dumbbell - 1 x daily - 7 x weekly - 1 sets - 10 reps     [x] (95105) Reviewed/Progressed HEP activities related to strengthening, flexibility, endurance, ROM of scapular, scapulothoracic and UE control with self care, reaching, carrying, lifting, house/yardwork, driving/computer work  [] (73209) Reviewed/Progressed HEP activities related to improving balance, coordination, kinesthetic sense, posture, motor skill, proprioception of scapular, scapulothoracic and UE control with self care, reaching, carrying, lifting, house/yardwork, driving/computer work      Manual Treatments:    [] (96116) Provided manual therapy to mobilize soft tissue/joints of cervical/CT, scapular GHJ and UE for the purpose of modulating pain, promoting relaxation,  increasing ROM, reducing/eliminating soft tissue swelling/inflammation/restriction, improving soft tissue extensibility and allowing for proper ROM for normal function with self care, reaching, carrying, lifting, house/yardwork, driving/computer work    Modalities:   None    Charges:  Timed Code Treatment Minutes: 30   Total Treatment Minutes: 50   Time in: 8:45  Time out: 9:36    [x] EVAL (LOW) 44259 (typically 20 minutes face-to-face)  [] EVAL (MOD) 45225 (typically 30 minutes face-to-face)  [] EVAL (HIGH) 27042 (typically 45 minutes face-to-face)  [] RE-EVAL     [x] ES(24400) x2     [] IONTO  [] NMR (70525) x     [] VASO  [] Manual (60751) x      [] Other:  [] TA x      [] Mech Traction (48223)  [] ES(attended) (44828)      [] ES (un) (24758):     GOALS:  Patient stated goal: decrease stiffness, improve strength, return to golf    [x]? Progressing: []? Met: []? Not Met: []? Adjusted     Therapist goals for Patient:   Short Term Goals: To be achieved in: 2-4 weeks 11/30/21-12/14/21  1. Independent in HEP and progression per patient tolerance, in order to prevent re-injury. []? Progressing: []? Met: []? Not Met: []? Adjusted   2. Patient will have a decrease in pain to facilitate improvement in movement, function, and ADLs as indicated by Functional Deficits. []? Progressing: []? Met: []? Not Met: []? Adjusted     3. Patient will demonstrate 10-15 deg improvement in AROM flex/abd/ER for improved functional mobility. []? Progressing: []? Met: []? Not Met: []? Adjusted      Long Term Goals: To be achieved in: 8 weeks 1/11/22  1. Disability index score of 0% for the UEFS to assist with reaching prior level of function. []? Progressing: []? Met: []? Not Met: []? Adjusted  2. Patient will demonstrate increased AROM to 170 flex/abd and 80 ER or greater to allow for proper joint functioning as indicated by patients Functional Deficits. []? Progressing: []?  Met: []? Not Met: []? Adjusted  3. Patient will demonstrate an increase in strength to 4+/5 or greater to allow for proper functional mobility as indicated by patients Functional Deficits. []? Progressing: []? Met: []? Not Met: []? Adjusted  4. Patient will return to ADLs, IADLs and functional activities without increased symptoms or restriction. []? Progressing: []? Met: []? Not Met: []? Adjusted  5. Patient will tolerate sleeping on L side without increased symptoms for improved sleeping tolerance. []? Progressing: []? Met: []? Not Met: []? Adjusted    Overall Progression Towards Functional goals/ Treatment Progress Update:  [] Patient is progressing as expected towards functional goals listed. [] Progression is slowed due to complexities/Impairments listed. [] Progression has been slowed due to co-morbidities. [x] Plan just implemented, too soon to assess goals progression <30days   [] Goals require adjustment due to lack of progress  [] Patient is not progressing as expected and requires additional follow up with physician  [] Other    Prognosis for POC: [x] Good [] Fair  [] Poor      Patient requires continued skilled intervention: [x] Yes  [] No    Treatment/Activity Tolerance:  [x] Patient able to complete treatment  [] Patient limited by fatigue  [] Patient limited by pain     [] Patient limited by other medical complications  [] Other:                   PLAN: See eval  [] Continue per plan of care [] Alter current plan (see comments above)  [x] Plan of care initiated [] Hold pending MD visit [] Discharge      Electronically signed by:  Silvio Drummond PT, DPT, OCS  Physical Therapist  [id number]  [email or web address]    Note: If patient does not return for scheduled/ recommended follow up visits, this note will serve as a discharge from care along with most recent update on progress.

## 2021-11-16 NOTE — PLAN OF CARE
a history of 5 dislocations, 4 of which were all when she was younger and playing sports, all anterior. Most recently had L shoulder dislocation May 2020, reaching back for a heavy winter coat, felt like it got caught. Pt has had pain and increased stiffness since that time. She has pain with sleeping on L side, it is painful in the back of the shoulder. Reaching out to put arm around someone, going into extension are also painful. Pain is sharp and stabbing in nature, at posterior aspect of humerus. Pt denies N/T. Pt no longer lifts OH and she is very cautious whenever she needs to put her arms OH. Pt was doing some band work (ER, IR, rows, ext) but stopped about a week ago. Pt knows Tremaine Havers (PTA) so had gotten some exercises from him about 1.5 months ago, she was completing. 3x15 about 5 days per week. Pt did not feel like it was helpful. Pt is RHD. Hobbies: golf  Goals: improve stiffness and strength    Relevant Medical History: see intake form  Functional Disability Index: UEFI 76/80=95% (5% deficit)    Pain Scale: 3-4/10  Easing factors: stop aggravating activity, massage  Provocative factors: reaching, lifting, sleeping     Type: []Constant   [x]Intermittent  []Radiating []Localized []other:     Numbness/Tingling: No    Occupation/School: [employer]    Living Status/Prior Level of Function: Independent with ADLs and IADLs.     OBJECTIVE:     ROM PROM AROM  Comment    L R L R    Flexion   140 WNL    Abduction   135 WNL    ER   60 WNL    IR   60 WNL        Strength L R Comment   Flexion 4/5     Abduction 4/5     ER 4/5     IR 4-/5     Upper Trap      Lower Trap 4-/5     Mid Trap 4-/5     Biceps 4/5     Triceps 4/5       Special Tests Results/Comment   IRRST -   Katharina-Naveen -   Neers -   Painful Arc -   Drop Arm -   ER Lag Sign -   Empty Can / Jocelyn's -   Champagne Toast -   Speeds -   OBriens -   Cross body adduction -   Apprehension/Relocation +   Hornblower's -   Bicep Load II Not assessed Reflexes/Sensation:               [x]Dermatomes/Myotomes intact               []Reflexes equal and normal bilaterally               []Other:     Joint mobility:               []Normal               [x]Hypo              []Hyper     Palpation: no TTP around [facility] joint     Functional Mobility/Transfers: Independent     Posture: Rounded shoulder                       [x] Patient history, allergies, meds reviewed. Medical chart reviewed. See intake form. Review Of Systems (ROS):  [x]Performed Review of systems (Integumentary, CardioPulmonary, Neurological) by intake and observation. Intake form has been scanned into medical record. Patient has been instructed to contact their primary care physician regarding ROS issues if not already being addressed at this time.        Co-morbidities/Complexities (which will affect course of rehabilitation):   [x]None              Arthritic conditions   []Rheumatoid arthritis (M05.9)  []Osteoarthritis (M19.91)    Cardiovascular conditions   []Hypertension (I10)  []Hyperlipidemia (E78.5)  []Angina pectoris (I20)  []Atherosclerosis (I70)    Musculoskeletal conditions   []Disc pathology   []Congenital spine pathologies   []Prior surgical intervention  []Osteoporosis (M81.8)  []Osteopenia (M85.8)   Endocrine conditions   []Hypothyroid (E03.9)  []Hyperthyroid Gastrointestinal conditions   []Constipation (E71.00)    Metabolic conditions   []Morbid obesity (E66.01)  []Diabetes type 1(E10.65) or 2 (E11.65)   []Neuropathy (G60.9)      Pulmonary conditions   []Asthma (J45)  []Coughing   []COPD (J44.9)    Psychological Disorders  []Anxiety (F41.9)  []Depression (F32.9)   []Other:    []Other:            Barriers to/and or personal factors that will affect rehab potential:              []Age  []Sex              []Motivation/Lack of Motivation                        []Co-Morbidities              []Cognitive Function, education/learning barriers              []Environmental, home barriers []profession/work barriers  []past PT/medical experience  [x]other:  Justification: Pt is active and motivated to get better     Falls Risk Assessment (30 days):   [x] Falls Risk assessed and no intervention required. [] Falls Risk assessed and Patient requires intervention due to being higher risk   TUG score (>12s at risk):     [] Falls education provided, including      ASSESSMENT:   Functional Impairments              [x]Noted spinal or UE joint hypomobility              []Noted spinal or UE joint hypermobility              [x]Decreased UE functional ROM              [x]Decreased UE functional strength              [x]Abnormal reflexes/sensation/myotomal/dermatomal deficits              [x]Decreased RC/scapular/core strength and neuromuscular control              []other:       Functional Activity Limitations (from functional questionnaire and intake)              [x]Reduced ability to tolerate prolonged functional positions              []Reduced ability or difficulty with changes of positions or transfers between positions              []Reduced ability to maintain good posture and demonstrate good body mechanics with sitting, bending, and lifting              [] Reduced ability or tolerance with driving and/or computer work              [x]Reduced ability to sleep              [x]Reduced ability to perform lifting, reaching, carrying tasks              []Reduced ability to tolerate impact through UE              [x]Reduced ability to reach behind back              []Reduced ability to  or hold objects              []Reduced ability to throw or toss an object              []other:       Participation Restrictions              [x]Reduced participation in self care activities              [x]Reduced participation in home management activities              [x]Reduced participation in work activities              [x]Reduced participation in social activities.               []Reduced participation in sport / recreational activities. Classification:              []Signs/symptoms consistent with post-surgical status including decreased ROM, strength and function.   []Signs/symptoms consistent with joint sprain/strain              []Signs/symptoms consistent with shoulder impingement              []Signs/symptoms consistent with shoulder/elbow/wrist tendinopathy              []Signs/symptoms consistent with Rotator cuff tear              []Signs/symptoms consistent with labral tear              []Signs/symptoms consistent with postural dysfunction                         []Signs/symptoms consistent with Glenohumeral IR Deficit - <45 degrees              []Signs/symptoms consistent with facet dysfunction of cervical/thoracic spine                         []Signs/symptoms consistent with pathology which may benefit from Dry needling                [x]other: adhesive capsulitis      Prognosis/Rehab Potential:                                       []Excellent              [x]Good                 []Fair              []Poor     Tolerance of evaluation/treatment:               []Excellent              [x]Good                 []Fair              []Poor     Physical Therapy Evaluation Complexity Justification  [x] A history of present problem with:  [] no personal factors and/or comorbidities that impact the plan of care;  [x]1-2 personal factors and/or comorbidities that impact the plan of care  []3 personal factors and/or comorbidities that impact the plan of care  [x] An examination of body systems using standardized tests and measures addressing any of the following: body structures and functions (impairments), activity limitations, and/or participation restrictions;:  [] a total of 1-2 or more elements   [] a total of 3 or more elements   [x] a total of 4 or more elements   [x] A clinical presentation with:  [x] stable and/or uncomplicated characteristics   [] evolving clinical presentation with changing characteristics  [] unstable and unpredictable characteristics;   [x] Clinical decision making of [x] low, [] moderate, [] high complexity using standardized patient assessment instrument and/or measurable assessment of functional outcome. [x] EVAL (LOW) 95749 (typically 20 minutes face-to-face)  [] EVAL (MOD) 24766 (typically 30 minutes face-to-face)  [] EVAL (HIGH) 54669 (typically 45 minutes face-to-face)  [] RE-EVAL         PLAN:  Frequency/Duration:  1-2 days per week for 6-8 Weeks:  INTERVENTIONS:  [x] Therapeutic exercise including: strength training, ROM, for Upper extremity and core   [x]  NMR activation and proprioception for UE, scap and Core   [x] Manual therapy as indicated for shoulder, scapula and spine to include: Dry Needling/IASTM, STM, PROM, Gr I-IV mobilizations, manipulation. [x] Modalities as needed that may include: thermal agents, E-stim, Biofeedback, US, iontophoresis as indicated  [x] Patient education on joint protection, postural re-education, activity modification, progression of HEP. Access Code: [facility]  URL: [email or web address]  Date: 11/16/2021  Prepared by: Mack Dillard    Exercises  · Prone Chest Stretch on Chair - 1 x daily - 7 x weekly - 1 sets - 3 reps - 30 second hold  · Standing Shoulder Internal Rotation Stretch with Hands Behind Back - 1 x daily - 7 x weekly - 1 sets - 3 reps - 30 second hold  · Standing Shoulder Posterior Capsule Stretch - 1 x daily - 7 x weekly - 1 sets - 3 reps - 30 second hold  · Doorway Pec Stretch at 60 Elevation - 1 x daily - 7 x weekly - 1 sets - 3 reps - 30 second hold  · Prone Scapular Retraction - 1 x daily - 7 x weekly - 1 sets - 10 reps - 10 second hold  · Sidelying Shoulder ER with Towel and Dumbbell - 1 x daily - 7 x weekly - 1 sets - 10 reps     GOALS:   Patient stated goal: decrease stiffness, improve strength, return to golf    [x] Progressing: [] Met: [] Not Met: [] Adjusted    Therapist goals for Patient: Short Term Goals: To be achieved in: 2-4 weeks 11/30/21-12/14/21  1. Independent in HEP and progression per patient tolerance, in order to prevent re-injury. [] Progressing: [] Met: [] Not Met: [] Adjusted   2. Patient will have a decrease in pain to facilitate improvement in movement, function, and ADLs as indicated by Functional Deficits. [] Progressing: [] Met: [] Not Met: [] Adjusted     3. Patient will demonstrate 10-15 deg improvement in AROM flex/abd/ER for improved functional mobility. [] Progressing: [] Met: [] Not Met: [] Adjusted     Long Term Goals: To be achieved in: 8 weeks 1/11/22  1. Disability index score of 0% for the UEFS to assist with reaching prior level of function. [] Progressing: [] Met: [] Not Met: [] Adjusted  2. Patient will demonstrate increased AROM to 170 flex/abd and 80 ER or greater to allow for proper joint functioning as indicated by patients Functional Deficits. [] Progressing: [] Met: [] Not Met: [] Adjusted  3. Patient will demonstrate an increase in strength to 4+/5 or greater to allow for proper functional mobility as indicated by patients Functional Deficits. [] Progressing: [] Met: [] Not Met: [] Adjusted  4. Patient will return to ADLs, IADLs and functional activities without increased symptoms or restriction. [] Progressing: [] Met: [] Not Met: [] Adjusted  5. Patient will tolerate sleeping on L side without increased symptoms for improved sleeping tolerance. [] Progressing: [] Met: [] Not Met: [] Adjusted    Electronically signed by:  Marium Pink, PT, DPT, OCS  Physical Therapist  [id number]  [email or web address]    Note: If patient does not return for scheduled/ recommended follow up visits, this note will serve as a discharge from care along with most recent update on progress.

## 2021-11-22 ENCOUNTER — OFFICE VISIT (OUTPATIENT)
Dept: SURGERY | Age: 61
End: 2021-11-22
Payer: COMMERCIAL

## 2021-11-22 VITALS
SYSTOLIC BLOOD PRESSURE: 144 MMHG | WEIGHT: 183 LBS | DIASTOLIC BLOOD PRESSURE: 78 MMHG | HEART RATE: 55 BPM | HEIGHT: 70 IN | OXYGEN SATURATION: 98 % | BODY MASS INDEX: 26.2 KG/M2

## 2021-11-22 DIAGNOSIS — D17.1 LIPOMA OF TORSO: Primary | ICD-10-CM

## 2021-11-22 PROCEDURE — 99204 OFFICE O/P NEW MOD 45 MIN: CPT | Performed by: SURGERY

## 2021-11-22 NOTE — PROGRESS NOTES
[facility]    CC: Back mass    Referring Physician: Mery Way MD    HPI: This is an 64 y. o.female with a PMHx as delineated below who presents to clinic in consultation for a recurrent, back mass. She has had a painful back mass that was previously excised 2 years ago by Dr. Charly Varghese. She did well, however noted that it has recurred. Given the size of the defect and the recurrence, plastic surgery was consulted for evaluation and treatment.     PMHx:   Past Medical History:   Diagnosis Date    Allergic rhinitis     Chicken pox     Chondromalacia patellae, left knee     Dyspareunia     HPV (human papilloma virus) anogenital infection     Hyperlipidemia     PONV (postoperative nausea and vomiting)     Recurrent cold sores     STD (sexually transmitted disease)     HPV     PSHx:   Past Surgical History:   Procedure Laterality Date    COLONOSCOPY  8/28/2020    COLONOSCOPY WITH BIOPSY performed by Shekhar Larson MD at [place] [address] ARTHROSCOPY Left 07/26/2018    LEEP      SKIN TAG REMOVAL      TOOTH EXTRACTION      UPPER GASTROINTESTINAL ENDOSCOPY N/A 8/28/2020    EGD BIOPSY performed by Shekhar Larson MD at [address] [place] [address] N/A 8/28/2020    EGD Antonette Seats performed by Shekhar Larson MD at [address] [place] [address] N/A 12/4/2020    ESOPHAGOGASTRODUODENOSCOPY performed by Loree Fernandez MD at [facility] ENDOSCOPY     Allergy:   Allergies   Allergen Reactions    Penicillins Hives    Sulfa Antibiotics Hives       SHx:   Social History     Socioeconomic History    Marital status:      Spouse name: Not on file    Number of children: Not on file    Years of education: Not on file    Highest education level: Not on file   Occupational History    Not on file   Tobacco Use    Smoking status: Never Smoker    Smokeless tobacco: Never Used   Vaping Use  Vaping Use: Never used   Substance and Sexual Activity    Alcohol use: Yes     Alcohol/week: 8.0 standard drinks     Types: 4 Cans of beer, 4 Standard drinks or equivalent per week     Comment: occasionally    Drug use: No    Sexual activity: Yes     Partners: Male   Other Topics Concern    Not on file   Social History Narrative    Not on file     Social Determinants of Health     Financial Resource Strain: Low Risk     Difficulty of Paying Living Expenses: Not hard at all   Food Insecurity: No Food Insecurity    Worried About Running Out of Food in the Last Year: Never true    Felecia of Food in the Last Year: Never true   Transportation Needs: No Transportation Needs    Lack of Transportation (Medical): No    Lack of Transportation (Non-Medical):  No   Physical Activity:     Days of Exercise per Week: Not on file    Minutes of Exercise per Session: Not on file   Stress:     Feeling of Stress : Not on file   Social Connections:     Frequency of Communication with Friends and Family: Not on file    Frequency of Social Gatherings with Friends and Family: Not on file    Attends Baptism Services: Not on file    Active Member of [address] [place] [address] or Organizations: Not on file    Attends Club or Organization Meetings: Not on file    Marital Status: Not on file   Intimate Partner Violence:     Fear of Current or Ex-Partner: Not on file    Emotionally Abused: Not on file    Physically Abused: Not on file    Sexually Abused: Not on file   Housing Stability:     Unable to Pay for Housing in the Last Year: Not on file    Number of [place] in the Last Year: Not on file    Unstable Housing in the Last Year: Not on file     FHx: Family history of skin CA: Yes (melanoma brother)    Meds:   Current Outpatient Medications   Medication Sig Dispense Refill    omeprazole (PRILOSEC) 20 MG delayed release capsule       meloxicam (MOBIC) 15 MG tablet Take 1 tablet by mouth daily as needed for Pain 30 tablet 5  valACYclovir (VALTREX) 1 g tablet TAKE TWO TABLETS BY MOUTH TWICE A DAY FOR 2 DOSES AS NEEDED 36 tablet 0    rosuvastatin (CRESTOR) 10 MG tablet TAKE 1 TABLET BY MOUTH ONE TIME A DAY 90 tablet 1    Turmeric (QC TUMERIC COMPLEX PO) Take 1 capsule by mouth      NONFORMULARY Elderberry syrup      vitamin B-12 (CYANOCOBALAMIN) 100 MCG tablet Take 50 mcg by mouth daily      Probiotic Product (PROBIOTIC-10 ULTIMATE PO) Take by mouth      Vitamin D (CHOLECALCIFEROL) 1000 UNITS CAPS capsule Take 1,000 Units by mouth daily. No current facility-administered medications for this visit. ROS   Constitutional: Negative for chills and fever. HENT: Negative for congestion, facial swelling, and voice change. Eyes: Negative for photophobia and visual disturbance. Respiratory: Negative for apnea, cough, chest tightness and shortness of breath. Cardiovascular: Negative for chest pain and palpitations. Gastrointestinal: Negative for dysphagia and early satiety. Genitourinary: Negative for difficulty urinating, dysuria, flank pain, frequency and hematuria. Musculoskeletal: Negative for new gait problem, joint swelling and myalgias. Skin: Negative for color change, pallor and rash. Endocrine: negative for tremors, temperature intolerance or polydipsia. Allergic/Immunologic: Negative for new environmental or food allergies. Neurological: Negative for dizziness, seizures, speech difficulty, numbness. Hematological: Negative for adenopathy. Psychiatric/Behavioral: Negative for agitation and confusion.     EXAM    BP (!) 144/78   Pulse 55   Ht 5' 10\" (1.778 m)   Wt 183 lb (83 kg)   LMP 07/28/2012   SpO2 98%   BMI 26.26 kg/m²     GEN: NAD, pleasant, healthy  CVS: RRR  PULM: No respiratory distress  HEENT: PERRLA/EOMI; hearing appears within normal limits  NECK: Supple with trachea in midline, no masses  BACK: 3 x 4 cm back lipoma just left of the midline in the thoracic location    IMP: 61 y.o.female with recurrent, back lipoma. PLAN: Will obtain US for evaluation of the mass to evaluate the depth. Will then plan for removal under [address] at [facility] (potentially with significant skin removal with keystone flap closure). A discussion regarding surgical options including: excision was performed with the patient . The pathophysiology of recurrent lipomas was also elucidated specifying need for resection, observation, & margins. Clinical photos were obtained. Additionally, discussion regarding the risks including, but not limited to: bleeding (potentially requiring transfusion or reoperation), infection, seroma, reoperation, poor cosmetic outcome, scarring, recurrence, revisional surgery, diminished sensation, VTE (DVT/PE), and death was performed. All questions were answered in a satisfactory manner. The patient was counseled at length about the risks of nilda Covid-19 during their perioperative period and any recovery window from their procedure. The patient was made aware that nilda Covid-19  may worsen their prognosis for recovering from their procedure  and lend to a higher morbidity and/or mortality risk. All material risks, benefits, and reasonable alternatives including postponing the procedure were discussed. The patient does wish to proceed with the procedure at this time.     Ricky Corbett MD  [address] [facility]  [phone]  11/22/21

## 2021-11-23 ENCOUNTER — HOSPITAL ENCOUNTER (OUTPATIENT)
Dept: PHYSICAL THERAPY | Age: 61
Setting detail: THERAPIES SERIES
Discharge: HOME | End: 2021-11-23
Payer: COMMERCIAL

## 2021-11-23 PROCEDURE — 97110 THERAPEUTIC EXERCISES: CPT | Performed by: PHYSICAL THERAPIST

## 2021-11-23 NOTE — FLOWSHEET NOTE
[facility]  Orthopaedics and Sports Rehabilitation, Kristine Pepe    Physical Therapy Daily Treatment Note  Date:  2021    Patient Name:  Shy Glass    :  1960  MRN: [id number]  Restrictions/Precautions:    Medical/Treatment Diagnosis Information:  · Diagnosis: M25.512 (ICD-10-CM) - Left shoulder pain, unspecified [id number] (ICD-10-CM) - Adhesive capsulitis of left shoulder  · Treatment Diagnosis: M25.612, Y20.6  Insurance/Certification information:  PT Insurance Information: PT BENEFITS  FACILITY/ MEDICAL MUTUAL/ EFFECTIVE 2020/ ACTIVE/ DED 1000 MET 1000/ PAYS 90%/ OOP 2000 MET 2000/ NO VISIT LIMIT MED NEC/ 0 AUTH/ ALL CODES BILLABLE CODE 88486 NOT COVERED/ PRESERVICE/ REF# [id number]/ 11- PAG  Physician Information:  Referring Practitioner: Emeka Miranda MD  Has the plan of care been signed (Y/N):        []  Yes  [x]  No     Date of Patient follow up with Physician: [address]      Is this a Progress Report:     []  Yes  [x]  No        If Yes:  Date Range for reporting period:  Beginning  Ending    Progress report will be due (10 Rx or 30 days whichever is less):        Recertification will be due (POC Duration  / 90 days whichever is less): 22         Visit # Insurance Allowable Auth Required   IE + 1 NO LIMIT []  Yes []  No        Functional Scale:    Date assessed:  UEFI 76/80=95% (5% deficit)  21    Latex Allergy:  [x]NO      []YES  Preferred Language for Healthcare:   [x]English       []other:    Pain level:  3-4/10     SUBJECTIVE:  Pt states that she is feeling much better, HEP is going really well. She is not feeling as much pinching in her shoulder. She is not having pain with the stretching, she can go a lot further with the ER doorway stretches. Pt has done all of her stretching today, but has not done strength today.     OBJECTIVE:              ROM PROM AROM  Comment     L R L R     Flexion     140 WNL     Abduction     135 WNL   ER     60 WNL     IR     60 WNL           Strength L R Comment   Flexion 4/5       Abduction 4/5       ER 4/5       IR 4-/5       Upper Trap         Lower Trap 4-/5       Mid Trap 4-/5       Biceps 4/5       Triceps 4/5          Special Tests Results/Comment   IRRST -   Posadas-Naveen -   Neers -   Painful Arc -   Drop Arm -   ER Lag Sign -   Empty Can / Jocelyn's -   Champagne Toast -   Speeds -   OBriens -   Cross body adduction -   Apprehension/Relocation +   Hornblower's -   Bicep Load II Not assessed         Reflexes/Sensation:               [x]? Dermatomes/Myotomes intact               []? Reflexes equal and normal bilaterally               []? Other:     Joint mobility:               []? Normal               [x]? Hypo              []? Hyper     Palpation: no TTP around [facility] joint     Functional Mobility/Transfers: Independent     Posture: Rounded shoulder    RESTRICTIONS/PRECAUTIONS:     Exercises/Interventions:     Exercise/Equipment Resistance/Repetitions Other comments   Stretching/PROM     Lat stretch Seated, arms on table for leverage   Cross body ADD    BB IR    Doorway              Isometrics     Retraction          Weight shift     Flexion     Abduction     External Rotation     Internal Rotation     Biceps     Triceps          PRE's     Flexion     Abduction     External Rotation 3x10 3# sidelying   Internal Rotation 3x10 5#    Shrugs     EXT     Reverse Flys     Serratus 3x10 3#    Horizontal Abd with ER     Biceps     Triceps     Retraction     Prone row 3x10 3#    Prone T Attempted- p!  And limited ROM         Cable Column/Theraband     External Rotation     Internal Rotation     Shrugs     Lats     Ext     Flex     Scapular Retraction     BIC     TRIC     W ER 3x10 green    Supine HABD 3x10 green         Dynamic Stability     Ball on wall 3x10 up/down  3x10 s/s         P[place]oback          Manual interventions                 Plan for next session: progress as tolerated, advance prone strength - 7 x weekly - 3 sets - 10 reps  · Shoulder External Rotation and Scapular Retraction with Resistance - 1 x daily - 7 x weekly - 3 sets - 10 reps  · Standing Wall Ball On Wall (up/down, s/s) with Mini Swiss Ball - 1 x daily - 7 x weekly - 3 sets - 10 reps     [x] (99886) Reviewed/Progressed HEP activities related to strengthening, flexibility, endurance, ROM of scapular, scapulothoracic and UE control with self care, reaching, carrying, lifting, house/yardwork, driving/computer work  [] (89927) Reviewed/Progressed HEP activities related to improving balance, coordination, kinesthetic sense, posture, motor skill, proprioception of scapular, scapulothoracic and UE control with self care, reaching, carrying, lifting, house/yardwork, driving/computer work      Manual Treatments:    [] ([phone]) Provided manual therapy to mobilize soft tissue/joints of cervical/CT, scapular GHJ and UE for the purpose of modulating pain, promoting relaxation,  increasing ROM, reducing/eliminating soft tissue swelling/inflammation/restriction, improving soft tissue extensibility and allowing for proper ROM for normal function with self care, reaching, carrying, lifting, house/yardwork, driving/computer work    Modalities:   None    Charges:  Timed Code Treatment Minutes: 40   Total Treatment Minutes: 40   Time in: 5:00  Time out: 5:40    [x] EVAL (LOW) 17872 (typically 20 minutes face-to-face)  [] EVAL (MOD) 69267 (typically 30 minutes face-to-face)  [] EVAL (HIGH) 46660 (typically 45 minutes face-to-face)  [] RE-EVAL     [x] UO(04640) x3     [] IONTO  [] NMR (75984) x     [] VASO  [] Manual (14279) x      [] Other:  [] TA x      [] Mech Traction (81551)  [] ES(attended) (11897)      [] ES (un) (53160):     GOALS:  Patient stated goal: decrease stiffness, improve strength, return to golf    [x]? Progressing: []? Met: []? Not Met: []? Adjusted     Therapist goals for Patient:   Short Term Goals:  To be achieved in: 2-4 weeks 11/30/21-12/14/21  1. Independent in HEP and progression per patient tolerance, in order to prevent re-injury. []? Progressing: []? Met: []? Not Met: []? Adjusted   2. Patient will have a decrease in pain to facilitate improvement in movement, function, and ADLs as indicated by Functional Deficits. []? Progressing: []? Met: []? Not Met: []? Adjusted     3. Patient will demonstrate 10-15 deg improvement in AROM flex/abd/ER for improved functional mobility. []? Progressing: []? Met: []? Not Met: []? Adjusted      Long Term Goals: To be achieved in: 8 weeks 1/11/22  1. Disability index score of 0% for the UEFS to assist with reaching prior level of function. []? Progressing: []? Met: []? Not Met: []? Adjusted  2. Patient will demonstrate increased AROM to 170 flex/abd and 80 ER or greater to allow for proper joint functioning as indicated by patients Functional Deficits. []? Progressing: []? Met: []? Not Met: []? Adjusted  3. Patient will demonstrate an increase in strength to 4+/5 or greater to allow for proper functional mobility as indicated by patients Functional Deficits. []? Progressing: []? Met: []? Not Met: []? Adjusted  4. Patient will return to ADLs, IADLs and functional activities without increased symptoms or restriction. []? Progressing: []? Met: []? Not Met: []? Adjusted  5. Patient will tolerate sleeping on L side without increased symptoms for improved sleeping tolerance. []? Progressing: []? Met: []? Not Met: []? Adjusted    Overall Progression Towards Functional goals/ Treatment Progress Update:  [] Patient is progressing as expected towards functional goals listed. [] Progression is slowed due to complexities/Impairments listed. [] Progression has been slowed due to co-morbidities.   [x] Plan just implemented, too soon to assess goals progression <30days   [] Goals require adjustment due to lack of progress  [] Patient is not progressing as expected and requires additional follow up with physician  [] Other    Prognosis for POC: [x] Good [] Fair  [] Poor      Patient requires continued skilled intervention: [x] Yes  [] No    Treatment/Activity Tolerance:  [x] Patient able to complete treatment  [] Patient limited by fatigue  [] Patient limited by pain     [] Patient limited by other medical complications  [] Other: Pt tolerated progression of exercise program well. She required initial tactile cues for proper mid/low trap activation with rows while avoiding UT compensation. Pt reported anterior shoulder pain with prone T and exhibited decreased ROM, performed supine HABD in place of prone T and pt tolerated well. Pt reported fatigue with exercise program. Pt requires PT follow up to address ROM, strength and functional mobility deficits. PLAN: See eval  [x] Continue per plan of care [] Alter current plan (see comments above)  [] Plan of care initiated [] Hold pending MD visit [] Discharge      Electronically signed by:  Sung Dutton, PT, DPT, OCS  Physical Therapist  [id number]  [email or web address]    Note: If patient does not return for scheduled/ recommended follow up visits, this note will serve as a discharge from care along with most recent update on progress.

## 2021-11-24 ENCOUNTER — HOSPITAL ENCOUNTER (OUTPATIENT)
Dept: ULTRASOUND IMAGING | Age: 61
Discharge: HOME | End: 2021-11-24
Payer: COMMERCIAL

## 2021-11-24 DIAGNOSIS — D17.1 LIPOMA OF TORSO: ICD-10-CM

## 2021-11-24 PROCEDURE — 76604 US EXAM CHEST: CPT

## 2021-11-30 ENCOUNTER — TELEPHONE (OUTPATIENT)
Dept: SURGERY | Age: 61
End: 2021-11-30

## 2021-11-30 ENCOUNTER — HOSPITAL ENCOUNTER (OUTPATIENT)
Dept: PHYSICAL THERAPY | Age: 61
Setting detail: THERAPIES SERIES
Discharge: HOME | End: 2021-11-30
Payer: COMMERCIAL

## 2021-11-30 PROCEDURE — 97530 THERAPEUTIC ACTIVITIES: CPT | Performed by: PHYSICAL THERAPIST

## 2021-11-30 PROCEDURE — 97110 THERAPEUTIC EXERCISES: CPT | Performed by: PHYSICAL THERAPIST

## 2021-11-30 NOTE — FLOWSHEET NOTE
The [facility]  Orthopaedics and Sports Rehabilitation, [facility]    Physical Therapy Daily Treatment Note  Date:  2021    Patient Name:  Valente Aguilar    :  1960  MRN: [id number]  Restrictions/Precautions:    Medical/Treatment Diagnosis Information:  · Diagnosis: M25.512 (ICD-10-CM) - Left shoulder pain, unspecified [id number] (ICD-10-CM) - Adhesive capsulitis of left shoulder  · Treatment Diagnosis: M25.612, A49.0  Insurance/Certification information:  PT Insurance Information: PT BENEFITS  FACILITY/ MEDICAL MUTUAL/ EFFECTIVE 2020/ ACTIVE/ DED 1000 MET 1000/ PAYS 90%/ OOP 2000 MET 2000/ NO VISIT LIMIT MED NEC/ 0 AUTH/ ALL CODES BILLABLE CODE 93552 NOT COVERED/ PRESERVICE/ REF# [id number]/ 11- PAG  Physician Information:  Referring Practitioner: Rae Perez MD  Has the plan of care been signed (Y/N):        []  Yes  [x]  No     Date of Patient follow up with Physician: 21 Stephania Sher      Is this a Progress Report:     []  Yes  [x]  No        If Yes:  Date Range for reporting period:  Beginning  Ending    Progress report will be due (10 Rx or 30 days whichever is less):        Recertification will be due (POC Duration  / 90 days whichever is less): 22         Visit # Insurance Allowable Auth Required   IE + 2 NO LIMIT []  Yes []  No        Functional Scale:    Date assessed:  UEFI 76/80=95% (5% deficit)  21    Latex Allergy:  [x]NO      []YES  Preferred Language for Healthcare:   [x]English       []other:    Pain level:  3-4/10     SUBJECTIVE:  Pt states that her shoulder is feeling good. No complaints since LPV.     OBJECTIVE:              ROM PROM AROM  Comment     L R L R     Flexion     153 WNL     Abduction  120 p!   90 WNL     ER  71   68 WNL     IR     60 WNL           Strength L R Comment   Flexion 4/5       Abduction 4/5       ER 4/5       IR 4-/5       Upper Trap         Lower Trap 4-/5       Mid Trap 4-/5       Biceps 4/5       Triceps 4/5          Special Tests Results/Comment   IRRST -   Posadas-Naveen -   Neers -   Painful Arc -   Drop Arm -   ER Lag Sign -   Empty Can / Jocelyn's -   [address] body adduction -   Apprehension/Relocation +   Hornblower's -   Bicep Load II Not assessed         Reflexes/Sensation:               [x]? Dermatomes/Myotomes intact               []? Reflexes equal and normal bilaterally               []? Other:     Joint mobility:               []? Normal               [x]? Hypo              []? Hyper     Palpation: no TTP around [facility] joint     Functional Mobility/Transfers: Independent     Posture: Rounded shoulder    RESTRICTIONS/PRECAUTIONS:     Exercises/Interventions:     Exercise/Equipment Resistance/Repetitions Other comments   Stretching/PROM     Lat stretch Seated, arms on table for leverage   Cross body ADD    BB IR    Doorway              Isometrics     Retraction          Weight shift     Flexion     Abduction     External Rotation     Internal Rotation     Biceps     Triceps          PRE's     Flexion     Abduction     External Rotation 3x10 3# sidelying   Internal Rotation 3x10 5#    Shrugs     Wall push up 3x10 Half wall   Reverse Flys     Serratus 3x10 3/5/5#    Horizontal Abd with ER     Biceps 3x10 5#    Triceps 3x10 3# skull     Retraction     Prone row 3x10 3#    Prone ext 3x10 2#    Prone T   11/23 Attempted- p!  And limited ROM         Cable Column/Theraband     External Rotation     Internal Rotation     Shrugs     Lats     Ext     Flex     Scapular Retraction     BIC     TRIC     W ER 3x10 green    Supine HABD 3x10 green         Dynamic Stability     Ball on wall 3x10 up/down  3x10 s/s         [place]ack          Manual interventions     Joint mobilization 4' inferior, GHJ, grade 3           Plan for next session: progress as tolerated, advance prone strength program    Therapeutic Exercise and NMR EXR  [x] (01175) Provided verbal/tactile cueing for activities related to strengthening, flexibility, endurance, ROM  for improvements in scapular, scapulothoracic and UE control with self care, reaching, carrying, lifting, house/yardwork, driving/computer work.    [] (49362) Provided verbal/tactile cueing for activities related to improving balance, coordination, kinesthetic sense, posture, motor skill, proprioception  to assist with  scapular, scapulothoracic and UE control with self care, reaching, carrying, lifting, house/yardwork, driving/computer work. Therapeutic Activities:    [] (19929 or 53823) Provided verbal/tactile cueing for activities related to improving balance, coordination, kinesthetic sense, posture, motor skill, proprioception and motor activation to allow for proper function of scapular, scapulothoracic and UE control with self care, carrying, lifting, driving/computer work. Home Exercise Program:   Access Code: Stella Sanders: [email or web address]  Date: 11/16/2021  Prepared by: Magdiel Adair    Exercises  · Prone Chest Stretch on Chair - 1 x daily - 7 x weekly - 1 sets - 3 reps - 30 second hold  · Standing Shoulder Internal Rotation Stretch with Hands Behind Back - 1 x daily - 7 x weekly - 1 sets - 3 reps - 30 second hold  · Standing Shoulder Posterior Capsule Stretch - 1 x daily - 7 x weekly - 1 sets - 3 reps - 30 second hold  · Doorway Pec Stretch at 60 Elevation - 1 x daily - 7 x weekly - 1 sets - 3 reps - 30 second hold  · Sidelying Shoulder ER with Towel and Dumbbell - 1 x daily - 7 x weekly - 1 sets - 10 reps  · Sidelying Shoulder Internal Rotation with Dumbbell - 1 x daily - 7 x weekly - 3 sets - 10 reps  · Supine Scapular Protraction in Flexion with Dumbbells - 1 x daily - 7 x weekly - 3 sets - 10 reps  · Prone Shoulder Row - 1 x daily - 7 x weekly - 3 sets - 10 reps  · Supine Shoulder Horizontal Abduction with Resistance - 1 x daily - 7 x weekly - 3 sets - 10 reps  · Shoulder External Rotation and Scapular Retraction with Resistance - 1 x daily - 7 x weekly - 3 sets - 10 reps  · Standing Wall Ball On Wall (up/down, s/s) with Mini Swiss Ball - 1 x daily - 7 x weekly - 3 sets - 10 reps     [x] (02481) Reviewed/Progressed HEP activities related to strengthening, flexibility, endurance, ROM of scapular, scapulothoracic and UE control with self care, reaching, carrying, lifting, house/yardwork, driving/computer work  [] (38394) Reviewed/Progressed HEP activities related to improving balance, coordination, kinesthetic sense, posture, motor skill, proprioception of scapular, scapulothoracic and UE control with self care, reaching, carrying, lifting, house/yardwork, driving/computer work      Manual Treatments:    [] (38970) Provided manual therapy to mobilize soft tissue/joints of cervical/CT, scapular GHJ and UE for the purpose of modulating pain, promoting relaxation,  increasing ROM, reducing/eliminating soft tissue swelling/inflammation/restriction, improving soft tissue extensibility and allowing for proper ROM for normal function with self care, reaching, carrying, lifting, house/yardwork, driving/computer work    Modalities:   None    Charges:  Timed Code Treatment Minutes: 49   Total Treatment Minutes: 49   Time in: 4:48  Time out: 5:37    [] EVAL (LOW) 33248 (typically 20 minutes face-to-face)  [] EVAL (MOD) 37798 (typically 30 minutes face-to-face)  [] EVAL (HIGH) 28039 (typically 45 minutes face-to-face)  [] RE-EVAL     [x] PO(47673) x3     [] IONTO  [] NMR (33124) x     [] VASO  [] Manual (49167) x      [] Other:  [] TA x      [] Mech Traction (78946)  [] ES(attended) (83086)      [] ES (un) (50646):     GOALS:  Patient stated goal: decrease stiffness, improve strength, return to golf    [x]? Progressing: []? Met: []? Not Met: []? Adjusted     Therapist goals for Patient:   Short Term Goals: To be achieved in: 2-4 weeks 11/30/21-12/14/21  1. Independent in HEP and progression per patient tolerance, in order to prevent re-injury.      []? Progressing: []? Met: []? Not Met: []? Adjusted   2. Patient will have a decrease in pain to facilitate improvement in movement, function, and ADLs as indicated by Functional Deficits. []? Progressing: []? Met: []? Not Met: []? Adjusted     3. Patient will demonstrate 10-15 deg improvement in AROM flex/abd/ER for improved functional mobility. []? Progressing: []? Met: []? Not Met: []? Adjusted      Long Term Goals: To be achieved in: 8 weeks 1/11/22  1. Disability index score of 0% for the UEFS to assist with reaching prior level of function. []? Progressing: []? Met: []? Not Met: []? Adjusted  2. Patient will demonstrate increased AROM to 170 flex/abd and 80 ER or greater to allow for proper joint functioning as indicated by patients Functional Deficits. []? Progressing: []? Met: []? Not Met: []? Adjusted  3. Patient will demonstrate an increase in strength to 4+/5 or greater to allow for proper functional mobility as indicated by patients Functional Deficits. []? Progressing: []? Met: []? Not Met: []? Adjusted  4. Patient will return to ADLs, IADLs and functional activities without increased symptoms or restriction. []? Progressing: []? Met: []? Not Met: []? Adjusted  5. Patient will tolerate sleeping on L side without increased symptoms for improved sleeping tolerance. []? Progressing: []? Met: []? Not Met: []? Adjusted    Overall Progression Towards Functional goals/ Treatment Progress Update:  [] Patient is progressing as expected towards functional goals listed. [] Progression is slowed due to complexities/Impairments listed. [] Progression has been slowed due to co-morbidities.   [x] Plan just implemented, too soon to assess goals progression <30days   [] Goals require adjustment due to lack of progress  [] Patient is not progressing as expected and requires additional follow up with physician  [] Other    Prognosis for POC: [x] Good [] Fair  [] Poor      Patient requires continued skilled intervention: [x] Yes  [] No    Treatment/Activity Tolerance:  [x] Patient able to complete treatment  [] Patient limited by fatigue  [] Patient limited by pain     [] Patient limited by other medical complications  [] Other: Pt exhibits improved AROM flex and ER since IE. She exhibits reduced ABD. Pt has posterior pain and is apprehensive during ABD. Pt able to achieve 120 PROM with PT guidance but reported pain at end range. Pt tolerated joint mobs well, only mild restriction noted inferiorly. Pt tolerated exercise program well, continues to note fatigue, did require occasional VC for proper form. Pt requires PT follow up to address ROM, strength and functional mobility deficits. PLAN: See eval  [x] Continue per plan of care [] Alter current plan (see comments above)  [] Plan of care initiated [] Hold pending MD visit [] Discharge      Electronically signed by:  Carmella Santamaria, PT, DPT, OCS  Physical Therapist  [phone]  [email or web address]    Note: If patient does not return for scheduled/ recommended follow up visits, this note will serve as a discharge from care along with most recent update on progress.

## 2021-11-30 NOTE — TELEPHONE ENCOUNTER
Patient last seen on 11/22/21:   IMP: 64 y. o.female with recurrent, back lipoma. PLAN: Will obtain US for evaluation of the mass to evaluate the depth. Will then plan for removal under [address] at [address] [place] [address] (potentially with significant skin removal with keystone flap closure). US confirmed 2.4 x 1.7 x 1.3 cm heterogeneously hypoechoic solid lesion. Routing to MD for surgery letter.

## 2021-11-30 NOTE — LETTER
Surgery Schedule Request Form  [facility]    DATE OF SURGERY:      TIME OF SURGERY:      Confirmation#:__________________        Surgeon Name: Moriah Messer MD    Phone: [phone]     Fax: [phone]  Procedure Name:  1) Excision of recurrent back lipoma     2) Possible keystone flap  CPT CODES (required for scheduling): 52340, 42086   DIAGNOSIS:   Recurrent, back lipoma    LENGTH OF PROCEDURE: 1 hour  Patient Status: Outpatient    Labs Needed:   CBC __  PT/PTT___ INR __  CMP ___ EKG ___   Urine Hcg ___            PATIENT NAME: [address] BIRTH: 1960  SEX: female   SS #:   PHONE: [phone] (home) [phone] (work)    Pre-Op to be done by: PCP  Cardiac Clearance Done by: per PCP    Pt Position:  supine  Patient to meet with Anesthesiology prior to surgery: no     Medications to be stopped 5 days before surgery: anticoagulation     Ancef 2 gm IV OCTOR (NOTE:  If patient weight is > 120 kg, Administer 3 gm)  Other Orders: eyestretcher    INSURANCE:                                              SUBSCRIBER NAME:   MEMBER ID:                                              AUTHORIZATION #:     PCP: Smiley Zimmer MD                                        ANESTHESIA:  GREGOR Messer MD                             FAX TO: [phone]   QUESTIONS?  CALL: [phone]

## 2021-12-06 NOTE — TELEPHONE ENCOUNTER
I received a surgery letter from Dr. Zulema Becerril. I spoke with the patient at the home number listed to discuss the surgery letter and insurance. The patient stated that she will not be continuing with Dr. Zulema Becerril. She stated that she is not happy with his plan. I offered an office visit appointment to discuss a different plan if possible. The patient declines. The patient ask if there was another plastic surgeon in our group. The patient is aware that Dr. Zulema Becerril is the only plastic surgeon for [facility] within the [facility]. The patient was advised that if she would like to move forward or come in to discuss a different plan to please call the office and we are happy to see her. I will remove the surgery letter from the letter queue. I will close this phone note.

## 2021-12-08 ENCOUNTER — HOSPITAL ENCOUNTER (OUTPATIENT)
Dept: PHYSICAL THERAPY | Age: 61
Setting detail: THERAPIES SERIES
Discharge: HOME | End: 2021-12-08
Payer: COMMERCIAL

## 2021-12-08 PROCEDURE — 97110 THERAPEUTIC EXERCISES: CPT | Performed by: PHYSICAL THERAPIST

## 2021-12-08 PROCEDURE — 97140 MANUAL THERAPY 1/> REGIONS: CPT | Performed by: PHYSICAL THERAPIST

## 2021-12-08 NOTE — FLOWSHEET NOTE
Sara Matos 668,  Sports Performance and Rehabilitation, [facility] 6199 [address] [place] [address]  [address] [place] [address],[address]   [facility]. [address]  P: [phone]  F: [phone]    Physical Therapy Daily Treatment Note  Date:  2021    Patient Name:  Bonifacio Orr    :  1960  MRN: [id number]  Restrictions/Precautions:    Medical/Treatment Diagnosis Information:  · Diagnosis: M25.512 (ICD-10-CM) - Left shoulder pain, unspecified [id number] (ICD-10-CM) - Adhesive capsulitis of left shoulder  · Treatment Diagnosis: M25.612, Z17.1  Insurance/Certification information:  PT Insurance Information: PT BENEFITS  FACILITY/ MEDICAL MUTUAL/ EFFECTIVE 2020/ ACTIVE/ DED 1000 MET 1000/ PAYS 90%/ OOP 2000 MET 2000/ NO VISIT LIMIT MED NEC/ 0 AUTH/ ALL CODES BILLABLE CODE 46358 NOT COVERED/ PRESERVICE/ REF# [id number]/ 11- PAG  Physician Information:  Referring Practitioner: Hemalatha Xiong MD  Has the plan of care been signed (Y/N):        []  Yes  [x]  No     Date of Patient follow up with Physician: 21 Reynold Lisset      Is this a Progress Report:     []  Yes  [x]  No        If Yes:  Date Range for reporting period:  Beginning  Ending    Progress report will be due (10 Rx or 30 days whichever is less):        Recertification will be due (POC Duration  / 90 days whichever is less): 22         Visit # Insurance Allowable Auth Required   IE + 3 NO LIMIT []  Yes []  No        Functional Scale:    Date assessed:  UEFI 76/80=95% (5% deficit)  21    Latex Allergy:  [x]NO      []YES  Preferred Language for Healthcare:   [x]English       []other:    Pain level:  3-4/10     SUBJECTIVE:  Pt states that her shoulder feels good. No complaints feelings about 75-80% improved since IE.     OBJECTIVE:              ROM PROM AROM  Comment     L R L R     Flexion     153 WNL     Abduction  150   90 WNL     ER  71   68 WNL     IR     60 WNL           Strength L R Comment   Flexion 4/5       Abduction 4/5       ER 4/5       IR 4-/5       Upper Trap         Lower Trap 4-/5       Mid Trap 4-/5       Biceps 4/5       Triceps 4/5          Special Tests Results/Comment   IRRST -   Posadas-Naveen -   Neers -   Painful Arc -   Drop Arm -   ER Lag Sign -   Empty Can / Jocelyn's -   Champagne Toast -   Speeds -   OBriens -   Cross body adduction -   Apprehension/Relocation +   Hornblower's -   Bicep Load II Not assessed         Reflexes/Sensation:               [x]? Dermatomes/Myotomes intact               []? Reflexes equal and normal bilaterally               []? Other:     Joint mobility:               []? Normal               [x]? Hypo              []? Hyper     Palpation: no TTP around [facility] joint     Functional Mobility/Transfers: Independent     Posture: Rounded shoulder    RESTRICTIONS/PRECAUTIONS:     Exercises/Interventions:     Exercise/Equipment Resistance/Repetitions Other comments   Stretching/PROM     Lat stretch Seated, arms on table for leverage   Cross body ADD    BB IR    Doorway              Isometrics     Retraction          Weight shift     Flexion     Abduction     External Rotation     Internal Rotation     Biceps     Triceps          PRE's     Flexion     Abduction     External Rotation 3x10 4# sidelying   Internal Rotation 3x10 8#    Shrugs     Wall push up  Half wall   Reverse Flys     Serratus 3x10 8#    Horizontal Abd with ER     Biceps 3x10 5#    Triceps 3x10 3# skull     Retraction     Prone row 3x10 3#    Prone ext 3x10 2#    Prone T   11/23 Attempted- p!  And limited ROM         Cable Column/Theraband     External Rotation     Internal Rotation     Shrugs     Lats 3x10 50#    Ext     Flex     Scapular Retraction 3x10 35#    BIC     TRIC     W ER     Supine HABD 3x10 blue         Dynamic Stability     Ball on wall 3x10 up/down  3x10 s/s 2# MB        Plyoback          Manual interventions     Joint mobilization 4' inferior, GHJ, grade 3    PROM 4' ABD      Plan for next session: progress as tolerated, advance prone strength program    Therapeutic Exercise and NMR EXR  [x] (09963) Provided verbal/tactile cueing for activities related to strengthening, flexibility, endurance, ROM  for improvements in scapular, scapulothoracic and UE control with self care, reaching, carrying, lifting, house/yardwork, driving/computer work.    [] (92210) Provided verbal/tactile cueing for activities related to improving balance, coordination, kinesthetic sense, posture, motor skill, proprioception  to assist with  scapular, scapulothoracic and UE control with self care, reaching, carrying, lifting, house/yardwork, driving/computer work. Therapeutic Activities:    [] (47209 or 28217) Provided verbal/tactile cueing for activities related to improving balance, coordination, kinesthetic sense, posture, motor skill, proprioception and motor activation to allow for proper function of scapular, scapulothoracic and UE control with self care, carrying, lifting, driving/computer work. Home Exercise Program:   Access Code: Litzy Johnston: [email or web address]  Date: 11/16/2021  Prepared by: Manda Lundy    Exercises  · Prone Chest Stretch on Chair - 1 x daily - 7 x weekly - 1 sets - 3 reps - 30 second hold  · Standing Shoulder Internal Rotation Stretch with Hands Behind Back - 1 x daily - 7 x weekly - 1 sets - 3 reps - 30 second hold  · Standing Shoulder Posterior Capsule Stretch - 1 x daily - 7 x weekly - 1 sets - 3 reps - 30 second hold  · Doorway Pec Stretch at 60 Elevation - 1 x daily - 7 x weekly - 1 sets - 3 reps - 30 second hold  · Sidelying Shoulder ER with Towel and Dumbbell - 1 x daily - 7 x weekly - 1 sets - 10 reps  · Sidelying Shoulder Internal Rotation with Dumbbell - 1 x daily - 7 x weekly - 3 sets - 10 reps  · Supine Scapular Protraction in Flexion with Dumbbells - 1 x daily - 7 x weekly - 3 sets - 10 reps  · Prone Shoulder Row - 1 x daily - 7 x weekly - 3 sets - 10 reps  · Supine Shoulder Horizontal Abduction with Resistance - 1 x daily - 7 x weekly - 3 sets - 10 reps  · Shoulder External Rotation and Scapular Retraction with Resistance - 1 x daily - 7 x weekly - 3 sets - 10 reps  · Standing Wall Ball On Wall (up/down, s/s) with Mini Swiss Ball - 1 x daily - 7 x weekly - 3 sets - 10 reps     [x] (53611) Reviewed/Progressed HEP activities related to strengthening, flexibility, endurance, ROM of scapular, scapulothoracic and UE control with self care, reaching, carrying, lifting, house/yardwork, driving/computer work  [] (23776) Reviewed/Progressed HEP activities related to improving balance, coordination, kinesthetic sense, posture, motor skill, proprioception of scapular, scapulothoracic and UE control with self care, reaching, carrying, lifting, house/yardwork, driving/computer work      Manual Treatments:    [] ([phone]) Provided manual therapy to mobilize soft tissue/joints of cervical/CT, scapular GHJ and UE for the purpose of modulating pain, promoting relaxation,  increasing ROM, reducing/eliminating soft tissue swelling/inflammation/restriction, improving soft tissue extensibility and allowing for proper ROM for normal function with self care, reaching, carrying, lifting, house/yardwork, driving/computer work    Modalities:   None    Charges:  Timed Code Treatment Minutes: 43   Total Treatment Minutes: 43   Time in: 7:47  Time out: 8:30    [] EVAL (LOW) 76042 (typically 20 minutes face-to-face)  [] EVAL (MOD) 55004 (typically 30 minutes face-to-face)  [] EVAL (HIGH) 53615 (typically 45 minutes face-to-face)  [] RE-EVAL     [x] FC(01399) x2     [] IONTO  [] NMR (38795) x     [] VASO  [x] Manual (70380) x 1     [] Other:  [] TA x      [] Mech Traction (61010)  [] ES(attended) (29118)      [] ES (un) (98087):     GOALS:  Patient stated goal: decrease stiffness, improve strength, return to golf    [x]? Progressing: []? Met: []? Not Met: []?  Adjusted     Therapist goals for Patient:   Short Term Goals: To be achieved in: 2-4 weeks 11/30/21-12/14/21  1. Independent in HEP and progression per patient tolerance, in order to prevent re-injury. []? Progressing: []? Met: []? Not Met: []? Adjusted   2. Patient will have a decrease in pain to facilitate improvement in movement, function, and ADLs as indicated by Functional Deficits. []? Progressing: []? Met: []? Not Met: []? Adjusted     3. Patient will demonstrate 10-15 deg improvement in AROM flex/abd/ER for improved functional mobility. []? Progressing: []? Met: []? Not Met: []? Adjusted      Long Term Goals: To be achieved in: 8 weeks 1/11/22  1. Disability index score of 0% for the UEFS to assist with reaching prior level of function. []? Progressing: []? Met: []? Not Met: []? Adjusted  2. Patient will demonstrate increased AROM to 170 flex/abd and 80 ER or greater to allow for proper joint functioning as indicated by patients Functional Deficits. []? Progressing: []? Met: []? Not Met: []? Adjusted  3. Patient will demonstrate an increase in strength to 4+/5 or greater to allow for proper functional mobility as indicated by patients Functional Deficits. []? Progressing: []? Met: []? Not Met: []? Adjusted  4. Patient will return to ADLs, IADLs and functional activities without increased symptoms or restriction. []? Progressing: []? Met: []? Not Met: []? Adjusted  5. Patient will tolerate sleeping on L side without increased symptoms for improved sleeping tolerance. []? Progressing: []? Met: []? Not Met: []? Adjusted    Overall Progression Towards Functional goals/ Treatment Progress Update:  [] Patient is progressing as expected towards functional goals listed. [] Progression is slowed due to complexities/Impairments listed. [] Progression has been slowed due to co-morbidities.   [x] Plan just implemented, too soon to assess goals progression <30days   [] Goals require adjustment due to lack of progress  [] Patient is not progressing as expected and requires additional follow up with physician  [] Other    Prognosis for POC: [x] Good [] Fair  [] Poor      Patient requires continued skilled intervention: [x] Yes  [] No    Treatment/Activity Tolerance:  [x] Patient able to complete treatment  [] Patient limited by fatigue  [] Patient limited by pain     [] Patient limited by other medical complications  [] Other: Pt had good tolerance to inferior joint mobs, mild restriction at end range noted. Pt exhibits very good improvement in PROM ABD by about 30 deg, motion also pain free this date compared to producing pain at LPV. Pt tolerated exercise program well, was able to increase weight on several exercises. Pt felt encouraged by lat pull down as she has been avoiding any activity that required bringing arms OH, was able to complete without any reproduction of pain or apprehension. Pt requires PT follow up to address ROM, strength and functional mobility deficits. PLAN: See eval  [x] Continue per plan of care [] Alter current plan (see comments above)  [] Plan of care initiated [] Hold pending MD visit [] Discharge      Electronically signed by:  Keya Galan, PT, DPT, OCS  Physical Therapist  [id number]  [email or web address]    Note: If patient does not return for scheduled/ recommended follow up visits, this note will serve as a discharge from care along with most recent update on progress.

## 2021-12-13 ENCOUNTER — OFFICE VISIT (OUTPATIENT)
Dept: ORTHOPEDIC SURGERY | Age: 61
End: 2021-12-13
Payer: COMMERCIAL

## 2021-12-13 VITALS — HEIGHT: 70 IN | BODY MASS INDEX: 26.2 KG/M2 | WEIGHT: 183 LBS

## 2021-12-13 DIAGNOSIS — M25.512 LEFT SHOULDER PAIN, UNSPECIFIED CHRONICITY: ICD-10-CM

## 2021-12-13 DIAGNOSIS — M75.02 ADHESIVE CAPSULITIS OF LEFT SHOULDER: Primary | ICD-10-CM

## 2021-12-13 PROCEDURE — 99213 OFFICE O/P EST LOW 20 MIN: CPT | Performed by: PHYSICIAN ASSISTANT

## 2021-12-13 NOTE — LETTER
Physical Therapy Rehabilitation Referral    Patient Name:  Kenzie Marroquin      YOB: 1960    Diagnosis:    1. Adhesive capsulitis of left shoulder    2. Left shoulder pain, unspecified chronicity        Precautions:     [x] Evaluate and Treat    Post Op Instructions:  [] Continuous passive motion (CPM) [] Elbow ROM  [x] Exercise in plane of scapula  []  Strengthening     [] Pulley and instruction   [x] Home exercise program (copy to patient)   [] Sling when arm at risk  [] Sling or brace at all times   [] AAROM: Forward elevation to  140            [] AAROM: External rotation  To  40    [] Isometric external rotator strengthening [] AAROM: internal rotation: up the back  [x] Isometric abductor strengthening  [] AAROM: Internal abduction   [] Isometric internal rotator strengthening [] AAROM: cross-body adduction             Stretching:     Strengthening:  [x] Four quadrant (FE, ER, IR, CBA)  [x] Rotator cuff (ER, IR, Abd)  [x] Forward Elevation    [] External Rotators     [x] External Rotation    [] Internal Rotators  [x] Internal Rotation: up/back   [] Abductors     [x] Internal Rotation: supine in abduction  [x] Sleeper Stretch    [] Flexors  [x] Cross-body abduction    [] Extensors  [x] Pendulum (FE, Abd/Add, cw/ccw)  [x] Scapular Stabilizers   [x] Wall-walking (FE, Abd)        [x] Shoulder shrugs     [x] Table slides (FE)                [x] Rhomboid pinch  [] Elbow (flex, ext, pron, sup)        [] Lat.  Pull downs     [] Medial epicondylitis program       [] Forward punch   [] Lateral epicondylitis program       [] Internal rotators     [] Progressive resistive exercises  [] Bench Press        [] Bench press plus  Activities:     [] Lateral pull-downs  [] Rowing     [] Progressive two-hand supine press  [] Stepper/Exercise bike   [] Biceps: curls/supination  [] Swimming  [] Water exercises    Modalities:     Return to Sport:  [x] Of Choice      [] Plyometrics  [] Ultrasound     [] Rhythmic stabilization  [] Iontophoresis    [] Core strengthening   [] Moist heat     [] Sports specific program:   [] Massage         [x] Cryotherapy      [] Electrical stimulation     [] Paraffin  [] Whirlpool  [] TENS    [x] Home exercise program (copy to patient). Perform exercises for:   15     minutes    3      times/day  [x] Supervised physical therapy  Frequency: []  1x week  [x] 2x week  [] 3x week  [] Other:   Duration: [] 2 weeks   [] 4 weeks  [x] 6 weeks  [] Other:     Additional Instructions:     Ender Lin MD, PhD

## 2021-12-16 ENCOUNTER — APPOINTMENT (OUTPATIENT)
Dept: PHYSICAL THERAPY | Age: 61
End: 2021-12-16
Payer: COMMERCIAL

## 2021-12-17 ENCOUNTER — HOSPITAL ENCOUNTER (OUTPATIENT)
Dept: PHYSICAL THERAPY | Age: 61
Setting detail: THERAPIES SERIES
Discharge: HOME | End: 2021-12-17
Payer: COMMERCIAL

## 2021-12-17 PROCEDURE — 97110 THERAPEUTIC EXERCISES: CPT | Performed by: PHYSICAL THERAPIST

## 2021-12-17 PROCEDURE — 97530 THERAPEUTIC ACTIVITIES: CPT | Performed by: PHYSICAL THERAPIST

## 2021-12-17 NOTE — PROGRESS NOTES
The [facility] and [address],  Sports Performance and Rehabilitation, [facility] [address]  [address] [facility],[address]   Alf [employer]  Phone: [phone]  Fax: [phone]    Physical Therapy Daily Treatment Note  Date:  2021    Patient Name:  Mike Amezcua    :  1960  MRN: [id number]  Restrictions/Precautions:    Medical/Treatment Diagnosis Information:  · Diagnosis: M25.512 (ICD-10-CM) - Left shoulder pain, unspecified [id number] (ICD-10-CM) - Adhesive capsulitis of left shoulder  · Treatment Diagnosis: M25.612, P15.5  Insurance/Certification information:  PT Insurance Information: PT BENEFITS  FACILITY/ MEDICAL MUTUAL/ EFFECTIVE 2020/ ACTIVE/ DED 1000 MET 1000/ PAYS 90%/ OOP 2000 MET 2000/ NO VISIT LIMIT MED NEC/ 0 AUTH/ ALL CODES BILLABLE CODE 49605 NOT COVERED/ PRESERVICE/ REF# [id number]/ 11- PAG  Physician Information:  Referring Practitioner: Sabina Zarco MD  Has the plan of care been signed (Y/N):        []  Yes  [x]  No     Date of Patient follow up with Physician: as needed      Is this a Progress Report:     []  Yes  [x]  No        If Yes:  Date Range for reporting period:  Beginning 21  Desvlb89/17/21    Progress report will be due (10 Rx or 30 days whichever is less):       Recertification will be due (POC Duration  / 90 days whichever is less): 22         Visit # Insurance Allowable Auth Required   IE + 4 NO LIMIT []  Yes []  No        Functional Scale:    Date assessed:  UEFI 76/80=95% (5% deficit)  21  UEFI 980/01=384% (0% deficit)  21    Latex Allergy:  [x]NO      []YES  Preferred Language for Healthcare:   [x]English       []other:    Pain level:  1-2/10      SUBJECTIVE:  Pt states that her shoulder has been feeling good, she is no longer fearful of the shoulder dislocating. She has been working really hard on ROM.  She was dancing last night and felt a twinge in her shoulder when during a spin, but overall feels she is making really good progress. Does still have mild pain when sleeping on L side. OBJECTIVE:              ROM PROM AROM  Comment     L R L R     Flexion     160 WNL     Abduction  170   170 WNL     ER  90   84 WNL     IR     60 WNL           Strength L R Comment   Flexion 4+/5       Abduction 4+/5       ER 4+/5       IR 4/5       Upper Trap         Lower Trap 4/5       Mid Trap 4/5       Biceps 4+/5       Triceps 4+/5          Special Tests Results/Comment   IRRST -   Posadas-Naveen -   Neers -   Painful Arc -   Drop Arm -   ER Lag Sign -   Empty Can / Jocelyn's -   Champagne Toast -   Speeds -   OBriens -   Cross body adduction -   Apprehension/Relocation +   Hornblower's -   Bicep Load II Not assessed         Reflexes/Sensation:               [x]? Dermatomes/Myotomes intact               []? Reflexes equal and normal bilaterally               []? Other:     Joint mobility:               []? Normal               [x]? Hypo              []? Hyper     Palpation: no TTP around [facility] joint     Functional Mobility/Transfers: Independent     Posture: Rounded shoulder    RESTRICTIONS/PRECAUTIONS:     Exercises/Interventions:     Exercise/Equipment Resistance/Repetitions Other comments   Stretching/PROM     Lat stretch Seated, arms on table for leverage   Cross body ADD    BB IR    Doorway              Isometrics     Retraction          Weight shift     Flexion     Abduction     External Rotation     Internal Rotation     Biceps     Triceps          PRE's     Flexion     Abduction     External Rotation 3x10 5# sidelying   Internal Rotation 3x10 8#    Shrugs     Wall push up  Half wall   Reverse Flys     Serratus     Horizontal Abd with ER     Biceps    Triceps    Retraction     Prone row    Prone ext    Prone T   11/23 Attempted- p!  And limited ROM    Hands behind head 10x5\" prone    Serratus foam roll slides 3x10    Prone plank 3x30\"         Cable Column/Theraband     External Rotation Internal Rotation     Shrugs     Lats 3x10 50#    Ext     Flex     Scapular Retraction 3x10 35#    BIC     TRIC     W ER     Supine HABD 3x10 blue         TRX     Reverse chest press 3x10              Dynamic Stability     Ball on wall 3x10 up/down  3x10 s/s 2# MB   Body blade     Wall walking 3 passes, red band         Plyoback          Manual interventions     Joint mobilization    PROM      Plan for next session: progress as tolerated, advance prone strength program    Therapeutic Exercise and NMR EXR  [x] (44454) Provided verbal/tactile cueing for activities related to strengthening, flexibility, endurance, ROM  for improvements in scapular, scapulothoracic and UE control with self care, reaching, carrying, lifting, house/yardwork, driving/computer work.    [] (48395) Provided verbal/tactile cueing for activities related to improving balance, coordination, kinesthetic sense, posture, motor skill, proprioception  to assist with  scapular, scapulothoracic and UE control with self care, reaching, carrying, lifting, house/yardwork, driving/computer work. Therapeutic Activities:    [] (85911 or 70075) Provided verbal/tactile cueing for activities related to improving balance, coordination, kinesthetic sense, posture, motor skill, proprioception and motor activation to allow for proper function of scapular, scapulothoracic and UE control with self care, carrying, lifting, driving/computer work. Home Exercise Program:   Access Code: Amita Berry: [email or web address]  Date: 12/17/2021  Prepared by: Bekah Gale     Exercises  · Prone Chest Stretch on Chair - 1 x daily - 4 x weekly - 1 sets - 3 reps - 30 second hold  · Standing Shoulder Internal Rotation Stretch with Hands Behind Back - 1 x daily - 4 x weekly - 1 sets - 3 reps - 30 second hold  · Standing Shoulder Posterior Capsule Stretch - 1 x daily - 4 x weekly - 1 sets - 3 reps - 30 second hold  · Doorway Pec Stretch at 60 Elevation - 1 x daily - 4 promoting relaxation,  increasing ROM, reducing/eliminating soft tissue swelling/inflammation/restriction, improving soft tissue extensibility and allowing for proper ROM for normal function with self care, reaching, carrying, lifting, house/yardwork, driving/computer work    Modalities:   None    Charges:  Timed Code Treatment Minutes: 45   Total Treatment Minutes: 45   Time in: 10:48  Time out: 11:35    [] EVAL (LOW) 81031 (typically 20 minutes face-to-face)  [] EVAL (MOD) 48542 (typically 30 minutes face-to-face)  [] EVAL (HIGH) 90751 (typically 45 minutes face-to-face)  [] RE-EVAL     [x] WT(47337) x2     [] IONTO  [] NMR (16492) x     [] VASO  [] Manual (62312) x      [] Other:  [x] TA x 1     [] Mech Traction (61846)  [] ES(attended) (45691)      [] ES (un) (82894):     GOALS:  Patient stated goal: decrease stiffness, improve strength, return to golf    [x]? Progressing: []? Met: []? Not Met: []? Adjusted     Therapist goals for Patient:   Short Term Goals: To be achieved in: 2-4 weeks 11/30/21-12/14/21  1. Independent in HEP and progression per patient tolerance, in order to prevent re-injury. []? Progressing: [x]? Met: []? Not Met: []? Adjusted   2. Patient will have a decrease in pain to facilitate improvement in movement, function, and ADLs as indicated by Functional Deficits. []? Progressing: [x]? Met: []? Not Met: []? Adjusted     3. Patient will demonstrate 10-15 deg improvement in AROM flex/abd/ER for improved functional mobility. []? Progressing: [x]? Met: []? Not Met: []? Adjusted      Long Term Goals: To be achieved in: 8 weeks 1/11/22  1. Disability index score of 0% for the UEFS to assist with reaching prior level of function. []? Progressing: []? Met: []? Not Met: []? Adjusted  2. Patient will demonstrate increased AROM to 170 flex/abd and 80 ER or greater to allow for proper joint functioning as indicated by patients Functional Deficits. []? Progressing: []? Met: []?  Not Met: []? Adjusted  3. Patient will demonstrate an increase in strength to 4+/5 or greater to allow for proper functional mobility as indicated by patients Functional Deficits. []? Progressing: []? Met: []? Not Met: []? Adjusted  4. Patient will return to ADLs, IADLs and functional activities without increased symptoms or restriction. []? Progressing: []? Met: []? Not Met: []? Adjusted  5. Patient will tolerate sleeping on L side without increased symptoms for improved sleeping tolerance. []? Progressing: []? Met: []? Not Met: []? Adjusted    Overall Progression Towards Functional goals/ Treatment Progress Update:  [] Patient is progressing as expected towards functional goals listed. [] Progression is slowed due to complexities/Impairments listed. [] Progression has been slowed due to co-morbidities. [x] Plan just implemented, too soon to assess goals progression <30days   [] Goals require adjustment due to lack of progress  [] Patient is not progressing as expected and requires additional follow up with physician  [] Other    Prognosis for POC: [x] Good [] Fair  [] Poor      Patient requires continued skilled intervention: [x] Yes  [] No    Treatment/Activity Tolerance:  [x] Patient able to complete treatment  [] Patient limited by fatigue  [] Patient limited by pain     [] Patient limited by other medical complications  [] Other: Pt demonstrates significant improvement in AROM since IE, she exhibits AROM WFL and PROM WNL. Pt demonstrates 1/2 MMT grade improvement since IE in regards to [facility] and corie-scap strength. Pt tolerated progression of corie-scap strength program this date. Pt demonstrates good form with exercise program. Able to complete exercises without exacerbation of shoulder symptoms, continues to be fatigued with program. Pt requires PT follow up to address ROM, strength and functional mobility deficits.                   PLAN: See eval  [x] Continue per plan of care [] Alter current plan (see comments above)  [] Plan of care initiated [] Hold pending MD visit [] Discharge      Electronically signed by:  Jazzmine Pretty PT, DPT, OCS  Physical Therapist  [id number]  [email or web address]    Note: If patient does not return for scheduled/ recommended follow up visits, this note will serve as a discharge from care along with most recent update on progress.

## 2021-12-20 ENCOUNTER — HOSPITAL ENCOUNTER (OUTPATIENT)
Dept: PHYSICAL THERAPY | Age: 61
Setting detail: THERAPIES SERIES
Discharge: HOME | End: 2021-12-20
Payer: COMMERCIAL

## 2021-12-20 PROCEDURE — 97110 THERAPEUTIC EXERCISES: CPT | Performed by: PHYSICAL THERAPIST

## 2021-12-20 PROCEDURE — 97530 THERAPEUTIC ACTIVITIES: CPT | Performed by: PHYSICAL THERAPIST

## 2021-12-20 NOTE — FLOWSHEET NOTE
The [address] [facility] and [address],  Sports Performance and Rehabilitation, [facility] 6199 1246 [address]  [address] [facility],5Th Floor   Alf Goodman  Phone: [phone]  Fax: [phone]    Physical Therapy Daily Treatment Note  Date:  2021    Patient Name:  Margareth Grigsby    :  1960  MRN: [id number]  Restrictions/Precautions:    Medical/Treatment Diagnosis Information:  · Diagnosis: M25.512 (ICD-10-CM) - Left shoulder pain, unspecified [id number] (ICD-10-CM) - Adhesive capsulitis of left shoulder  · Treatment Diagnosis: M25.612, I23.5  Insurance/Certification information:  PT Insurance Information: PT BENEFITS  FACILITY/ MEDICAL [employer]UTUAL/ EFFECTIVE 2020/ ACTIVE/ DED 1000 MET 1000/ PAYS 90%/ OOP 2000 MET 2000/ NO VISIT LIMIT MED NEC/ 0 AUTH/ ALL CODES BILLABLE CODE 64221 NOT COVERED/ PRESERVICE/ REF# [id number]/ 11- PAG  Physician Information:  Referring Practitioner: Melony Valenzuela MD  Has the plan of care been signed (Y/N):        []  Yes  [x]  No     Date of Patient follow up with Physician: as needed      Is this a Progress Report:     []  Yes  [x]  No        If Yes:  Date Range for reporting period:  Beginning 21  Otwhyw01/17/21    Progress report will be due (10 Rx or 30 days whichever is less):       Recertification will be due (POC Duration  / 90 days whichever is less): 22         Visit # Insurance Allowable Auth Required   IE + 5 NO LIMIT []  Yes []  No        Functional Scale:    Date assessed:  UEFI 76/80=95% (5% deficit)  21  UEFI 980/03=789% (0% deficit)  21    Latex Allergy:  [x]NO      []YES  Preferred Language for Healthcare:   [x]English       []other:    Pain level:  1-2/10      SUBJECTIVE:  Pt states that her shoulder feels good, no changes since LPV. Did not get to gym over weekend so was not able to do new exercises.     OBJECTIVE:              ROM PROM AROM  Comment     L R L R     Flexion     160 WNL   Abduction  170   170 WNL     ER  90   84 WNL     IR     60 WNL           Strength L R Comment   Flexion 4+/5       Abduction 4+/5       ER 4+/5       IR 4/5       Upper Trap         Lower Trap 4/5       Mid Trap 4/5       Biceps 4+/5       Triceps 4+/5          Special Tests Results/Comment   IRRST -   Posadas-Naveen -   Neers -   Painful Arc -   Drop Arm -   ER Lag Sign -   Empty Can / Jocelyn's -   Champagne Toast -   Speeds -   OBriens -   Cross body adduction -   Apprehension/Relocation +   Hornblower's -   Bicep Load II Not assessed         Reflexes/Sensation:               [x]? Dermatomes/Myotomes intact               []? Reflexes equal and normal bilaterally               []? Other:     Joint mobility:               []? Normal               [x]? Hypo              []? Hyper     Palpation: no TTP around [address] joint     Functional Mobility/Transfers: Independent     Posture: Rounded shoulder    RESTRICTIONS/PRECAUTIONS:     Exercises/Interventions:     Exercise/Equipment Resistance/Repetitions Other comments   Stretching/PROM     Lat stretch Seated, arms on table for leverage   Cross body ADD    BB IR    Doorway              Isometrics     Retraction          Weight shift     Flexion     Abduction     External Rotation     Internal Rotation     Biceps     Triceps          PRE's     Flexion     Abduction     External Rotation 3x10 6# sidelying   Internal Rotation 3x10 10#    Shrugs     Wall push up  Half wall   Reverse Flys     Serratus     Horizontal Abd with ER     Biceps    Triceps    Retraction     Prone row 3x10 3#   Prone ext    Prone T   11/23 Attempted- p!  And limited ROM    Hands behind head 10x5\" prone    Serratus foam roll slides 3x10    Prone plank 3x30\"         Cable Column/Theraband     External Rotation     Internal Rotation     Shrugs     Lats 3x10 60#    Ext     Flex     Scapular Retraction 3x10 40#    BIC     TRIC     W ER     Supine HABD          TRX     Reverse chest press 3x10 Dynamic Stability     Ball on wall 3x10 up/down  3x10 s/s 2# MB   Body blade 3x30\"  Cardio blade, arm at side   Wall walking 3 passes, red band         Plyoback          Manual interventions     Joint mobilization    PROM      Plan for next session: progress as tolerated, advance prone strength program    Therapeutic Exercise and NMR EXR  [x] (91562) Provided verbal/tactile cueing for activities related to strengthening, flexibility, endurance, ROM  for improvements in scapular, scapulothoracic and UE control with self care, reaching, carrying, lifting, house/yardwork, driving/computer work.    [] (96347) Provided verbal/tactile cueing for activities related to improving balance, coordination, kinesthetic sense, posture, motor skill, proprioception  to assist with  scapular, scapulothoracic and UE control with self care, reaching, carrying, lifting, house/yardwork, driving/computer work. Therapeutic Activities:    [] (72439 or 53282) Provided verbal/tactile cueing for activities related to improving balance, coordination, kinesthetic sense, posture, motor skill, proprioception and motor activation to allow for proper function of scapular, scapulothoracic and UE control with self care, carrying, lifting, driving/computer work. Home Exercise Program:   Access Code: Buck Almanza: [email or web address]  Date: 12/17/2021  Prepared by: Yazmin Zamorano     Exercises  · Prone Chest Stretch on Chair - 1 x daily - 4 x weekly - 1 sets - 3 reps - 30 second hold  · Standing Shoulder Internal Rotation Stretch with Hands Behind Back - 1 x daily - 4 x weekly - 1 sets - 3 reps - 30 second hold  · Standing Shoulder Posterior Capsule Stretch - 1 x daily - 4 x weekly - 1 sets - 3 reps - 30 second hold  · Doorway Pec Stretch at 60 Elevation - 1 x daily - 4 x weekly - 1 sets - 3 reps - 30 second hold  · Sidelying Shoulder ER with Towel and Dumbbell - 1 x daily - 4 x weekly - 1 sets - 10 reps  · Sidelying Shoulder Internal Rotation with Dumbbell - 1 x daily - 4 x weekly - 3 sets - 10 reps  · Supine Scapular Protraction in Flexion with Dumbbells - 1 x daily - 4 x weekly - 3 sets - 10 reps  · Prone Scapular Retraction with Hands Behind Head - 1 x daily - 4 x weekly - 1 sets - 10 reps - 5 second hold  · Prone Shoulder Row - 1 x daily - 4 x weekly - 3 sets - 10 reps  · Supine Shoulder Horizontal Abduction with Resistance - 1 x daily - 4 x weekly - 3 sets - 10 reps  · Shoulder External Rotation and Scapular Retraction with Resistance - 1 x daily - 4 x weekly - 3 sets - 10 reps  · Standing Wall Ball Circles with Mini Swiss Ball - 1 x daily - 4 x weekly - 3 sets - 10 reps  · Horizontal Wall Walk with Resistance - 1 x daily - 4 x weekly - 3 sets - 10 reps  · Serratus Activation at Wall with Foam Roll - 1 x daily - 4 x weekly - 3 sets - 10 reps  · Standard Plank - 1 x daily - 4 x weekly - 1 sets - 3 reps - 30 second hold  · Low Row with TRX® - 1 x daily - 4 x weekly - 3 sets - 10 reps  · Lat Pull Down - 1 x daily - 4 x weekly - 3 sets - 10 reps  · Seated Row Cable Machine - 1 x daily - 4 x weekly - 3 sets - 10 reps     [x] (22813) Reviewed/Progressed HEP activities related to strengthening, flexibility, endurance, ROM of scapular, scapulothoracic and UE control with self care, reaching, carrying, lifting, house/yardwork, driving/computer work  [] (06914) Reviewed/Progressed HEP activities related to improving balance, coordination, kinesthetic sense, posture, motor skill, proprioception of scapular, scapulothoracic and UE control with self care, reaching, carrying, lifting, house/yardwork, driving/computer work      Manual Treatments:    [] (75192) Provided manual therapy to mobilize soft tissue/joints of cervical/CT, scapular GHJ and UE for the purpose of modulating pain, promoting relaxation,  increasing ROM, reducing/eliminating soft tissue swelling/inflammation/restriction, improving soft tissue extensibility and allowing for proper ROM for normal function with self care, reaching, carrying, lifting, house/yardwork, driving/computer work    Modalities:   None    Charges:  Timed Code Treatment Minutes: 45   Total Treatment Minutes: 45   Time in: 5:00  Time out: 5:50    [] EVAL (LOW) 04546 (typically 20 minutes face-to-face)  [] EVAL (MOD) 27524 (typically 30 minutes face-to-face)  [] EVAL (HIGH) 49338 (typically 45 minutes face-to-face)  [] RE-EVAL     [x] TH(19002) x2     [] IONTO  [] NMR (59594) x     [] VASO  [] Manual (14063) x      [] Other:  [x] TA x 1     [] Mech Traction (33738)  [] ES(attended) (14275)      [] ES (un) (22543):     GOALS:  Patient stated goal: decrease stiffness, improve strength, return to golf    [x]? Progressing: []? Met: []? Not Met: []? Adjusted     Therapist goals for Patient:   Short Term Goals: To be achieved in: 2-4 weeks 11/30/21-12/14/21  1. Independent in HEP and progression per patient tolerance, in order to prevent re-injury. []? Progressing: [x]? Met: []? Not Met: []? Adjusted   2. Patient will have a decrease in pain to facilitate improvement in movement, function, and ADLs as indicated by Functional Deficits. []? Progressing: [x]? Met: []? Not Met: []? Adjusted     3. Patient will demonstrate 10-15 deg improvement in AROM flex/abd/ER for improved functional mobility. []? Progressing: [x]? Met: []? Not Met: []? Adjusted      Long Term Goals: To be achieved in: 8 weeks 1/11/22  1. Disability index score of 0% for the UEFS to assist with reaching prior level of function. []? Progressing: []? Met: []? Not Met: []? Adjusted  2. Patient will demonstrate increased AROM to 170 flex/abd and 80 ER or greater to allow for proper joint functioning as indicated by patients Functional Deficits. []? Progressing: []? Met: []? Not Met: []? Adjusted  3. Patient will demonstrate an increase in strength to 4+/5 or greater to allow for proper functional mobility as indicated by patients Functional Deficits.    []? Progressing: []? Met: []? Not Met: []? Adjusted  4. Patient will return to ADLs, IADLs and functional activities without increased symptoms or restriction. []? Progressing: []? Met: []? Not Met: []? Adjusted  5. Patient will tolerate sleeping on L side without increased symptoms for improved sleeping tolerance. []? Progressing: []? Met: []? Not Met: []? Adjusted    Overall Progression Towards Functional goals/ Treatment Progress Update:  [] Patient is progressing as expected towards functional goals listed. [] Progression is slowed due to complexities/Impairments listed. [] Progression has been slowed due to co-morbidities. [x] Plan just implemented, too soon to assess goals progression <30days   [] Goals require adjustment due to lack of progress  [] Patient is not progressing as expected and requires additional follow up with physician  [] Other    Prognosis for POC: [x] Good [] Fair  [] Poor      Patient requires continued skilled intervention: [x] Yes  [] No    Treatment/Activity Tolerance:  [x] Patient able to complete treatment  [] Patient limited by fatigue  [] Patient limited by pain     [] Patient limited by other medical complications  [] Other: Pt demonstrated good carry over from LPV, able to complete all exercises with proper form. Able to hold all 3 planks for 30\" this date. Continues to be fatigued with exercise program. Pt requires PT follow up to address ROM, strength and functional mobility deficits. PLAN: See eval  [x] Continue per plan of care [] Alter current plan (see comments above)  [] Plan of care initiated [] Hold pending MD visit [] Discharge      Electronically signed by:  Grace Mckenna, PT, DPT, OCS  Physical Therapist  [id number]  [email or web address]    Note: If patient does not return for scheduled/ recommended follow up visits, this note will serve as a discharge from care along with most recent update on progress.

## 2021-12-21 NOTE — PROGRESS NOTES
12 [facility]  History and Physical  Shoulder Pain      Name:  Tia Ortega  Address:  [facility] [address]   [place] [address]    :  1960      Age:   64 y.o.    SSN:  [id number]      Medical Record Number:  [id number]    Reason for Visit:    Follow-up (L shoulder)      HPI:   Tia Ortega is a 64 y.o. female who presents to our office today for follow up of the left shoulder pain. This patient has a history of instability however more recently has been dealing with adhesive capsulitis of the left shoulder. She was started on a trial of conservative management. They were able to get her started on formal physical therapy. She has been going to the [facility] office and has been working with [employer]. Overall the patient does feel that she has had an improvement and feels that therapy has been helpful for her. She was also prescribed meloxicam at her last visit. She denies any new injuries. Pain Assessment  Location of Pain: Shoulder  Location Modifiers: Left  Severity of Pain: 0  Result of Injury: No  Work-Related Injury: No  Are there other pain locations you wish to document?: No    ROS done 21        Review of Systems:  A 14 point review of systems available in the scanned medical record as documented by the patient. The review is negative with the exception of those things mentioned in the History of Present Illness and Past Medical History. Past Medical History:  Patient's medications, allergies, past medical, surgical, social and family histories were reviewed and updated as appropriate. Allergies: Allergies   Allergen Reactions    Penicillins Hives    Sulfa Antibiotics Hives       Physical Exam:  There were no vitals filed for this visit. General: Tia Ortega is a healthy and well appearing 64 y.o. female who is sitting comfortably in our office in acute distress. Skin:  There are no skin lesions, cellulitis, or extreme edema.  The patient has warm and well-perfused Bilateral upper extremities with brisk capillary refill. Eyes: Extra-ocular muscles intact  Mouth: Oral mucosa moist. No perioral lesions  Pulm: Respirations unlabored and regular. Neuro: Alert and oriented x3    left Shoulder Exam:  Inspection:  No gross deformities, no signs of infection. Palpation: She has no tenderness in the bicipital groove or AC joint. Mild tenderness of the rotator cuff footprint. Active Range of Motion: Forward elevation of 170, abduction of 170, external rotation with elbow at the side 70 versus 60 on the right, internal rotation to the back is T6 versus T4 on the right    Passive Range of Motion: Deferred    Strength: External rotation with resistance with elbow at the side +4/5, internal rotation with resistance with elbow at the side +4/5    Special Tests:   No Emmanuel muscle deformity. Neurovascular: Sensation to light touch is intact, no motor deficits, palpable radial pulses 2+    Additional Examinations:    Examination of the contralateral extremity does not show any tenderness, deformity or injury. Range of motion is unremarkable. There is no gross instability. There are no rashes, ulcerations or lesions. Strength and tone are normal.      Radiographic:  X-ray not obtained    Assessment:  Prasanna Rojas is a 64 y.o. female with a history of shoulder instability currently struggling with left shoulder adhesive capsulitis that has been responding well to a trial of conservative management. .    Impression:  Encounter Diagnoses   Name Primary?  Adhesive capsulitis of left shoulder Yes    Left shoulder pain, unspecified chronicity        Office Procedures:  No orders of the defined types were placed in this encounter. Plan: At this point we feel that she is headed in the right direction. Her movement is almost nearly recovered. We will have her continue to work with Aleyda Tran to recover the last bit of her movement and strength. These therapy orders were placed in the chart today. She can certainly return back to our office in 6 weeks if her symptoms continue to worsen however if she is as good as she is today she was asked to go ahead and cancel the appointment. All her questions were fully answered we will see her back on an as-needed basis. Basia Rolon PA-C  Orthopaedic Sports Medicine Physician Assistant         This dictation was performed with a verbal recognition program [facility]) and it was checked for errors. It is possible that there are still dictated errors within this office note. If so, please bring any errors to my attention for an addendum. All efforts were made to ensure that this office note is accurate.

## 2021-12-28 ENCOUNTER — HOSPITAL ENCOUNTER (OUTPATIENT)
Dept: PHYSICAL THERAPY | Age: 61
Setting detail: THERAPIES SERIES
Discharge: HOME | End: 2021-12-28
Payer: COMMERCIAL

## 2021-12-28 NOTE — FLOWSHEET NOTE
The [place] [facility] [address] [address]    Physical Therapy  Cancellation/No-show Note  Patient Name:  Enrique Atkinson  :  1960   Date:  2021  Cancelled visits to date: 0  No-shows to date: 1    For today's appointment patient:  []  Cancelled  []  Rescheduled appointment  [x]  No-show     Reason given by patient:  []  Patient ill  []  Conflicting appointment   []  No transportation    []  Conflict with work  []  No reason given  []  Other:     Comments:      Electronically signed by:  Peter Ricardo, PT, DPT, OCS  Physical Therapist  [id number]  [email or web address]

## 2022-01-10 DIAGNOSIS — E78.2 MIXED HYPERLIPIDEMIA: ICD-10-CM

## 2022-01-11 RX ORDER — ROSUVASTATIN CALCIUM 10 MG/1
TABLET, COATED ORAL
Qty: 90 TABLET | Refills: 0 | Status: SHIPPED | OUTPATIENT
Start: 2022-01-11 | End: 2022-04-25

## 2022-01-11 NOTE — TELEPHONE ENCOUNTER
Medication:   Requested Prescriptions     Pending Prescriptions Disp Refills    rosuvastatin (CRESTOR) 10 MG tablet [Pharmacy Med Name: ROSUVASTATIN CALCIUM 10MG TABS] 90 tablet 0     Sig: TAKE 1 TABLET BY MOUTH ONE TIME A DAY        Last Filled: 7/2/2021   Last appt: 8/4/2021   Next appt: none

## 2022-02-25 ENCOUNTER — OFFICE VISIT (OUTPATIENT)
Dept: SURGERY | Age: 62
End: 2022-02-25
Payer: COMMERCIAL

## 2022-02-25 VITALS
HEART RATE: 61 BPM | BODY MASS INDEX: 28.52 KG/M2 | DIASTOLIC BLOOD PRESSURE: 77 MMHG | SYSTOLIC BLOOD PRESSURE: 111 MMHG | TEMPERATURE: 96.8 F | WEIGHT: 199.2 LBS | HEIGHT: 70 IN

## 2022-02-25 DIAGNOSIS — M79.89 SOFT TISSUE MASS: Primary | ICD-10-CM

## 2022-02-25 PROCEDURE — 99203 OFFICE O/P NEW LOW 30 MIN: CPT | Performed by: SURGERY

## 2022-02-25 NOTE — LETTER
[facility] of Chava Rey M.D. [facility]  [address] [address] [facility]. [facility], [address]  Phone: [phone] Fax: [phone]            Surgery Order/Time:  22/1:18 PM  Conf. # _________________  Scheduled by: Main [facility] L[address]                                **Pre-Surgical Orders in [facility]**  Facility: [facility]    Surgery Date: 2022 Time: 915am    Pt arrival: 0am  Second Surgeon: N/A        Patient Name: Iliana Rico  : 1960  Home [phone] (home)  Address:[address] [place] [address]  PCP:  Wolfgang Ortega MD   Does patient speak English?: yes    needed? no                                             PROCEDURE: Excision of soft tissue mass of the back  CPT: 70643: Excision, tumor, soft tissue of back or flank   DIAGNOSIS:      ICD-10-CM    1. Soft tissue mass  M79.89        Anesthesia: MAC  Time Needed:  45 minutes   Pt Position:  lateral, left side up      Outpatient __x__ Admit ______  Assist._____   Cardiac Clearance: no  Patient to meet with Anesthesiology prior to surgery: no    Patient Allergies: Allergies   Allergen Reactions    Penicillins Hives    Sulfa Antibiotics Hives     Pre-Op H&P to be done by: Wolfgang Ortega MD  Pre-Op Antibiotics: Clindamycin 600mg IV on call to OR      Physician: Yas Recinos MD Date: 22             Insurance:     ID #      Ph #   Date called ________________   Ashley Starch to: _____________ Precert Needed?  Yes  /  No  PreAuth # & Details   ______________________________________________________________________

## 2022-02-25 NOTE — PROGRESS NOTES
PATIENT NAME: Catherine Loomis     YOB: 1960     TODAY'S DATE: 2/25/2022    Reason for Visit:  Lipoma of back    Requesting Physician:  Ovidio Puga MD    HISTORY OF PRESENT ILLNESS:  Anali Weiner is a 64 y.o. female with a PMHx as delineated below who presents for evaluation of a back lipoma. The patient reports that she previously underwent excision of a lipoma approximately 2 years ago in the Dermatology office in the same location. She states that the lipoma recurred shortly thereafter and has been increasing in size since. The lipoma is mildly tender. She reports no skin issues in the area, no erythema, and no drainage from the skin.      REVIEW OF SYSTEMS:  CONSTITUTIONAL:  negative  HEENT:  negative  RESPIRATORY:  negative  CARDIOVASCULAR:  negative  GASTROINTESTINAL:  negative  GENITOURINARY:  negative  HEMATOLOGIC/LYMPHATIC:  negative  MUSCULOSKELETAL: negative  NEUROLOGICAL:  negative    PMH  Past Medical History:   Diagnosis Date    Allergic rhinitis     Chicken pox     Chondromalacia patellae, left knee     Dyspareunia     HPV (human papilloma virus) anogenital infection     Hyperlipidemia     PONV (postoperative nausea and vomiting)     Recurrent cold sores     STD (sexually transmitted disease)     HPV     PSH  Past Surgical History:   Procedure Laterality Date    COLONOSCOPY  8/28/2020    COLONOSCOPY WITH BIOPSY performed by Mj Holt MD at [place] [id number] ARTHROSCOPY Left 07/26/2018    LEEP      SKIN TAG REMOVAL      TOOTH EXTRACTION      UPPER GASTROINTESTINAL ENDOSCOPY N/A 8/28/2020    EGD BIOPSY performed by Mj Holt MD at [facility] N/A 8/28/2020    EGD Joycie Davida performed by Mj Holt MD at [facility] N/A 12/4/2020    ESOPHAGOGASTRODUODENOSCOPY performed by Cathy Sofia MD at [facility] History  Social History     Socioeconomic History    Marital status:      Spouse name: Not on file    Number of children: Not on file    Years of education: Not on file    Highest education level: Not on file   Occupational History    Not on file   Tobacco Use    Smoking status: Never Smoker    Smokeless tobacco: Never Used   Vaping Use    Vaping Use: Never used   Substance and Sexual Activity    Alcohol use: Yes     Alcohol/week: 8.0 standard drinks     Types: 4 Cans of beer, 4 Standard drinks or equivalent per week     Comment: occasionally    Drug use: No    Sexual activity: Yes     Partners: Male   Other Topics Concern    Not on file   Social History Narrative    Not on file     Social Determinants of Health     Financial Resource Strain:     Difficulty of Paying Living Expenses: Not on file   Food Insecurity:     Worried About Running Out of Food in the Last Year: Not on file    Felecia of Food in the Last Year: Not on file   Transportation Needs:     Lack of Transportation (Medical): Not on file    Lack of Transportation (Non-Medical):  Not on file   Physical Activity:     Days of Exercise per Week: Not on file    Minutes of Exercise per Session: Not on file   Stress:     Feeling of Stress : Not on file   Social Connections:     Frequency of Communication with Friends and Family: Not on file    Frequency of Social Gatherings with Friends and Family: Not on file    Attends Congregation Services: Not on file    Active Member of Clubs or Organizations: Not on file    Attends Club or Organization Meetings: Not on file    Marital Status: Not on file   Intimate Partner Violence:     Fear of Current or Ex-Partner: Not on file    Emotionally Abused: Not on file    Physically Abused: Not on file    Sexually Abused: Not on file   Housing Stability:     Unable to Pay for Housing in the Last Year: Not on file    Number of [place] in the Last Year: Not on file    Unstable Housing in the Last Year: Not on file       Family History:       Problem Relation Age of Onset    High Blood Pressure Mother     High Cholesterol Mother     Depression Mother     Arthritis Father     Heart Disease Father     High Blood Pressure Father     High Cholesterol Father     Kidney Disease Father     Cancer Father         testicle    Heart Disease Maternal Grandfather     Heart Disease Paternal Grandfather     Heart Disease Brother         MI at 48    High Cholesterol Brother     High Cholesterol Sister     Rheum Arthritis Neg Hx     Osteoarthritis Neg Hx     Asthma Neg Hx     Breast Cancer Neg Hx     Diabetes Neg Hx     Heart Failure Neg Hx     Hypertension Neg Hx     Migraines Neg Hx     Ovarian Cancer Neg Hx     Rashes/Skin Problems Neg Hx     Seizures Neg Hx     Stroke Neg Hx     Thyroid Disease Neg Hx        Allergy:   Allergies   Allergen Reactions    Penicillins Hives    Sulfa Antibiotics Hives     PHYSICAL EXAM:  VITALS:  /77   Pulse 61   Temp 96.8 °F (36 °C)   Ht 5' 10\" (1.778 m)   Wt 199 lb 3.2 oz (90.4 kg)   LMP 07/28/2012   BMI 28.58 kg/m²     General appearance: alert; no acute distress; grooming appropriate  HEENT: Normocephalic/atraumatic; PERRL; no scleral icterus; trachea midline; no JVD; no lymphadenopathy  Chest/Lungs: Normal effort; breathing room air  Back: Left upper back with approximately 3cm x 2cm non-tender, soft, mobile mass under a well-healed scar from previous removal.   Cardiovascular: Regular rate and rhythm  Abdomen: Non-distended; soft; non-tender  Skin: warm and dry; no exanthems  Extremities: no edema; no cyanosis  Neuro: A&Ox3; no focal deficits; sensation intact    DATA:  Radiology Review:    Narrative   Left chest ultrasound: 11/24/2021       HISTORY: Visible mass left upper back       Comments:       Evaluation the area of visible concern demonstrates a 2.4 x 1.7 x 1.3 cm heterogeneously hypoechoic solid lesion.  This is indeterminate by ultrasound. Further evaluation may be warranted. Pathology review:   Dermatology Pathology 7/21/2020:  Right upper back-   Dysplastic nevus with moderate dysplasia, narrowly excised   There is proliferation of focally atypical melanocytes at   the junction, singly and in nests that focally bridge   between ridges.  The underlying dermis shows fibroplasia   and patchy lymphocytic infiltration.  The dysplasia is   moderate.  The changes are occurring within a junction   nevus.  The lesion is narrowly excised. Dermatology Pathology 10/11/2018:  Left upper back-   Lipoma, completely excised   The specimen consists of a nodule composed of large lobules   of mature fat tissue. IMPRESSION/RECOMMENDATIONS:  Humphrey Waldron is a 64 y.o. female who underwent previous lipoma removal in her Dermatologist office in 2018, shortly after which she experienced recurrence of a soft tissue mass similar to that which was present prior to excision. The patient was recommended to undergo re-excision in the OR setting. She wishes to proceed after discussion of the risks, benefits, and alternatives. - Schedule for excision of soft tissue mass, left upper back    Hussein Pinedo MD MPH  General Surgery PGY3  2/25/2022    I have seen, examined, and reviewed the patients chart. I agree with the residents assessment and have made appropriate changes.     Kelly Holt

## 2022-03-01 ENCOUNTER — TELEPHONE (OUTPATIENT)
Dept: SURGERY | Age: 62
End: 2022-03-01

## 2022-03-02 NOTE — TELEPHONE ENCOUNTER
Patient seen on 2/25/22 surgery letter received Excision of soft tissue mass of the back 45 min OR time needed under MAC.     Covid vaccinated     Pre op instructions reviewed including the need for a H&P with a EXG, 18 or older  for the day of surgery   Pre op packet mailed     Post op appt scheduled     Faxed to scheduling     Placed on [facility] and [facility]

## 2022-04-07 NOTE — PROGRESS NOTES
Place patient label inside box (if no patient label, complete below)  Name:  :  MR#:   Faustina Sides / PROCEDURE  1. I (we), Filiberto Odonnell (Patient Name) authorize Curt Conrad MD (Provider / Ric Munoz) and/or such assistants as may be selected by him/her, to perform the following operation/procedure(s): EXCISION OF SOFT TISSUE MASS OF THE BACK       Note: If unable to obtain consent prior to an emergent procedure, document the emergent reason in the medical record. This procedure has been explained to my (our) satisfaction and included in the explanation was:  A) The intended benefit, nature, and extent of the procedure to be performed;  B) The significant risks involved and the probability of success;  C) Alternative procedures and methods of treatment;  D) The dangers and probable consequences of such alternatives (including no procedure or treatment); E) The expected consequences of the procedure on my future health;  F) Whether other qualified individuals would be performing important surgical tasks and/or whether  would be present to advise or support the procedure. I (we) understand that there are other risks of infection and other serious complications in the pre-operative/procedural and postoperative/procedural stages of my (our) care. I (we) have asked all of the questions which I (we) thought were important in deciding whether or not to undergo treatment or diagnosis. These questions have been answered to my (our) satisfaction. I (we) understand that no assurance can be given that the procedure will be a success, and no guarantee or warranty of success has been given to me (us). 2. It has been explained to me (us) that during the course of the operation/procedure, unforeseen conditions may be revealed that necessitate extension of the original procedure(s) or different procedure(s) than those set forth in Paragraph 1.  I (we) authorize and request that the above-named physician, his/her assistants or his/her designees, perform procedures as necessary and desirable if deemed to be in my (our) best interest.     Revised 8/2/2021                                                                          Page 1 of 2                   3. I acknowledge that health care personnel may be observing this procedure for the purpose of medical education or other specified purposes as may be necessary as requested and/or approved by my (our) physician. 4. I (we) consent to the disposal by the hospital Pathologist of the removed tissue, parts or organs in accordance with hospital policy. 5. I do ____ do not ____ consent to the use of a local infiltration pain blocking agent that will be used by my provider/surgical provider to help alleviate pain during my procedure. 6. I do ____ do not ____ consent to an emergent blood transfusion in the case of a life-threatening situation that requires blood components to be administered. This consent is valid for 24 hours from the beginning of the procedure. 7. This patient does ____ or does not ____ currently have a DNR status/order. If DNR order is in place, obtain Addendum to the Surgical Consent for ALL Patients with a DNR Order to address corie-operative status for limited intervention or DNR suspension.      8. I have read and fully understand the above Consent for Operation/Procedure and that all blanks were completed before I signed the consent.   _____________________________       _____________________      ____/____am/pm  Signature of Patient or legal representative      Printed Name / Relationship            Date / Time   ____________________________       _____________________      ____/____am/pm  Witness to Signature                                    Printed Name                    Date / Time     If patient is unable to sign or is a minor, complete the following)  Patient is a minor, ____ years of age, or unable to sign because:   ______________________________________________________________________________________________    Lalito Southern If a phone consent is obtained, consent will be documented by using two health care professionals, each affirming that the consenting party has no questions and gives consent for the procedure discussed with the physician/provider.   _____________________          ____________________       _____/_____am/pm   2nd witness to phone consent        Printed name           Date / Time    Informed Consent:  I have provided the explanation described above in section 1 to the patient and/or legal representative.  I have provided the patient and/or legal representative with an opportunity to ask any questions about the proposed operation/procedure.   ___________________________          ____________________         ____/____am/pm  Provider / Proceduralist                            Printed name            Date / Time  Revised 8/2/2021                                                                      Page 2 of 2

## 2022-04-07 NOTE — PROGRESS NOTES
PRE-OP INSTRUCTIONS FOR THE SURGICAL PATIENT YOU ARE UNABLE TO MAKE CONTACT FOR AN INTERVIEW:        1. Follow all instructions provided to you from your surgeons office, including your ARRIVAL TIME. 2. Enter the MAIN entrance located on [address] and report to the desk. 3. Bring your insurance & photo ID with you. You may also be asked to pay a co-pay, as you may want to bring a check or credit card with you. 4. Leave all other valuables at home. 5. Arrange for someone to drive you home and be with you for the first 24 hours after discharge. 6. Bring your medication list with you day of surgery with doses and frequency listed (including over the counter medications)  7. You must contact your surgeon for Instructions regarding:              - ALL medication instructions, especially if taking blood thinners, aspirin, or diabetic medication.         -Bariatric patients call surgeon if on diabetic medications as some need to be stopped 1 week preop  - IF  there is a change in your physical condition such as a cold, fever, rash, cuts, sores or any other infection, especially near your surgical site. 8. A Pre-op History and Physical for surgery MUST be completed by your Physician or an Urgent Care within 30 days of your procedure date. Please bring a copy with you on the day of your procedure and along with any other testing performed. 9. DO NOT EAT ANYTHING eight hours prior to arrival for surgery. May have up to 8 ounces of water 4 hours prior to arrival for surgery. NOTE: ALL Gastric, Bariatric and Bowel surgery patients MUST follow their surgeon's instructions. 10. No gum, candy, mints, or ice chips day of procedure. 11. Please refrain from drinking alcohol the day before or day of your procedure. 12. Please do not smoke the day of your procedure. 13. Dress in loose, comfortable clothing appropriate for redressing after your procedure.  Do not wear jewelry (including

## 2022-04-11 ASSESSMENT — PATIENT HEALTH QUESTIONNAIRE - PHQ9
2. FEELING DOWN, DEPRESSED OR HOPELESS: 0
1. LITTLE INTEREST OR PLEASURE IN DOING THINGS: NOT AT ALL
2. FEELING DOWN, DEPRESSED OR HOPELESS: NOT AT ALL
SUM OF ALL RESPONSES TO PHQ QUESTIONS 1-9: 0
SUM OF ALL RESPONSES TO PHQ9 QUESTIONS 1 & 2: 0
SUM OF ALL RESPONSES TO PHQ QUESTIONS 1-9: 0
SUM OF ALL RESPONSES TO PHQ QUESTIONS 1-9: 0
1. LITTLE INTEREST OR PLEASURE IN DOING THINGS: 0
SUM OF ALL RESPONSES TO PHQ QUESTIONS 1-9: 0
SUM OF ALL RESPONSES TO PHQ9 QUESTIONS 1 & 2: 0

## 2022-04-11 NOTE — PROGRESS NOTES
4/11/22 @ 1250 Pt returned call and telephone interview completed. Pt verbalizes understanding of PAT instructions.   Susy Denise

## 2022-04-13 ENCOUNTER — OFFICE VISIT (OUTPATIENT)
Dept: FAMILY MEDICINE CLINIC | Age: 62
End: 2022-04-13
Payer: COMMERCIAL

## 2022-04-13 VITALS
WEIGHT: 177 LBS | SYSTOLIC BLOOD PRESSURE: 110 MMHG | DIASTOLIC BLOOD PRESSURE: 76 MMHG | HEART RATE: 80 BPM | HEIGHT: 69 IN | OXYGEN SATURATION: 98 % | BODY MASS INDEX: 26.22 KG/M2

## 2022-04-13 DIAGNOSIS — Z00.00 WELL ADULT EXAM: ICD-10-CM

## 2022-04-13 DIAGNOSIS — Z00.00 WELL ADULT EXAM: Primary | ICD-10-CM

## 2022-04-13 DIAGNOSIS — E78.2 MIXED HYPERLIPIDEMIA: ICD-10-CM

## 2022-04-13 DIAGNOSIS — Z01.818 PREOP EXAMINATION: ICD-10-CM

## 2022-04-13 LAB
A/G RATIO: 1.6 (ref 1.1–2.2)
ALBUMIN SERPL-MCNC: 4.3 G/DL (ref 3.4–5)
ALP BLD-CCNC: 63 U/L (ref 40–129)
ALT SERPL-CCNC: 21 U/L (ref 10–40)
ANION GAP SERPL CALCULATED.3IONS-SCNC: 15 MMOL/L (ref 3–16)
AST SERPL-CCNC: 20 U/L (ref 15–37)
BILIRUB SERPL-MCNC: 0.6 MG/DL (ref 0–1)
BUN BLDV-MCNC: 13 MG/DL (ref 7–20)
CALCIUM SERPL-MCNC: 9.9 MG/DL (ref 8.3–10.6)
CHLORIDE BLD-SCNC: 100 MMOL/L (ref 99–110)
CHOLESTEROL, TOTAL: 166 MG/DL (ref 0–199)
CO2: 25 MMOL/L (ref 21–32)
CREAT SERPL-MCNC: 0.8 MG/DL (ref 0.6–1.2)
GFR AFRICAN AMERICAN: >60
GFR NON-AFRICAN AMERICAN: >60
GLUCOSE BLD-MCNC: 81 MG/DL (ref 70–99)
HDLC SERPL-MCNC: 36 MG/DL (ref 40–60)
LDL CHOLESTEROL CALCULATED: 103 MG/DL
POTASSIUM SERPL-SCNC: 4.4 MMOL/L (ref 3.5–5.1)
SODIUM BLD-SCNC: 140 MMOL/L (ref 136–145)
TOTAL PROTEIN: 7 G/DL (ref 6.4–8.2)
TRIGL SERPL-MCNC: 135 MG/DL (ref 0–150)

## 2022-04-13 PROCEDURE — 99396 PREV VISIT EST AGE 40-64: CPT | Performed by: FAMILY MEDICINE

## 2022-04-13 SDOH — ECONOMIC STABILITY: FOOD INSECURITY: WITHIN THE PAST 12 MONTHS, YOU WORRIED THAT YOUR FOOD WOULD RUN OUT BEFORE YOU GOT MONEY TO BUY MORE.: NEVER TRUE

## 2022-04-13 SDOH — ECONOMIC STABILITY: FOOD INSECURITY: WITHIN THE PAST 12 MONTHS, THE FOOD YOU BOUGHT JUST DIDN'T LAST AND YOU DIDN'T HAVE MONEY TO GET MORE.: NEVER TRUE

## 2022-04-13 ASSESSMENT — ENCOUNTER SYMPTOMS: RESPIRATORY NEGATIVE: 1

## 2022-04-13 ASSESSMENT — SOCIAL DETERMINANTS OF HEALTH (SDOH): HOW HARD IS IT FOR YOU TO PAY FOR THE VERY BASICS LIKE FOOD, HOUSING, MEDICAL CARE, AND HEATING?: NOT HARD AT ALL

## 2022-04-13 NOTE — PROGRESS NOTES
Topics Concern    None   Social History Narrative    None     Social Determinants of Health     Financial Resource Strain: Low Risk     Difficulty of Paying Living Expenses: Not hard at all   Food Insecurity: No Food Insecurity    Worried About Running Out of Food in the Last Year: Never true    Felecia of Food in the Last Year: Never true   Transportation Needs:     Lack of Transportation (Medical): Not on file    Lack of Transportation (Non-Medical):  Not on file   Physical Activity:     Days of Exercise per Week: Not on file    Minutes of Exercise per Session: Not on file   Stress:     Feeling of Stress : Not on file   Social Connections:     Frequency of Communication with Friends and Family: Not on file    Frequency of Social Gatherings with Friends and Family: Not on file    Attends Mandaen Services: Not on file    Active Member of [address] [place] [address] or Organizations: Not on file    Attends Club or Organization Meetings: Not on file    Marital Status: Not on file   Intimate Partner Violence:     Fear of Current or Ex-Partner: Not on file    Emotionally Abused: Not on file    Physically Abused: Not on file    Sexually Abused: Not on file   Housing Stability:     Unable to Pay for Housing in the Last Year: Not on file    Number of [place] in the Last Year: Not on file    Unstable Housing in the Last Year: Not on file       Social History     Substance and Sexual Activity   Drug Use No       Family History   Problem Relation Age of Onset    High Blood Pressure Mother     High Cholesterol Mother     Depression Mother     Arthritis Father     Heart Disease Father     High Blood Pressure Father     High Cholesterol Father     Kidney Disease Father     Cancer Father         testicle    Heart Disease Maternal Grandfather     Heart Disease Paternal Grandfather     Heart Disease Brother         MI at 48    High Cholesterol Brother     High Cholesterol Sister     Rheum Arthritis Neg Hx     Osteoarthritis Neg Hx     Asthma Neg Hx     Breast Cancer Neg Hx     Diabetes Neg Hx     Heart Failure Neg Hx     Hypertension Neg Hx     Migraines Neg Hx     Ovarian Cancer Neg Hx     Rashes/Skin Problems Neg Hx     Seizures Neg Hx     Stroke Neg Hx     Thyroid Disease Neg Hx       Review of Systems   Constitutional: Negative. Respiratory: Negative. Cardiovascular: Negative. Hematological: Does not bruise/bleed easily. Objective   Physical Exam  Constitutional:       Appearance: Normal appearance. She is well-developed. HENT:      Head: Normocephalic. Eyes:      General: No scleral icterus. Conjunctiva/sclera: Conjunctivae normal.   Neck:      Thyroid: No thyromegaly. Cardiovascular:      Rate and Rhythm: Normal rate. Heart sounds: Normal heart sounds. No murmur heard. No gallop. Pulmonary:      Effort: Pulmonary effort is normal.      Breath sounds: Normal breath sounds. No wheezing. Chest:   Breasts:      Right: No supraclavicular adenopathy. Left: No supraclavicular adenopathy. Abdominal:      General: There is no distension. Palpations: Abdomen is soft. There is no hepatomegaly or splenomegaly. Tenderness: There is no abdominal tenderness. Musculoskeletal:      Cervical back: Normal range of motion and neck supple. Lymphadenopathy:      Head:      Right side of head: No submandibular adenopathy. Left side of head: No submandibular adenopathy. Cervical: No cervical adenopathy. Upper Body:      Right upper body: No supraclavicular adenopathy. Left upper body: No supraclavicular adenopathy. Skin:     General: Skin is warm and dry. Nails: There is no clubbing. Neurological:      Mental Status: She is alert and oriented to person, place, and time. Cranial Nerves: No cranial nerve deficit. Deep Tendon Reflexes:      Reflex Scores:       Bicep reflexes are 2+ on the right side and 2+ on the left side. Patellar reflexes are 2+ on the right side and 2+ on the left side. Psychiatric:         Behavior: Behavior normal.              An electronic signature was used to authenticate this note.     --Merly Boggs MD

## 2022-04-18 ENCOUNTER — ANESTHESIA EVENT (OUTPATIENT)
Dept: OPERATING ROOM | Age: 62
End: 2022-04-18
Payer: COMMERCIAL

## 2022-04-19 ENCOUNTER — ANESTHESIA (OUTPATIENT)
Dept: OPERATING ROOM | Age: 62
End: 2022-04-19
Payer: COMMERCIAL

## 2022-04-19 ENCOUNTER — HOSPITAL ENCOUNTER (OUTPATIENT)
Age: 62
Setting detail: OUTPATIENT SURGERY
Discharge: HOME | End: 2022-04-19
Attending: SURGERY | Admitting: SURGERY
Payer: COMMERCIAL

## 2022-04-19 VITALS
RESPIRATION RATE: 10 BRPM | DIASTOLIC BLOOD PRESSURE: 52 MMHG | OXYGEN SATURATION: 97 % | SYSTOLIC BLOOD PRESSURE: 95 MMHG

## 2022-04-19 VITALS
HEART RATE: 61 BPM | OXYGEN SATURATION: 94 % | HEIGHT: 69 IN | RESPIRATION RATE: 16 BRPM | TEMPERATURE: 97.1 F | DIASTOLIC BLOOD PRESSURE: 74 MMHG | WEIGHT: 177.4 LBS | BODY MASS INDEX: 26.28 KG/M2 | SYSTOLIC BLOOD PRESSURE: 110 MMHG

## 2022-04-19 DIAGNOSIS — G89.18 POST-OP PAIN: Primary | ICD-10-CM

## 2022-04-19 DIAGNOSIS — M79.89 SOFT TISSUE MASS: ICD-10-CM

## 2022-04-19 PROCEDURE — 2500000003 HC RX 250 WO HCPCS: Performed by: SURGERY

## 2022-04-19 PROCEDURE — 3600000003 HC SURGERY LEVEL 3 BASE: Performed by: SURGERY

## 2022-04-19 PROCEDURE — 7100000011 HC PHASE II RECOVERY - ADDTL 15 MIN: Performed by: SURGERY

## 2022-04-19 PROCEDURE — 88304 TISSUE EXAM BY PATHOLOGIST: CPT

## 2022-04-19 PROCEDURE — 21931 EXC BACK LES SC 3 CM/>: CPT | Performed by: SURGERY

## 2022-04-19 PROCEDURE — 7100000010 HC PHASE II RECOVERY - FIRST 15 MIN: Performed by: SURGERY

## 2022-04-19 PROCEDURE — 2500000003 HC RX 250 WO HCPCS: Performed by: NURSE ANESTHETIST, CERTIFIED REGISTERED

## 2022-04-19 PROCEDURE — 2580000003 HC RX 258: Performed by: SURGERY

## 2022-04-19 PROCEDURE — 2580000003 HC RX 258: Performed by: ANESTHESIOLOGY

## 2022-04-19 PROCEDURE — 7100000000 HC PACU RECOVERY - FIRST 15 MIN: Performed by: SURGERY

## 2022-04-19 PROCEDURE — 2709999900 HC NON-CHARGEABLE SUPPLY: Performed by: SURGERY

## 2022-04-19 PROCEDURE — A4217 STERILE WATER/SALINE, 500 ML: HCPCS | Performed by: SURGERY

## 2022-04-19 PROCEDURE — 7100000001 HC PACU RECOVERY - ADDTL 15 MIN: Performed by: SURGERY

## 2022-04-19 PROCEDURE — 6360000002 HC RX W HCPCS: Performed by: NURSE ANESTHETIST, CERTIFIED REGISTERED

## 2022-04-19 PROCEDURE — 3700000001 HC ADD 15 MINUTES (ANESTHESIA): Performed by: SURGERY

## 2022-04-19 PROCEDURE — 3700000000 HC ANESTHESIA ATTENDED CARE: Performed by: SURGERY

## 2022-04-19 PROCEDURE — 3600000013 HC SURGERY LEVEL 3 ADDTL 15MIN: Performed by: SURGERY

## 2022-04-19 PROCEDURE — 2580000003 HC RX 258: Performed by: NURSE ANESTHETIST, CERTIFIED REGISTERED

## 2022-04-19 RX ORDER — ONDANSETRON 2 MG/ML
INJECTION INTRAMUSCULAR; INTRAVENOUS PRN
Status: DISCONTINUED | OUTPATIENT
Start: 2022-04-19 | End: 2022-04-19 | Stop reason: SDUPTHER

## 2022-04-19 RX ORDER — SODIUM CHLORIDE 0.9 % (FLUSH) 0.9 %
5-40 SYRINGE (ML) INJECTION EVERY 12 HOURS SCHEDULED
Status: DISCONTINUED | OUTPATIENT
Start: 2022-04-19 | End: 2022-04-19 | Stop reason: HOSPADM

## 2022-04-19 RX ORDER — HYDRALAZINE HYDROCHLORIDE 20 MG/ML
10 INJECTION INTRAMUSCULAR; INTRAVENOUS
Status: DISCONTINUED | OUTPATIENT
Start: 2022-04-19 | End: 2022-04-19 | Stop reason: HOSPADM

## 2022-04-19 RX ORDER — LIDOCAINE HYDROCHLORIDE 20 MG/ML
INJECTION, SOLUTION INTRAVENOUS PRN
Status: DISCONTINUED | OUTPATIENT
Start: 2022-04-19 | End: 2022-04-19 | Stop reason: SDUPTHER

## 2022-04-19 RX ORDER — FENTANYL CITRATE 50 UG/ML
INJECTION, SOLUTION INTRAMUSCULAR; INTRAVENOUS PRN
Status: DISCONTINUED | OUTPATIENT
Start: 2022-04-19 | End: 2022-04-19 | Stop reason: SDUPTHER

## 2022-04-19 RX ORDER — MIDAZOLAM HYDROCHLORIDE 1 MG/ML
INJECTION INTRAMUSCULAR; INTRAVENOUS PRN
Status: DISCONTINUED | OUTPATIENT
Start: 2022-04-19 | End: 2022-04-19 | Stop reason: SDUPTHER

## 2022-04-19 RX ORDER — FENTANYL CITRATE 50 UG/ML
25 INJECTION, SOLUTION INTRAMUSCULAR; INTRAVENOUS EVERY 5 MIN PRN
Status: DISCONTINUED | OUTPATIENT
Start: 2022-04-19 | End: 2022-04-19 | Stop reason: HOSPADM

## 2022-04-19 RX ORDER — DEXAMETHASONE SODIUM PHOSPHATE 4 MG/ML
INJECTION, SOLUTION INTRA-ARTICULAR; INTRALESIONAL; INTRAMUSCULAR; INTRAVENOUS; SOFT TISSUE PRN
Status: DISCONTINUED | OUTPATIENT
Start: 2022-04-19 | End: 2022-04-19 | Stop reason: SDUPTHER

## 2022-04-19 RX ORDER — MAGNESIUM HYDROXIDE 1200 MG/15ML
LIQUID ORAL CONTINUOUS PRN
Status: COMPLETED | OUTPATIENT
Start: 2022-04-19 | End: 2022-04-19

## 2022-04-19 RX ORDER — PROPOFOL 10 MG/ML
INJECTION, EMULSION INTRAVENOUS PRN
Status: DISCONTINUED | OUTPATIENT
Start: 2022-04-19 | End: 2022-04-19 | Stop reason: SDUPTHER

## 2022-04-19 RX ORDER — PROPOFOL 10 MG/ML
INJECTION, EMULSION INTRAVENOUS CONTINUOUS PRN
Status: DISCONTINUED | OUTPATIENT
Start: 2022-04-19 | End: 2022-04-19 | Stop reason: SDUPTHER

## 2022-04-19 RX ORDER — CLINDAMYCIN PHOSPHATE 600 MG/50ML
600 INJECTION INTRAVENOUS ONCE
Status: COMPLETED | OUTPATIENT
Start: 2022-04-19 | End: 2022-04-19

## 2022-04-19 RX ORDER — ONDANSETRON 2 MG/ML
4 INJECTION INTRAMUSCULAR; INTRAVENOUS
Status: DISCONTINUED | OUTPATIENT
Start: 2022-04-19 | End: 2022-04-19 | Stop reason: HOSPADM

## 2022-04-19 RX ORDER — BUPIVACAINE HYDROCHLORIDE 5 MG/ML
INJECTION, SOLUTION EPIDURAL; INTRACAUDAL PRN
Status: DISCONTINUED | OUTPATIENT
Start: 2022-04-19 | End: 2022-04-19 | Stop reason: ALTCHOICE

## 2022-04-19 RX ORDER — SODIUM CHLORIDE 9 MG/ML
INJECTION, SOLUTION INTRAVENOUS PRN
Status: DISCONTINUED | OUTPATIENT
Start: 2022-04-19 | End: 2022-04-19 | Stop reason: HOSPADM

## 2022-04-19 RX ORDER — OXYCODONE HYDROCHLORIDE 5 MG/1
5 TABLET ORAL EVERY 6 HOURS PRN
Qty: 12 TABLET | Refills: 0 | Status: SHIPPED | OUTPATIENT
Start: 2022-04-19 | End: 2022-04-22

## 2022-04-19 RX ORDER — SODIUM CHLORIDE 0.9 % (FLUSH) 0.9 %
5-40 SYRINGE (ML) INJECTION PRN
Status: DISCONTINUED | OUTPATIENT
Start: 2022-04-19 | End: 2022-04-19 | Stop reason: HOSPADM

## 2022-04-19 RX ORDER — LABETALOL HYDROCHLORIDE 5 MG/ML
10 INJECTION, SOLUTION INTRAVENOUS
Status: DISCONTINUED | OUTPATIENT
Start: 2022-04-19 | End: 2022-04-19 | Stop reason: HOSPADM

## 2022-04-19 RX ORDER — SODIUM CHLORIDE, SODIUM LACTATE, POTASSIUM CHLORIDE, CALCIUM CHLORIDE 600; 310; 30; 20 MG/100ML; MG/100ML; MG/100ML; MG/100ML
INJECTION, SOLUTION INTRAVENOUS CONTINUOUS
Status: DISCONTINUED | OUTPATIENT
Start: 2022-04-19 | End: 2022-04-19 | Stop reason: HOSPADM

## 2022-04-19 RX ADMIN — DEXAMETHASONE SODIUM PHOSPHATE 4 MG: 4 INJECTION, SOLUTION INTRAMUSCULAR; INTRAVENOUS at 08:45

## 2022-04-19 RX ADMIN — CLINDAMYCIN PHOSPHATE 600 MG: 600 INJECTION, SOLUTION INTRAVENOUS at 08:51

## 2022-04-19 RX ADMIN — MIDAZOLAM HYDROCHLORIDE 2 MG: 2 INJECTION, SOLUTION INTRAMUSCULAR; INTRAVENOUS at 08:36

## 2022-04-19 RX ADMIN — FENTANYL CITRATE 25 MCG: 50 INJECTION, SOLUTION INTRAMUSCULAR; INTRAVENOUS at 08:49

## 2022-04-19 RX ADMIN — ONDANSETRON 4 MG: 2 INJECTION INTRAMUSCULAR; INTRAVENOUS at 09:12

## 2022-04-19 RX ADMIN — DEXMEDETOMIDINE HYDROCHLORIDE 4 MCG: 100 INJECTION, SOLUTION INTRAVENOUS at 08:44

## 2022-04-19 RX ADMIN — SODIUM CHLORIDE, POTASSIUM CHLORIDE, SODIUM LACTATE AND CALCIUM CHLORIDE: 600; 310; 30; 20 INJECTION, SOLUTION INTRAVENOUS at 08:06

## 2022-04-19 RX ADMIN — FENTANYL CITRATE 25 MCG: 50 INJECTION, SOLUTION INTRAMUSCULAR; INTRAVENOUS at 08:46

## 2022-04-19 RX ADMIN — PHENYLEPHRINE HYDROCHLORIDE 100 MCG: 10 INJECTION, SOLUTION INTRAMUSCULAR; INTRAVENOUS; SUBCUTANEOUS at 09:00

## 2022-04-19 RX ADMIN — PHENYLEPHRINE HYDROCHLORIDE 100 MCG: 10 INJECTION, SOLUTION INTRAMUSCULAR; INTRAVENOUS; SUBCUTANEOUS at 09:18

## 2022-04-19 RX ADMIN — PROPOFOL 70 MG: 10 INJECTION, EMULSION INTRAVENOUS at 08:43

## 2022-04-19 RX ADMIN — FENTANYL CITRATE 50 MCG: 50 INJECTION, SOLUTION INTRAMUSCULAR; INTRAVENOUS at 09:19

## 2022-04-19 RX ADMIN — LIDOCAINE HYDROCHLORIDE 60 MG: 20 INJECTION, SOLUTION INTRAVENOUS at 08:40

## 2022-04-19 RX ADMIN — DEXMEDETOMIDINE HYDROCHLORIDE 4 MCG: 100 INJECTION, SOLUTION INTRAVENOUS at 08:48

## 2022-04-19 RX ADMIN — PROPOFOL 180 MCG/KG/MIN: 10 INJECTION, EMULSION INTRAVENOUS at 08:46

## 2022-04-19 ASSESSMENT — PAIN SCALES - GENERAL
PAINLEVEL_OUTOF10: 2
PAINLEVEL_OUTOF10: 0

## 2022-04-19 ASSESSMENT — PULMONARY FUNCTION TESTS
PIF_VALUE: 1
PIF_VALUE: 0
PIF_VALUE: 0
PIF_VALUE: 1
PIF_VALUE: 0
PIF_VALUE: 1
PIF_VALUE: 1
PIF_VALUE: 0
PIF_VALUE: 1
PIF_VALUE: 0
PIF_VALUE: 0
PIF_VALUE: 1
PIF_VALUE: 1
PIF_VALUE: 0
PIF_VALUE: 0

## 2022-04-19 ASSESSMENT — PAIN DESCRIPTION - LOCATION: LOCATION: BACK

## 2022-04-19 ASSESSMENT — PAIN DESCRIPTION - PROGRESSION: CLINICAL_PROGRESSION: NOT CHANGED

## 2022-04-19 ASSESSMENT — PAIN DESCRIPTION - PAIN TYPE: TYPE: SURGICAL PAIN

## 2022-04-19 ASSESSMENT — PAIN DESCRIPTION - DESCRIPTORS: DESCRIPTORS: BURNING

## 2022-04-19 NOTE — PROGRESS NOTES
Patient arrived to PACU post EXCISION OF SOFT TISSUE MASS OF LEFT BACK with Dr. Laurel Muñoz. BP soft on arrival, otherwise, VSS. Dr. Kristofer Frye and CRNA gave PACU RN report at bedside, stating BP support given during procedure. Surgical site clean, dry and intact. Patient shows no evidence of pain at this time. Will continue to monitor.

## 2022-04-19 NOTE — PROGRESS NOTES
Ambulatory Surgery/Procedure Discharge Note    Vitals:    04/19/22 1101   BP: 110/74   Pulse: 61   Resp: 16   Temp: 97.1 °F (36.2 °C)   SpO2: 94%       In: 1680 [P.O.:240; I.V.:1440]  Out: 0     Restroom use offered before discharge. yes    Pain assessment:    Pain Level: 2  Pain level tolerable for discharge. Patient discharged to home/self care.  Patient discharged via wheel chair by transporter to waiting family/S.O.       4/19/2022 11:42 AM

## 2022-04-19 NOTE — H&P
Devin Miller    [id number]    [facility] Same Day Surgery Update H & P  Department of General Surgery   Surgical Service   Pre-operative History and Physical  Last H & P within the last 30 days. DIAGNOSIS:   Soft tissue mass [M79.89]    Procedure(s):  EXCISION OF SOFT TISSUE MASS OF THE BACK    History obtained from: Patient interview and EHR      HISTORY OF PRESENT ILLNESS:   Patient is a 64 y.o. female with a soft tissue mass of the left upper back, consistent with a lipoma. She underwent a previous soft tissue excision in the same anatomical location. Shortly thereafter, she observed a recurrence of the mass, with progressive increase in size, and presents today for excision. Illness screening:  Patient denies recent fever, chills, worsening cough, or known sick exposure     Past Medical History:        Diagnosis Date    Allergic rhinitis     Chicken pox     Chondromalacia patellae, left knee     Dyspareunia     History of Prinzmetal angina     HPV (human papilloma virus) anogenital infection     Hyperlipidemia     Recurrent cold sores     STD (sexually transmitted disease)     HPV     Past Surgical History:        Procedure Laterality Date    COLONOSCOPY  8/28/2020    COLONOSCOPY WITH BIOPSY performed by Stanislaw Sutherland MD at [place] [address] ARTHROSCOPY Left 07/26/2018    LEEP      SKIN TAG REMOVAL      TOOTH EXTRACTION      UPPER GASTROINTESTINAL ENDOSCOPY N/A 8/28/2020    EGD BIOPSY performed by Stanislaw Sutherland MD at [facility] N/A 8/28/2020    EGD [place] [facility] performed by Stanislaw Sutherland MD at [facility] N/A 12/4/2020    ESOPHAGOGASTRODUODENOSCOPY performed by Alyson Jimenez MD at [facility] ENDOSCOPY       Medications Prior to Admission:      Prior to Admission medications    Medication Sig Start Date End Date Taking?  Authorizing Provider   rosuvastatin (CRESTOR) 10 MG tablet TAKE 1 TABLET BY MOUTH ONE TIME A DAY 1/11/22   Chente Marks MD   omeprazole (PRILOSEC) 20 MG delayed release capsule Take 20 mg by mouth Daily  11/6/21   Historical Provider, MD   valACYclovir (VALTREX) 1 g tablet TAKE TWO TABLETS BY MOUTH TWICE A DAY FOR 2 DOSES AS NEEDED 11/8/21   Chente Marks MD   Turmeric (QC TUMERIC COMPLEX PO) Take 1 capsule by mouth daily     Historical Provider, MD   vitamin B-12 (CYANOCOBALAMIN) 100 MCG tablet Take 50 mcg by mouth daily    Historical Provider, MD   Vitamin D (CHOLECALCIFEROL) 1000 UNITS CAPS capsule Take 1,000 Units by mouth daily. Historical Provider, MD         Allergies:  Penicillins and Sulfa antibiotics    PHYSICAL EXAM:      /79   Pulse 62   Temp 97.9 °F (36.6 °C) (Oral)   Resp 18   Ht 5' 9\" (1.753 m)   Wt 177 lb 6.4 oz (80.5 kg)   LMP 07/28/2012   SpO2 100%   BMI 26.20 kg/m²      Airway:  Airway patent with no audible stridor. Heart:  Regular rate and rhythm. No murmur noted. Lungs:  No increased work of breathing, good air exchange, clear to auscultation bilaterally, no crackles or wheezing. Abdomen:  Soft, non-distended, non-tender, no rebound tenderness or guarding, and no masses palpated. ASSESSMENT AND PLAN     Patient is a 64 y.o. female with above specified procedure planned. 1.  The patients history and physical was obtained and signed off by the pre-admission testing department. Patient seen and focused exam done today- no new changes since last physical exam on 4/13/2022.    2.  Access to ancillary services are available per request of the provider.     Jacklyn Ricardo, WILIAN - CNP     4/19/2022

## 2022-04-19 NOTE — ANESTHESIA POSTPROCEDURE EVALUATION
Department of Anesthesiology  Postprocedure Note    Patient: Mo Rutherford  MRN: [id number]  YOB: 1960  Date of evaluation: 4/19/2022  Time:  9:34 AM     Procedure Summary     Date: 04/19/22 Room / Location: [address] [place] [address]    Anesthesia Start: 9004 Anesthesia Stop: 0933    Procedure: EXCISION OF SOFT TISSUE MASS OF LEFT BACK (N/A ) Diagnosis:       Soft tissue mass      (Soft tissue mass [M79.89])    Surgeons: Carlin Welsh MD Responsible Provider: Yajaira Allen DO    Anesthesia Type: MAC ASA Status: 2          Anesthesia Type: MAC    Pepper Phase I: Pepper Score: 10    Pepper Phase II:      Last vitals: Reviewed and per EMR flowsheets.        Anesthesia Post Evaluation    Patient location during evaluation: PACU  Patient participation: complete - patient participated  Level of consciousness: awake  Pain score: 1  Airway patency: patent  Nausea & Vomiting: no nausea and no vomiting  Complications: no  Cardiovascular status: blood pressure returned to baseline and hemodynamically stable  Respiratory status: acceptable  Hydration status: euvolemic  Multimodal analgesia pain management approach

## 2022-04-19 NOTE — PROGRESS NOTES
PACU Transfer to [facility]    Vitals:    04/19/22 1043   BP: 107/75   Pulse: 60   Resp: 12   Temp: 97.2 °F (36.2 °C)   SpO2: 94%         Intake/Output Summary (Last 24 hours) at 4/19/2022 1053  Last data filed at 4/19/2022 1042  Gross per 24 hour   Intake 1680 ml   Output 0 ml   Net 1680 ml       Pain assessment:  none  Pain Level: 0    Patient has all belongings at discharge. PACU RN called and updated patient's .   Patient transferred to care of KATHI RN.    4/19/2022 10:53 AM

## 2022-04-19 NOTE — BRIEF OP NOTE
Brief Postoperative Note      Patient: Ada Philip  YOB: 1960  MRN: [id number]    Date of Procedure: 4/19/2022    Pre-Op Diagnosis: Soft tissue mass [M79.89]    Post-Op Diagnosis: Same       Procedure(s):  EXCISION OF SOFT TISSUE MASS OF LEFT BACK    Surgeon(s):  Keven Lemus MD    Assistant:  Resident: Lisandro Teixeira DO    Anesthesia: Monitor Anesthesia Care    Estimated Blood Loss (mL): Minimal    Complications: None    Specimens:   ID Type Source Tests Collected by Time Destination   A : SOFT TISSUE MASS OF LEFT BACK Tissue Tissue SURGICAL PATHOLOGY Keven Lemus MD 4/19/2022 4849        Implants:  * No implants in log *      Drains: * No LDAs found *    Findings:   Lipoma removd from left upper back. 7cm x6cm.     Electronically signed by Lisandro Teixeira DO on 4/19/2022 at 9:24 AM

## 2022-04-19 NOTE — PROGRESS NOTES
Pt to SDS for excision of soft tissues mass of the back; Pt is alert; oriented X 4; speech clear; breathing easily on RA; denies any pain; walks with steady gait without assist; has mass on left back which pt states \"is a lipoma. \"  #18 IV placed in left wrist by Dr. Uriel Gomez. Clindamycin 600 mg to OR with pt. Pt now in surgery. Pt stated she had said her goodbye's to  already as pt's surgery moved up. This RN updated pt's  in waiting room and he has pt's surgical number.

## 2022-04-19 NOTE — ANESTHESIA PRE PROCEDURE
Department of Anesthesiology  Preprocedure Note       Name:  Lola Anaya   Age:  64 y.o.  :  1960                                          MRN:  [id number]         Date:  2022      Surgeon: Noe Velasquez):  Dariel Munoz MD    Procedure: Procedure(s):  EXCISION OF SOFT TISSUE MASS OF THE BACK    Medications prior to admission:   Prior to Admission medications    Medication Sig Start Date End Date Taking? Authorizing Provider   rosuvastatin (CRESTOR) 10 MG tablet TAKE 1 TABLET BY MOUTH ONE TIME A DAY 22   Destiny Lanier MD   omeprazole (PRILOSEC) 20 MG delayed release capsule Take 20 mg by mouth Daily  21   Historical Provider, MD   valACYclovir (VALTREX) 1 g tablet TAKE TWO TABLETS BY MOUTH TWICE A DAY FOR 2 DOSES AS NEEDED 21   Destiny Lanier MD   Turmeric (QC TUMERIC COMPLEX PO) Take 1 capsule by mouth daily     Historical Provider, MD   vitamin B-12 (CYANOCOBALAMIN) 100 MCG tablet Take 50 mcg by mouth daily    Historical Provider, MD   Vitamin D (CHOLECALCIFEROL) 1000 UNITS CAPS capsule Take 1,000 Units by mouth daily. Historical Provider, MD       Current medications:    Current Facility-Administered Medications   Medication Dose Route Frequency Provider Last Rate Last Admin    clindamycin (CLEOCIN) 600 mg in dextrose 5 % 50 mL IVPB  600 mg IntraVENous Once Dariel Munoz MD        lactated ringers infusion   IntraVENous Continuous Linwood Chu DO           Allergies:     Allergies   Allergen Reactions    Penicillins Hives     Bad rash all over    Sulfa Antibiotics Hives       Problem List:    Patient Active Problem List   Diagnosis Code    Vitamin D deficiency E55.9    Recurrent cold sores B00.1    Atrophic vaginitis N95.2    Acne vulgaris L70.0    Baker cyst M71.20    Chondromalacia patellae, 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    Mixed hyperlipidemia E78.2    Chronic seasonal allergic rhinitis due to pollen J30.1       Past Medical History:        Diagnosis Date    Allergic rhinitis     Chicken pox     Chondromalacia patellae, left knee     Dyspareunia     HPV (human papilloma virus) anogenital infection     Hyperlipidemia     Recurrent cold sores     STD (sexually transmitted disease)     HPV       Past Surgical History:        Procedure Laterality Date    COLONOSCOPY  8/28/2020    COLONOSCOPY WITH BIOPSY performed by Harman [facility]argas MD at [facility] ARTHROSCOPY Left 07/26/2018    LEEP      SKIN TAG REMOVAL      TOOTH EXTRACTION      UPPER GASTROINTESTINAL ENDOSCOPY N/A 8/28/2020    EGD BIOPSY performed by Harman Vargas MD at [address] [facility] N/A 8/28/2020    EGD Teresita Males performed by Harman Vargas MD at [address] [facility] N/A 12/4/2020    ESOPHAGOGASTRODUODENOSCOPY performed by Francisco Nicholson MD at [address] ENDOSCOPY       Social History:    Social History     Tobacco Use    Smoking status: Never Smoker    Smokeless tobacco: Never Used   Substance Use Topics    Alcohol use: Yes     Alcohol/week: 8.0 standard drinks     Types: 4 Cans of beer, 4 Standard drinks or equivalent per week     Comment: occasionally                                Counseling given: Not Answered      Vital Signs (Current):   Vitals:    04/11/22 1242 04/19/22 0745   BP:  121/79   Pulse:  62   Resp:  18   Temp:  97.9 °F (36.6 °C)   TempSrc:  Oral   SpO2:  100%   Weight: 175 lb (79.4 kg) 177 lb 6.4 oz (80.5 kg)   Height: 5' 9\" (1.753 m) 5' 9\" (1.753 m)                                              BP Readings from Last 3 Encounters:   04/19/22 121/79   04/13/22 110/76   02/25/22 111/77       NPO Status:                                                                                 BMI:   Wt Readings from Last 3 Encounters:   04/19/22 177 lb 6.4 oz (80.5 kg)   04/13/22 177 lb (80.3 kg)   02/25/22 199 lb 3.2 oz (90.4 kg)     Body mass index is 26.2 kg/m².     CBC:   Lab Results   Component Value Date    WBC 4.8 01/21/2021    RBC 4.74 01/21/2021    HGB 14.3 01/21/2021    HCT 43.3 01/21/2021    MCV 91.2 01/21/2021    RDW 12.7 01/21/2021     01/21/2021       CMP:   Lab Results   Component Value Date     04/13/2022    K 4.4 04/13/2022    K 4.2 01/21/2021     04/13/2022    CO2 25 04/13/2022    BUN 13 04/13/2022    CREATININE 0.8 04/13/2022    GFRAA >60 04/13/2022    GFRAA >60 08/02/2012    AGRATIO 1.6 04/13/2022    LABGLOM >60 04/13/2022    GLUCOSE 81 04/13/2022    PROT 7.0 04/13/2022    PROT 7.1 08/02/2012    CALCIUM 9.9 04/13/2022    BILITOT 0.6 04/13/2022    ALKPHOS 63 04/13/2022    AST 20 04/13/2022    ALT 21 04/13/2022       POC Tests: No results for input(s): POCGLU, POCNA, POCK, POCCL, POCBUN, POCHEMO, POCHCT in the last 72 hours. Coags: No results found for: PROTIME, INR, APTT    HCG (If Applicable): No results found for: PREGTESTUR, PREGSERUM, HCG, HCGQUANT     ABGs: No results found for: PHART, PO2ART, FKA2GIV, FYF5CWQ, BEART, Z5LEWEBP     Type & Screen (If Applicable):  No results found for: LABABO, LABRH    Drug/Infectious Status (If Applicable):  No results found for: HIV, HEPCAB    COVID-19 Screening (If Applicable):   Lab Results   Component Value Date    COVID19 Not Detected 11/30/2020    COVID19 NOT DETECTED 08/24/2020           Anesthesia Evaluation  Patient summary reviewed and Nursing notes reviewed  Airway: Mallampati: II  TM distance: >3 FB   Neck ROM: full  Mouth opening: > = 3 FB Dental: normal exam         Pulmonary:Negative Pulmonary ROS and normal exam  breath sounds clear to auscultation                             Cardiovascular:Negative CV ROS  Exercise tolerance: good (>4 METS),           Rhythm: regular  Rate: normal                    Neuro/Psych:   Negative Neuro/Psych ROS              GI/Hepatic/Renal:   (+) GERD: well controlled,           Endo/Other: Negative Endo/Other ROS                    Abdominal:       Abdomen: soft. Vascular: negative vascular ROS. Other Findings:             Anesthesia Plan      MAC     ASA 2       Induction: intravenous. MIPS: Postoperative opioids intended and Prophylactic antiemetics administered. Anesthetic plan and risks discussed with patient. Use of blood products discussed with patient whom consented to blood products. Plan discussed with CRNA.     Attending anesthesiologist reviewed and agrees with Preprocedure content              Bart Kellogg DO   4/19/2022

## 2022-04-20 NOTE — OP NOTE
Operative Note      Patient: Portillo Griffin  YOB: 1960  MRN: [id number]    Date of Procedure: 4/19/2022    Pre-Op Diagnosis: Soft tissue mass [M79.89]    Post-Op Diagnosis: Same       Procedure(s):  EXCISION OF SOFT TISSUE MASS OF LEFT BACK    Surgeon(s):  Gonzalo Bejarano MD    Assistant:   Resident: Kristin Larios DO    Anesthesia: Monitor Anesthesia Care    Estimated Blood Loss (mL): Minimal    Complications: None    Specimens:   ID Type Source Tests Collected by Time Destination   A : SOFT TISSUE MASS OF LEFT BACK Tissue Tissue SURGICAL PATHOLOGY Gonzalo Bejarano MD 4/19/2022 3045        Implants:  * No implants in log *      Drains: * No LDAs found *    Findings: 6cm x 7cm soft tissue mass of left posterior back    Detailed Description of Procedure:     INDICATIONS: The patient is a 70-year-old female who presented to clinic with enlarging soft tissue mass of left upper back after previous removal.     The risks, benefits, and alternatives of procedure were explained to the patient including risks of bleeding, infection. Patient understood all of these risks and agreed to proceed. Typical postoperative course was discussed. PROCEDURE IN DETAIL: The patient was brought to the operating theater. The patient was placed in right lateral decubitus position and sedation was started. Conscious sedation achieved. 15cc of local anasthetic used to anaesthatise the skin. A 15 blade scalpel was used to make a 3cm elliptical transverse incision around the previous incision. Bovie electrocautery and blunt dissection were used to completely free the edges of the mass. The mass was grasped and the base was amputated. Bovie electrocautery was used to achieve hemostasis. Interrupted 3-0 Vicryls were then used to approximate the dermal layer and Prineo dressing was applied to the incision. The patient tolerated the procedure well, was woken up and brought to the PACU in stable condition.  All counts were correct x2 at the end of the procedure. Dr. Burgess Bowden was present and scrubbed for the entire case.     Electronically signed by Beverly Mendes DO on 4/19/2022 at 8:58 PM         Electronically signed by Beverly Mendes DO on 4/19/2022 at 8:56 PM

## 2022-04-25 DIAGNOSIS — E78.2 MIXED HYPERLIPIDEMIA: ICD-10-CM

## 2022-04-25 RX ORDER — ROSUVASTATIN CALCIUM 10 MG/1
TABLET, COATED ORAL
Qty: 90 TABLET | Refills: 0 | Status: SHIPPED | OUTPATIENT
Start: 2022-04-25 | End: 2022-07-22

## 2022-04-25 NOTE — TELEPHONE ENCOUNTER
Medication:   Requested Prescriptions     Pending Prescriptions Disp Refills    rosuvastatin (CRESTOR) 10 MG tablet [Pharmacy Med Name: ROSUVASTATIN CALCIUM 10MG TABS] 90 tablet 0     Sig: TAKE 1 TABLET BY MOUTH ONE TIME A DAY        Last Filled: 1/11/2022   Last appt: 4/13/2022   Next appt: NONE

## 2022-04-28 ENCOUNTER — TELEPHONE (OUTPATIENT)
Dept: FAMILY MEDICINE CLINIC | Age: 62
End: 2022-04-28

## 2022-04-28 NOTE — TELEPHONE ENCOUNTER
Pt stopped by the office to drop off her Pre-Op form for eye surgery. Pt states it just a little bit over the 30 day period but was told by [facility] that if Dr. Brandt Lies feels it's ok to fill out, that would be fine. Please call upon completion. Form scanned into chart and original placed in Dr. Felicia Carter folder up front.

## 2022-04-29 ENCOUNTER — OFFICE VISIT (OUTPATIENT)
Dept: SURGERY | Age: 62
End: 2022-04-29

## 2022-04-29 VITALS
BODY MASS INDEX: 26.66 KG/M2 | HEART RATE: 56 BPM | SYSTOLIC BLOOD PRESSURE: 117 MMHG | DIASTOLIC BLOOD PRESSURE: 73 MMHG | WEIGHT: 180 LBS | HEIGHT: 69 IN

## 2022-04-29 DIAGNOSIS — Z09 POSTOP CHECK: Primary | ICD-10-CM

## 2022-04-29 PROCEDURE — 99024 POSTOP FOLLOW-UP VISIT: CPT | Performed by: SURGERY

## 2022-05-03 NOTE — PROGRESS NOTES
Department of General Surgery - Adult  General Surgery Service  Resident Office Note      CHIEF COMPLAINT: Postoperative visit    HISTORY OF PRESENT ILLNESS:  This is a 64 y.o. female who presents for 2-week follow-up from removal of soft tissue mass of the back. The patient states that he is doing well. The patient says she is tolerating normal diet without issue. Patient denies any nausea vomiting or changes in bowel movements. Pain at the incision site is improving and patient is able to do normal activity. REVIEW OF SYSTEMS:    A 10 point review of systems was conducted, significant findings as noted in HPI. All other systems negative. PHYSICAL EXAM:    Vitals:    04/29/22 0944   BP: 130/78   Pulse: 87       General appearance: alert, resting comfortably  Neuro: A&Ox3, no focal deficits  HENT: trachea midline, no JVD, no LAD  Eyes: PERRLA, no scleral icterus  Chest/Lungs: symmetrical chest rise, no increased work of breathing  Cardiovascular: RRR  Abdomen: soft, non-tender, non-distended, no guarding/rigidity  Skin: warm and dry, back incision with Prineo dressing over top, no erythema or swelling  Extremities: no edema, no cyanosis    ASSESSMENT AND PLAN:  Patient doing well postoperatively with no issues. The patient can follow-up as needed with general surgery clinic. Kevin Barraza DO, PGY-2  05/03/22  5:15 PM    I have seen, examined, and reviewed the patients chart. I agree with the residents assessment and have made appropriate changes.     Martir Tidwell

## 2022-05-18 ENCOUNTER — OFFICE VISIT (OUTPATIENT)
Dept: FAMILY MEDICINE CLINIC | Age: 62
End: 2022-05-18
Payer: COMMERCIAL

## 2022-05-18 VITALS
OXYGEN SATURATION: 98 % | DIASTOLIC BLOOD PRESSURE: 72 MMHG | BODY MASS INDEX: 26.66 KG/M2 | WEIGHT: 180 LBS | SYSTOLIC BLOOD PRESSURE: 112 MMHG | HEIGHT: 69 IN | HEART RATE: 58 BPM

## 2022-05-18 DIAGNOSIS — Z01.818 PREOP EXAMINATION: Primary | ICD-10-CM

## 2022-05-18 DIAGNOSIS — H26.9 CATARACT OF RIGHT EYE, UNSPECIFIED CATARACT TYPE: ICD-10-CM

## 2022-05-18 PROCEDURE — 99212 OFFICE O/P EST SF 10 MIN: CPT | Performed by: FAMILY MEDICINE

## 2022-05-18 RX ORDER — MOXIFLOXACIN 5 MG/ML
SOLUTION/ DROPS OPHTHALMIC
COMMUNITY
Start: 2022-04-26

## 2022-05-18 RX ORDER — BROMFENAC SODIUM 0.7 MG/ML
SOLUTION/ DROPS OPHTHALMIC
COMMUNITY
Start: 2022-04-26

## 2022-05-18 RX ORDER — PREDNISOLONE ACETATE 10 MG/ML
SUSPENSION/ DROPS OPHTHALMIC
COMMUNITY
Start: 2022-04-26

## 2022-05-18 ASSESSMENT — ENCOUNTER SYMPTOMS: RESPIRATORY NEGATIVE: 1

## 2022-05-18 NOTE — PROGRESS NOTES
Kurt Randolph (:  1960) is a 64 y.o. female,Established patient, here for evaluation of the following chief complaint(s):  Pre-op Exam (Patient is scheduled for OD surgery on 22 at [facility] with Dr. Julio Chester.)         ASSESSMENT/PLAN:  Rosa Hoffman was seen today for pre-op exam.    Diagnoses and all orders for this visit:    Preop examination  Medically patient is at acceptable risk to undergo planned surgery. Cataract of right eye, unspecified cataract type         No follow-ups on file. Subjective   SUBJECTIVE/OBJECTIVE:  HPI   Pt is a of 64 y.o. female comes in today with   Chief Complaint   Patient presents with    Pre-op Exam     Patient is scheduled for OD surgery on 22 at [facility] with Dr. Julio Chester. No personal or family history of adverse reaction to anesthesia or bleeding tendency. Has been feeling well.     Vitals:    22 0817   BP: 112/72   Site: Right Upper Arm   Position: Sitting   Cuff Size: Small Adult   Pulse: 58   SpO2: 98%   Weight: 180 lb (81.6 kg)   Height: 5' 9\" (1.753 m)     Allergies   Allergen Reactions    Penicillins Hives     Bad rash all over    Sulfa Antibiotics Hives       Current Outpatient Medications on File Prior to Visit   Medication Sig Dispense Refill    prednisoLONE acetate (PRED FORTE) 1 % ophthalmic suspension       moxifloxacin (VIGAMOX) 0.5 % ophthalmic solution INSTILL 1 DROP IN RIGHT EYE FOUR TIMES DAILY AFTER SURGERY FOR 1 WEEK THEN STOP      PROLENSA 0.07 % SOLN INSTILL 1 DROP IN RIGHT EYE EVERY DAY AFTER SURGERY      rosuvastatin (CRESTOR) 10 MG tablet TAKE 1 TABLET BY MOUTH ONE TIME A DAY 90 tablet 0    omeprazole (PRILOSEC) 20 MG delayed release capsule Take 20 mg by mouth Daily       valACYclovir (VALTREX) 1 g tablet TAKE TWO TABLETS BY MOUTH TWICE A DAY FOR 2 DOSES AS NEEDED 36 tablet 0    Turmeric (QC TUMERIC COMPLEX PO) Take 1 capsule by mouth daily       vitamin B-12 (CYANOCOBALAMIN) 100 MCG tablet Take 50 mcg by mouth daily      Vitamin D (CHOLECALCIFEROL) 1000 UNITS CAPS capsule Take 1,000 Units by mouth daily. No current facility-administered medications on file prior to visit. Past Medical History:   Diagnosis Date    Allergic rhinitis     Chicken pox     Chondromalacia patellae, left knee     Dyspareunia     History of Prinzmetal angina     HPV (human papilloma virus) anogenital infection     Hyperlipidemia     Recurrent cold sores     STD (sexually transmitted disease)     HPV       Past Surgical History:   Procedure Laterality Date    COLONOSCOPY  8/28/2020    COLONOSCOPY WITH BIOPSY performed by Zayda Dumont MD at [address] Right     macular hole    KNEE ARTHROSCOPY Left 07/26/2018    LEEP      SKIN BIOPSY N/A 4/19/2022    EXCISION OF SOFT TISSUE MASS OF LEFT BACK performed by Tom Pino MD at [address] [place] [address]      UPPER GASTROINTESTINAL ENDOSCOPY N/A 8/28/2020    EGD BIOPSY performed by Zayda Dumont MD at [address] [facility] N/A 8/28/2020    EGD Hampton Shane performed by Zayda Dumont MD at [address] [facility] N/A 12/4/2020    ESOPHAGOGASTRODUODENOSCOPY performed by Anna Leach MD at [address] [facility] History     Socioeconomic History    Marital status:      Spouse name: None    Number of children: None    Years of education: None    Highest education level: None   Occupational History    None   Tobacco Use    Smoking status: Never Smoker    Smokeless tobacco: Never Used   Vaping Use    Vaping Use: Never used   Substance and Sexual Activity    Alcohol use:  Yes     Alcohol/week: 8.0 standard drinks     Types: 4 Cans of beer, 4 Standard drinks or equivalent per week     Comment: varies by week    Drug use: No    Sexual activity: Yes     Partners: Male   Other Topics Concern    None Social History Narrative    None     Social Determinants of Health     Financial Resource Strain: Low Risk     Difficulty of Paying Living Expenses: Not hard at all   Food Insecurity: No Food Insecurity    Worried About Running Out of Food in the Last Year: Never true    Felecia of Food in the Last Year: Never true   Transportation Needs:     Lack of Transportation (Medical): Not on file    Lack of Transportation (Non-Medical):  Not on file   Physical Activity:     Days of Exercise per Week: Not on file    Minutes of Exercise per Session: Not on file   Stress:     Feeling of Stress : Not on file   Social Connections:     Frequency of Communication with Friends and Family: Not on file    Frequency of Social Gatherings with Friends and Family: Not on file    Attends Yazidi Services: Not on file    Active Member of [address] [place] [address] [employer] or Organizations: Not on file    Attends Club or Organization Meetings: Not on file    Marital Status: Not on file   Intimate Partner Violence:     Fear of Current or Ex-Partner: Not on file    Emotionally Abused: Not on file    Physically Abused: Not on file    Sexually Abused: Not on file   Housing Stability:     Unable to Pay for Housing in the Last Year: Not on file    Number of [place] in the Last Year: Not on file    Unstable Housing in the Last Year: Not on file       Social History     Substance and Sexual Activity   Drug Use No       Family History   Problem Relation Age of Onset    High Blood Pressure Mother     High Cholesterol Mother     Depression Mother     Arthritis Father     Heart Disease Father     High Blood Pressure Father     High Cholesterol Father     Kidney Disease Father     Cancer Father         testicle    Heart Disease Maternal Grandfather     Heart Disease Paternal Grandfather     Heart Disease Brother         MI at 48    High Cholesterol Brother     High Cholesterol Sister     Rheum Arthritis Neg Hx     Osteoarthritis Neg Hx  Asthma Neg Hx     Breast Cancer Neg Hx     Diabetes Neg Hx     Heart Failure Neg Hx     Hypertension Neg Hx     Migraines Neg Hx     Ovarian Cancer Neg Hx     Rashes/Skin Problems Neg Hx     Seizures Neg Hx     Stroke Neg Hx     Thyroid Disease Neg Hx      Review of Systems   Constitutional: Negative. Respiratory: Negative. Cardiovascular: Negative. Hematological: Does not bruise/bleed easily. Objective   Physical Exam  Constitutional:       General: She is not in acute distress. Appearance: Normal appearance. HENT:      Head: Normocephalic. Mouth/Throat:      Mouth: Mucous membranes are moist.      Pharynx: No oropharyngeal exudate. Cardiovascular:      Rate and Rhythm: Normal rate and regular rhythm. Heart sounds: No murmur heard. Pulmonary:      Effort: Pulmonary effort is normal.      Breath sounds: Normal breath sounds. Musculoskeletal:      Cervical back: Normal range of motion and neck supple. No rigidity. Lymphadenopathy:      Cervical: No cervical adenopathy. Skin:     General: Skin is warm and dry. Capillary Refill: Capillary refill takes less than 2 seconds. Neurological:      Mental Status: She is alert. Psychiatric:         Mood and Affect: Mood normal.              An electronic signature was used to authenticate this note.     --Alon Larsen MD

## 2022-05-26 ENCOUNTER — OFFICE VISIT (OUTPATIENT)
Dept: GYNECOLOGY | Age: 62
End: 2022-05-26
Payer: COMMERCIAL

## 2022-05-26 VITALS
RESPIRATION RATE: 17 BRPM | OXYGEN SATURATION: 98 % | HEART RATE: 70 BPM | HEIGHT: 69 IN | SYSTOLIC BLOOD PRESSURE: 118 MMHG | WEIGHT: 178.2 LBS | DIASTOLIC BLOOD PRESSURE: 74 MMHG | BODY MASS INDEX: 26.39 KG/M2

## 2022-05-26 DIAGNOSIS — Z01.419 WELL WOMAN EXAM WITH ROUTINE GYNECOLOGICAL EXAM: Primary | ICD-10-CM

## 2022-05-26 PROCEDURE — 99396 PREV VISIT EST AGE 40-64: CPT | Performed by: OBSTETRICS & GYNECOLOGY

## 2022-05-27 ENCOUNTER — PATIENT MESSAGE (OUTPATIENT)
Dept: FAMILY MEDICINE CLINIC | Age: 62
End: 2022-05-27

## 2022-05-27 RX ORDER — OMEPRAZOLE 20 MG/1
20 CAPSULE, DELAYED RELEASE ORAL DAILY
Qty: 90 CAPSULE | Refills: 3 | Status: SHIPPED | OUTPATIENT
Start: 2022-05-27

## 2022-05-27 NOTE — TELEPHONE ENCOUNTER
From: Eloise Wilde  To: Dr. Alvaro Herr: 5/27/2022 6:50 AM EDT  Subject: Refill for Omeprazole    Dr Sunshine Montero during my last visit we talked about having the prescription for Omeprazole transferred to you rather than the GI doctor since I have my physical and maintenance drugs prescribed by you. You said you could do this. I need to have this prescription for 20 mg refilled through [facility] for 90 pills. Can you send that today? Thank you.      Emmy Murphy

## 2022-05-29 ASSESSMENT — ENCOUNTER SYMPTOMS
RESPIRATORY NEGATIVE: 1
GASTROINTESTINAL NEGATIVE: 1
EYES NEGATIVE: 1

## 2022-05-30 NOTE — PROGRESS NOTES
Subjective:      Patient ID: Yang Mcghee is a 64 y.o. female. Patient is here for annual.     Gynecologic Exam        Review of Systems   Constitutional: Negative. HENT: Negative. Eyes: Negative. Respiratory: Negative. Cardiovascular: Negative. Gastrointestinal: Negative. Genitourinary: Negative. Musculoskeletal: Negative. Skin: Negative. Neurological: Negative. Psychiatric/Behavioral: Negative.       Date of Birth 1960  Past Medical History:   Diagnosis Date    Allergic rhinitis     Chicken pox     Chondromalacia patellae, left knee     Dyspareunia     History of Prinzmetal angina     HPV (human papilloma virus) anogenital infection     Hyperlipidemia     Recurrent cold sores     STD (sexually transmitted disease)     HPV     Past Surgical History:   Procedure Laterality Date    COLONOSCOPY  2020    COLONOSCOPY WITH BIOPSY performed by Lucita Corona MD at [address] [facility] Right     macular hole    KNEE ARTHROSCOPY Left 2018    LEEP      SKIN BIOPSY N/A 2022    EXCISION OF SOFT TISSUE MASS OF LEFT BACK performed by Macrina Vivas MD at [address] [facility]      UPPER GASTROINTESTINAL ENDOSCOPY N/A 2020    EGD BIOPSY performed by Lucita Corona MD at [address] [facility] N/A 2020    EGD Fercho Cumins performed by Lucita Corona MD at [address] [facility] N/A 2020    ESOPHAGOGASTRODUODENOSCOPY performed by Alysha Qiu MD at [address] ENDOSCOPY     OB History    Para Term  AB Living   3 3 3     3   SAB IAB Ectopic Molar Multiple Live Births             3      # Outcome Date GA Lbr Harish/2nd Weight Sex Delivery Anes PTL Lv   3 Term 26    F Vag-Spont   MICHAEL   2 Term 1985    M Vag-Spont   MICHAEL   1 Term 26    M Vag-Spont   MICHAEL     Social History     Socioeconomic History    Marital status:      Spouse name: Not on file    Number of children: Not on file    Years of education: Not on file    Highest education level: Not on file   Occupational History    Not on file   Tobacco Use    Smoking status: Never Smoker    Smokeless tobacco: Never Used   Vaping Use    Vaping Use: Never used   Substance and Sexual Activity    Alcohol use: Yes     Alcohol/week: 8.0 standard drinks     Types: 4 Cans of beer, 4 Standard drinks or equivalent per week     Comment: varies by week    Drug use: No    Sexual activity: Yes     Partners: Male   Other Topics Concern    Not on file   Social History Narrative    Not on file     Social Determinants of Health     Financial Resource Strain: Low Risk     Difficulty of Paying Living Expenses: Not hard at all   Food Insecurity: No Food Insecurity    Worried About Running Out of Food in the Last Year: Never true    Felecia of Food in the Last Year: Never true   Transportation Needs:     Lack of Transportation (Medical): Not on file    Lack of Transportation (Non-Medical):  Not on file   Physical Activity:     Days of Exercise per Week: Not on file    Minutes of Exercise per Session: Not on file   Stress:     Feeling of Stress : Not on file   Social Connections:     Frequency of Communication with Friends and Family: Not on file    Frequency of Social Gatherings with Friends and Family: Not on file    Attends [employer] Services: Not on file    Active Member of [address] [place] [address] or Organizations: Not on file    Attends Club or Organization Meetings: Not on file    Marital Status: Not on file   Intimate Partner Violence:     Fear of Current or Ex-Partner: Not on file    Emotionally Abused: Not on file    Physically Abused: Not on file    Sexually Abused: Not on file   Housing Stability:     Unable to Pay for Housing in the Last Year: Not on file    Number of [place] in the Last Year: Not on file    Unstable Housing in the Last Year: Not on file     Allergies   Allergen Reactions    Penicillins Hives     Bad rash all over    Sulfa Antibiotics Hives     Outpatient Medications Marked as Taking for the 5/26/22 encounter (Office Visit) with Juma Roberson MD   Medication Sig Dispense Refill    prednisoLONE acetate (PRED FORTE) 1 % ophthalmic suspension       moxifloxacin (VIGAMOX) 0.5 % ophthalmic solution INSTILL 1 DROP IN RIGHT EYE FOUR TIMES DAILY AFTER SURGERY FOR 1 WEEK THEN STOP      PROLENSA 0.07 % SOLN INSTILL 1 DROP IN RIGHT EYE EVERY DAY AFTER SURGERY      rosuvastatin (CRESTOR) 10 MG tablet TAKE 1 TABLET BY MOUTH ONE TIME A DAY 90 tablet 0    [DISCONTINUED] omeprazole (PRILOSEC) 20 MG delayed release capsule Take 20 mg by mouth Daily       valACYclovir (VALTREX) 1 g tablet TAKE TWO TABLETS BY MOUTH TWICE A DAY FOR 2 DOSES AS NEEDED 36 tablet 0    Turmeric (QC TUMERIC COMPLEX PO) Take 1 capsule by mouth daily       vitamin B-12 (CYANOCOBALAMIN) 100 MCG tablet Take 50 mcg by mouth daily      Vitamin D (CHOLECALCIFEROL) 1000 UNITS CAPS capsule Take 1,000 Units by mouth daily.        Family History   Problem Relation Age of Onset    High Blood Pressure Mother     High Cholesterol Mother     Depression Mother     Arthritis Father     Heart Disease Father     High Blood Pressure Father     High Cholesterol Father     Kidney Disease Father     Cancer Father         testicle    Heart Disease Maternal Grandfather     Heart Disease Paternal Grandfather     Heart Disease Brother         MI at 48    High Cholesterol Brother     High Cholesterol Sister     Rheum Arthritis Neg Hx     Osteoarthritis Neg Hx     Asthma Neg Hx     Breast Cancer Neg Hx     Diabetes Neg Hx     Heart Failure Neg Hx     Hypertension Neg Hx     Migraines Neg Hx     Ovarian Cancer Neg Hx     Rashes/Skin Problems Neg Hx     Seizures Neg Hx     Stroke Neg Hx     Thyroid Disease Neg Hx      /74 (Site: Right Upper Arm, Position: Sitting, Cuff Size: Medium Adult)   Pulse 70   Resp 17   Ht 5' 9\" (1.753 m)   Wt 178 lb 3.2 oz (80.8 kg)   LMP 07/28/2012   SpO2 98%   BMI 26.32 kg/m²       Objective:   Physical Exam  Constitutional:       General: She is not in acute distress. Appearance: Normal appearance. She is well-developed and normal weight. She is not diaphoretic. HENT:      Head: Normocephalic and atraumatic. Nose: Nose normal.      Mouth/Throat:      Mouth: Mucous membranes are moist.      Pharynx: Oropharynx is clear. Eyes:      Pupils: Pupils are equal, round, and reactive to light. Neck:      Thyroid: No thyromegaly. Cardiovascular:      Rate and Rhythm: Normal rate and regular rhythm. Heart sounds: Normal heart sounds. No murmur heard. No friction rub. No gallop. Pulmonary:      Effort: Pulmonary effort is normal. No respiratory distress. Breath sounds: Normal breath sounds. No wheezing or rales. Chest:   Breasts:      Right: Normal. No swelling, bleeding, inverted nipple, mass, nipple discharge, skin change or tenderness. Left: Normal. No swelling, bleeding, inverted nipple, mass, nipple discharge, skin change or tenderness. Abdominal:      General: Abdomen is flat. Bowel sounds are normal. There is no distension. Palpations: Abdomen is soft. There is no hepatomegaly or mass. Tenderness: There is no abdominal tenderness. There is no guarding or rebound. Hernia: No hernia is present. There is no hernia in the left inguinal area. Genitourinary:     General: Normal vulva. Exam position: Lithotomy position. Pubic Area: No rash. Labia:         Right: No rash, tenderness, lesion or injury. Left: No rash, tenderness, lesion or injury. Urethra: No prolapse, urethral pain, urethral swelling or urethral lesion. Vagina: Normal. No signs of injury and foreign body. No vaginal discharge, erythema, tenderness or bleeding.       Cervix: No cervical motion tenderness, discharge, friability, lesion, erythema, cervical bleeding or eversion. Uterus: Not deviated, not enlarged, not fixed, not tender and no uterine prolapse. Adnexa:         Right: No mass, tenderness or fullness. Left: No mass, tenderness or fullness. Rectum: Normal. Guaiac result negative. No mass, tenderness, anal fissure, external hemorrhoid or internal hemorrhoid. Normal anal tone. Comments: Normal urethral meatus, nl urethra, nl bladder. Musculoskeletal:         General: No tenderness. Normal range of motion. Cervical back: Normal range of motion and neck supple. No rigidity. Lymphadenopathy:      Cervical: No cervical adenopathy. Lower Body: No right inguinal adenopathy. No left inguinal adenopathy. Skin:     General: Skin is warm and dry. Findings: No erythema or rash. Neurological:      General: No focal deficit present. Mental Status: She is alert and oriented to person, place, and time. Deep Tendon Reflexes: Reflexes are normal and symmetric. Psychiatric:         Mood and Affect: Mood normal.         Behavior: Behavior normal.         Thought Content: Thought content normal.         Judgment: Judgment normal.         Assessment:      1. Annual  2. menopause      Plan:      1.  Pap, calcium, exercise, mammogram, hemocult negative  2. stable        Shabnam Smith MD

## 2022-06-01 DIAGNOSIS — N89.8 VAGINAL IRRITATION: Primary | ICD-10-CM

## 2022-06-01 RX ORDER — CLINDAMYCIN PHOSPHATE 20 MG/G
CREAM VAGINAL
Qty: 1 G | Refills: 0 | OUTPATIENT
Start: 2022-06-01 | End: 2022-06-08

## 2022-06-10 ENCOUNTER — HOSPITAL ENCOUNTER (OUTPATIENT)
Dept: WOMENS IMAGING | Age: 62
Discharge: HOME | End: 2022-06-10
Payer: COMMERCIAL

## 2022-06-10 DIAGNOSIS — Z01.419 WELL WOMAN EXAM WITH ROUTINE GYNECOLOGICAL EXAM: ICD-10-CM

## 2022-06-10 PROCEDURE — 77063 BREAST TOMOSYNTHESIS BI: CPT

## 2022-07-22 DIAGNOSIS — E78.2 MIXED HYPERLIPIDEMIA: ICD-10-CM

## 2022-07-22 RX ORDER — ROSUVASTATIN CALCIUM 10 MG/1
TABLET, COATED ORAL
Qty: 90 TABLET | Refills: 0 | Status: SHIPPED | OUTPATIENT
Start: 2022-07-22 | End: 2022-10-21

## 2022-07-22 NOTE — TELEPHONE ENCOUNTER
Medication:   Requested Prescriptions     Pending Prescriptions Disp Refills    rosuvastatin (CRESTOR) 10 MG tablet [Pharmacy Med Name: ROSUVASTATIN CALCIUM 10MG TABS] 90 tablet 0     Sig: TAKE 1 TABLET BY MOUTH ONE TIME A DAY        Last Filled:  4/25/22    Patient Phone Number: [phone] (home)     Last appt: 5/18/2022   Next appt: Visit date not found    Last OARRS: No flowsheet data found.

## 2022-08-22 DIAGNOSIS — B00.1 RECURRENT COLD SORES: ICD-10-CM

## 2022-08-22 NOTE — TELEPHONE ENCOUNTER
Medication:   Requested Prescriptions     Pending Prescriptions Disp Refills    valACYclovir (VALTREX) 1 g tablet [Pharmacy Med Name: VALACYCLOVIR HCL 1GM TABS] 36 tablet 0     Sig: TAKE TWO TABLETS BY MOUTH TWICE A DAY FOR 2 DOSES AS NEEDED        Last Filled: 11/8/2021   Last appt: 5/18/2022   Next appt: none

## 2022-08-23 RX ORDER — VALACYCLOVIR HYDROCHLORIDE 1 G/1
TABLET, FILM COATED ORAL
Qty: 36 TABLET | Refills: 0 | Status: SHIPPED | OUTPATIENT
Start: 2022-08-23

## 2022-10-20 ENCOUNTER — OFFICE VISIT (OUTPATIENT)
Dept: ORTHOPEDIC SURGERY | Age: 62
End: 2022-10-20
Payer: COMMERCIAL

## 2022-10-20 VITALS — BODY MASS INDEX: 26.51 KG/M2 | WEIGHT: 179 LBS | HEIGHT: 69 IN

## 2022-10-20 DIAGNOSIS — M25.561 ACUTE PAIN OF RIGHT KNEE: Primary | ICD-10-CM

## 2022-10-20 PROCEDURE — 99203 OFFICE O/P NEW LOW 30 MIN: CPT | Performed by: STUDENT IN AN ORGANIZED HEALTH CARE EDUCATION/TRAINING PROGRAM

## 2022-10-20 NOTE — PROGRESS NOTES
Date:  10/20/2022    Name:  Parviz Lopez  Address:  [address] [place] [address]    :  1960      Age:   58 y.o.    SSN:  [id number]      Medical Record Number:  [id number]    Reason for Visit:    Chief Complaint    Knee Pain (NP Rt knee/)      DOS:10/20/2022     HPI: Orville Dakins is a 58 y.o. female here today for new patient evaluation regarding her right knee. The patient reports about 3 weeks of worsening pain and swelling to the knee after doing a high intensity interval training class with a lot of lunges. It was primarily focused on these. The patient is very active. She reports that her knee feels similar to what she had on her left knee when she had a meniscus tear treated in the past.  She noted swelling in the side and back of her knee and then the swelling went into her calf. She denies much instability fortunately. She has been treating it on her own with ibuprofen and therapy and time without much improvement. Pain Assessment  Location of Pain: Knee  Location Modifiers: Right  Severity of Pain: 1  Quality of Pain: Sharp  Duration of Pain: Persistent  Frequency of Pain: Intermittent  Aggravating Factors: Exercise, Stairs  Limiting Behavior: Some  Result of Injury: No  Work-Related Injury: No  Are there other pain locations you wish to document?: No  ROS: All systems reviewed on patient intake form. Pertinent items are noted in HPI.         Past Medical History:   Diagnosis Date    Allergic rhinitis     Chicken pox     Chondromalacia patellae, left knee     Dyspareunia     History of Prinzmetal angina     HPV (human papilloma virus) anogenital infection     Hyperlipidemia     Recurrent cold sores     STD (sexually transmitted disease)     HPV        Past Surgical History:   Procedure Laterality Date    COLONOSCOPY  2020    COLONOSCOPY WITH BIOPSY performed by Jared Melendez MD at [address] [facility] Right     macular hole KNEE ARTHROSCOPY Left 07/26/2018    LEEP      SKIN BIOPSY N/A 4/19/2022    EXCISION OF SOFT TISSUE MASS OF LEFT BACK performed by Dina Gorman MD at [facility] [address] REMOVAL      TOOTH EXTRACTION      UPPER GASTROINTESTINAL ENDOSCOPY N/A 8/28/2020    EGD BIOPSY performed by Wu Lora MD at [address] [facility] [address] [facility] [address] 8/28/2020    EGD Fredna Brazil performed by Wu Lora MD at [address] [facility] [address] N/A 12/4/2020    ESOPHAGOGASTRODUODENOSCOPY performed by Surjit Durbin MD at [facility] ENDOSCOPY       Family History   Problem Relation Age of Onset    High Blood Pressure Mother     High Cholesterol Mother     Depression Mother     Arthritis Father     Heart Disease Father     High Blood Pressure Father     High Cholesterol Father     Kidney Disease Father     Cancer Father         testicle    Heart Disease Maternal Grandfather     Heart Disease Paternal Grandfather     Heart Disease Brother         MI at 48    High Cholesterol Brother     High Cholesterol Sister     Rheum Arthritis Neg Hx     Osteoarthritis Neg Hx     Asthma Neg Hx     Breast Cancer Neg Hx     Diabetes Neg Hx     Heart Failure Neg Hx     Hypertension Neg Hx     Migraines Neg Hx     Ovarian Cancer Neg Hx     Rashes/Skin Problems Neg Hx     Seizures Neg Hx     Stroke Neg Hx     Thyroid Disease Neg Hx        Social History     Socioeconomic History    Marital status:    Tobacco Use    Smoking status: Never    Smokeless tobacco: Never   Vaping Use    Vaping Use: Never used   Substance and Sexual Activity    Alcohol use:  Yes     Alcohol/week: 8.0 standard drinks     Types: 4 Cans of beer, 4 Standard drinks or equivalent per week     Comment: varies by week    Drug use: No    Sexual activity: Yes     Partners: Male     Social Determinants of Health     Financial Resource Strain: Low Risk     Difficulty of Paying Living Expenses: Not hard at all   Food Insecurity: No Food Insecurity    Worried About Running Out of Food in the Last Year: Never true    Ran Out of Food in the Last Year: Never true       Current Outpatient Medications   Medication Sig Dispense Refill    valACYclovir (VALTREX) 1 g tablet TAKE TWO TABLETS BY MOUTH TWICE A DAY FOR 2 DOSES AS NEEDED 36 tablet 0    rosuvastatin (CRESTOR) 10 MG tablet TAKE 1 TABLET BY MOUTH ONE TIME A DAY (Patient not taking: Reported on 10/20/2022) 90 tablet 0    omeprazole (PRILOSEC) 20 MG delayed release capsule Take 1 capsule by mouth Daily 90 capsule 3    prednisoLONE acetate (PRED FORTE) 1 % ophthalmic suspension       moxifloxacin (VIGAMOX) 0.5 % ophthalmic solution INSTILL 1 DROP IN RIGHT EYE FOUR TIMES DAILY AFTER SURGERY FOR 1 WEEK THEN STOP      PROLENSA 0.07 % SOLN INSTILL 1 DROP IN RIGHT EYE EVERY DAY AFTER SURGERY      Turmeric (QC TUMERIC COMPLEX PO) Take 1 capsule by mouth daily       vitamin B-12 (CYANOCOBALAMIN) 100 MCG tablet Take 50 mcg by mouth daily      Vitamin D (CHOLECALCIFEROL) 1000 UNITS CAPS capsule Take 1,000 Units by mouth daily. No current facility-administered medications for this visit. Allergies   Allergen Reactions    Penicillins Hives     Bad rash all over    Sulfa Antibiotics Hives       Vital signs:  Ht 5' 9\" (1.753 m)   Wt 179 lb (81.2 kg)   LMP 07/28/2012   BMI 26.43 kg/m²        Right knee exam    Gait: No use of assistive devices. No antalgic gait. Alignment: normal alignment. Inspection/skin: Skin is intact without erythema or ecchymosis. No gross deformity. Palpation: mild crepitus. Tender along the medial joint line. Range of Motion: There is full range of motion. Strength: Normal quadriceps development. Effusion: Mild effusion noted. Ligamentous stability: No cruciate or collateral ligament instability. Neurologic and vascular: Skin is warm and well-perfused. Sensation is intact to light-touch. Special tests: Negative Butch sign.   Mild discomfort with deep flexion grind. Left knee comparison exam    Gait: No use of assistive devices. No antalgic gait. Alignment: normal alignment. Inspection/skin: Skin is intact without erythema or ecchymosis. No gross deformity. Palpation: mild crepitus. no joint line tenderness present. Range of Motion: There is full range of motion. Strength: Normal quadriceps development. Effusion: No effusion or swelling present. Ligamentous stability: No cruciate or collateral ligament instability. Neurologic and vascular: Skin is warm and well-perfused. Sensation is intact to light-touch. Special tests: Negative Butch sign. Diagnostics:  Radiology:       XR Right Knee Findings:     Views:  4 views R knee  Weight bearing: Yes   Findings: 4 views of the R knee taken in the office today and interpreted by me demonstrate no acute osseous abnormalities. Appropriate alignment noted. Well-maintained joint spaces noted to the medial, lateral, and patellofemoral joints. Appropriate patellar height and tilt. No fractures, dislocations, or radio-opaque foreign bodies noted. Previous comparison films: None    Impression:  No acute osseous abnormalities Right knee        Assessment: 58 y.o. female with right knee pain that has failed nonoperative care, concern for medial meniscus tear, patellofemoral chondromalacia    Plan: Pertinent imaging was reviewed. The etiology, natural history, and treatment options for the disorder were discussed. The roles of activity medication, antiinflammatories, injections, bracing, physical therapy, and surgical interventions were all described to the patient and questions were answered. The patient reports findings that are concerning for potential medial meniscus tear.   She reports similar symptoms to a meniscus tear she had in her left knee in the past.  She had swelling in the back of her knee down into her calf which may be related to a Baker's cyst that formed. She is tender along the joint line. She is very active and is preventing her from being active. Given all these findings I will order an MRI to rule out a medial meniscus tear. In the meantime I also see some chondromalacia to the patellofemoral joint, so I will give her some home exercises to help her with this. She can incorporate into her normal exercise routine. Follow-up after the MRI to go over the results. Susan Gonsalez is in agreement with this plan. All questions were answered to patient's satisfaction and was encouraged to call with any further questions. The patient was advised that NSAID-type medications have several potential side effects that include: gastrointestinal irritation including hemorrhage, renal injury, as well as an increased risk for heart attack and stroke. The patient was asked to take the medication with food and to stop if there is any symptoms of GI upset, including heartburn, nausea, increased gas or diarrhea. I asked the patient to contact their medical provider for vomiting, abdominal pain or black/bloody stools. The patient should have renal function testing per his medical provider periodically if the medication is taken on a regular basis. The patient should be alert for any swelling in the lower extremities and should stop taking the medication immediately and contact their medical provider should this occur. In addition, the patient should stop taking the medication immediately and contact their medical provider should there be any shortness of breath, fatigue and be evaluated in an emergency facility for any chest pain. The patient expresses understanding of these issues and questions were answered.         Piedad Olivo, DO  Orthopedic Surgery and Sports Medicine  10/20/2022    Orders Placed This Encounter   Procedures    XR KNEE RIGHT (MIN 4 VIEWS)     Standing Status:   Future     Number of Occurrences:   1     Standing Expiration Date:   10/20/2023 MRI KNEE RIGHT WO CONTRAST     Standing Status:   Future     Standing Expiration Date:   10/20/2023     Scheduling Instructions:      [employer]       MRI Knee right WO contrast      RO medial meniscal tear     Order Specific Question:   Reason for exam:     Answer:   RO medial meniscal tear

## 2022-10-21 ENCOUNTER — TELEPHONE (OUTPATIENT)
Dept: ORTHOPEDIC SURGERY | Age: 62
End: 2022-10-21

## 2022-10-21 DIAGNOSIS — E78.2 MIXED HYPERLIPIDEMIA: ICD-10-CM

## 2022-10-21 RX ORDER — ROSUVASTATIN CALCIUM 10 MG/1
TABLET, COATED ORAL
Qty: 90 TABLET | Refills: 0 | Status: SHIPPED | OUTPATIENT
Start: 2022-10-21

## 2022-10-21 NOTE — TELEPHONE ENCOUNTER
Patient called to inform that MRI was NPR and can be scheduled. In voicemail patient was told that paperwork was faxed to [employer] and they would be calling her to schedule appt. [employer] number was also given in the event they do not call soon.

## 2022-10-21 NOTE — TELEPHONE ENCOUNTER
Medication:   Requested Prescriptions     Pending Prescriptions Disp Refills    rosuvastatin (CRESTOR) 10 MG tablet [Pharmacy Med Name: ROSUVASTATIN CALCIUM 10MG TABS] 90 tablet 0     Sig: TAKE 1 TABLET BY MOUTH ONE TIME A DAY        Last Filled:  07/22/2022    Patient Phone Number: [phone] (home)     Last appt: 5/18/2022   Next appt: Visit date not found    Last OARRS: No flowsheet data found.

## 2022-10-25 ENCOUNTER — NURSE TRIAGE (OUTPATIENT)
Dept: OTHER | Facility: CLINIC | Age: 62
End: 2022-10-25

## 2022-10-25 ENCOUNTER — TELEPHONE (OUTPATIENT)
Dept: FAMILY MEDICINE CLINIC | Age: 62
End: 2022-10-25

## 2022-10-25 ENCOUNTER — HOSPITAL ENCOUNTER (OUTPATIENT)
Dept: VASCULAR LAB | Age: 62
Discharge: HOME | End: 2022-10-25
Payer: COMMERCIAL

## 2022-10-25 DIAGNOSIS — M79.89 LEG SWELLING: ICD-10-CM

## 2022-10-25 DIAGNOSIS — M79.89 RIGHT LEG SWELLING: ICD-10-CM

## 2022-10-25 DIAGNOSIS — M79.89 RIGHT LEG SWELLING: Primary | ICD-10-CM

## 2022-10-25 PROCEDURE — 93971 EXTREMITY STUDY: CPT

## 2022-10-25 NOTE — TELEPHONE ENCOUNTER
Received call from Aneesh Brown at [employer] with [employer] Complaint. Subjective: Caller states \"I have right leg swelling below the knee. \"     Current Symptoms: 3-4 weeks ago injured knee. Patient believes from this injury has a \"throbbing\" in right calf. Swelling from calf to ankle. Onset: 3 weeks ago; worsening    Associated Symptoms: denies redness/warmth  SOB with movement. Temperature: Denies    What has been tried: elevation, rest, aspirin    Recommended disposition: Go to ED/UCC Now (Or to Office with PCP Approval)    Care advice provided, patient verbalizes understanding; denies any other questions or concerns; instructed to call back for any new or worsening symptoms. Patient warm transferred to [place] for second level triage. Attention Provider: Thank you for allowing me to participate in the care of your patient. The patient was connected to triage in response to information provided to the ECC/PSC. Please do not respond through this encounter as the response is not directed to a shared pool.     Reason for Disposition   Thigh, calf, or ankle swelling in only one leg    Protocols used: Leg Swelling and Edema-ADULT-[place]

## 2022-10-25 NOTE — TELEPHONE ENCOUNTER
Please contact pt regarding swelling in her leg along with pain. Call transferred from NT to be seen today. No appts available at our office. NT concerned that pt may have a blood clot in her leg. Advised pt that she may want to be seen at the ER or Urgent Care. No appts available until Thursday and pt is scheduled for an MRI tomorrow.

## 2022-11-03 ENCOUNTER — OFFICE VISIT (OUTPATIENT)
Dept: ORTHOPEDIC SURGERY | Age: 62
End: 2022-11-03
Payer: COMMERCIAL

## 2022-11-03 VITALS — WEIGHT: 179 LBS | HEIGHT: 69 IN | BODY MASS INDEX: 26.51 KG/M2

## 2022-11-03 DIAGNOSIS — M71.21 SYNOVIAL CYST OF RIGHT POPLITEAL SPACE: Primary | ICD-10-CM

## 2022-11-03 PROCEDURE — 99213 OFFICE O/P EST LOW 20 MIN: CPT | Performed by: STUDENT IN AN ORGANIZED HEALTH CARE EDUCATION/TRAINING PROGRAM

## 2022-11-03 NOTE — PROGRESS NOTES
Chief Complaint  Follow-up (MRI TR)      History of Present Illness:  Pily Dean is a pleasant 58 y.o. female here today for repeat evaluation for her right knee and to go over MRI results. She reports her knee is feeling better. She still feels a little bit of swelling in the back of the leg. She underwent a Doppler which was negative. Prior HPI 10/20/22: Munira Marin is a 58 y.o. female here today for new patient evaluation regarding her right knee. The patient reports about 3 weeks of worsening pain and swelling to the knee after doing a high intensity interval training class with a lot of lunges. It was primarily focused on these. The patient is very active. She reports that her knee feels similar to what she had on her left knee when she had a meniscus tear treated in the past.  She noted swelling in the side and back of her knee and then the swelling went into her calf. She denies much instability fortunately. She has been treating it on her own with ibuprofen and therapy and time without much improvement. Pain Assessment  Location of Pain: Knee  Location Modifiers: Right  Severity of Pain: 3  Quality of Pain: Dull, Aching  Duration of Pain: Other (Comment)  Frequency of Pain: Constant  Limiting Behavior: Yes  Result of Injury: Yes  Work-Related Injury: No  Are there other pain locations you wish to document?: No    Medical History:  Patient's medications, allergies, past medical, surgical, social and family histories were reviewed and updated as appropriate. Pertinent items are noted in HPI  Review of systems reviewed from Patient History Form dated on .todat and available in the patient's chart under the Media tab. Vital Signs: There were no vitals filed for this visit. Constitutional: In no apparent distress. Normal affect. Alert and oriented X3 and is cooperative. Rate knee exam    Gait: No use of assistive devices. No antalgic gait.     Alignment: normal alignment. Inspection/skin: Skin is intact without erythema or ecchymosis. No gross deformity. Palpation: no crepitus. no joint line tenderness present. Palpable minimally tender Baker's cyst posterior medial knee. Range of Motion: There is full range of motion. Strength: Normal quadriceps development. Effusion: No effusion or swelling present. Ligamentous stability: No cruciate or collateral ligament instability. Neurologic and vascular: Skin is warm and well-perfused. Sensation is intact to light-touch. Special tests: Negative Butch sign. Radiology:     No new x-rays today. MRI results from [facility] of the right knee demonstrate a large Baker's cyst, there was reading of an MCL sprain and tendinopathy of the distal quadriceps but I believe this might be a bit of an over read         Assessment : 60-year-old female with right knee Baker's cyst    Impression:  Encounter Diagnosis   Name Primary? Synovial cyst of right popliteal space Yes       Office Procedures:  No orders of the defined types were placed in this encounter. Plan:     I do not see any meniscal tearing or other problems on her MRI that could be causing the Baker's cyst.  She may have just had some increased fluid in her knee from her training session. The cyst should continue to go away on its own. If it continues to linger or get worse then she can come back and see me we can discuss steroid injections versus possible diagnostic arthroscopy to better evaluate the meniscus if there is a small meniscal tear. But since she is minimally symptomatic today she can follow-up with me as needed    . Deondre Torres is in agreement with this plan. All questions were answered to patient's satisfaction and was encouraged to call with any further questions.     Lorie Shirley DO  Orthopedic Surgery and Sports Medicine  11/3/2022      This dictation was performed with a verbal recognition program [facility] ) and it was checked for errors. It is possible that there are still dictated errors within this office note. If so, please bring any errors to my attention for an addendum. All efforts were made to ensure that this office note is accurate.

## 2022-11-04 ENCOUNTER — TELEPHONE (OUTPATIENT)
Dept: FAMILY MEDICINE CLINIC | Age: 62
End: 2022-11-04

## 2022-11-04 DIAGNOSIS — L29.0 ANAL PRURITUS: Primary | ICD-10-CM

## 2022-11-04 RX ORDER — ALBENDAZOLE 200 MG/1
TABLET, FILM COATED ORAL
Qty: 4 TABLET | Refills: 0 | Status: SHIPPED | OUTPATIENT
Start: 2022-11-04

## 2022-11-04 NOTE — TELEPHONE ENCOUNTER
Patient is calling in stating that last night she noticed after using the bathroom that there was sometime of worm in her stool. She said it was about an inch long and was brownish in color. She states she has had some itching around her anus that she has noticed. She is not sure on where she may have contracted this at. I asked if she had done any traveling recently. She stated her and her  went to [place] in April and than she was in the [facility] about 2 years ago. She also states that her  is a kristy so she has been eating deer recently as well. She would like to know if something can be prescribed to get rid of the worms and something to help with the itching around her anus.  Please send to [address]

## 2022-11-04 NOTE — TELEPHONE ENCOUNTER
Sounds like pinworms. I sent in albendazole (1 po once and repeat in 2 weeks). If persists then should do ova and parasite.  I put in that order as well in case

## 2022-11-08 ENCOUNTER — TELEPHONE (OUTPATIENT)
Dept: ADMINISTRATIVE | Age: 62
End: 2022-11-08

## 2022-11-08 ENCOUNTER — TELEPHONE (OUTPATIENT)
Dept: FAMILY MEDICINE CLINIC | Age: 62
End: 2022-11-08

## 2022-11-08 NOTE — TELEPHONE ENCOUNTER
Medication  albendazole (ALBENZA) 200 MG tablet [8979]  Dose, Route, Frequency: As Directed   Dispense Quantity: 4 tablet Refills: 0          Si po once; repeat once in 2 weeks         Start Date: 22 End Date: --   Written Date: 22 Expiration Date: 23   Providers  Authorizing Provider: Maureen Joe MD NPI: [id number]   Ordering User:  Justin Acevesmoleland #[id number] Lawson Higgins [address] [phone] - F [phone]   [address] [place] [address], [place] [address]   Phone:  [phone]  Fax:  [phone]   B80.0 Pinworms

## 2022-11-08 NOTE — TELEPHONE ENCOUNTER
Pt called to check the status of her PA for the Albendazole Rx. I checked with MA who stated it was in process. Please call and advise pt.

## 2022-11-08 NOTE — TELEPHONE ENCOUNTER
Left message for patient to call back. I called and spoke with the pharmacy, they were billing under the wrong health insurance - corrected this error and they are preparing the Rx for patient.

## 2022-11-08 NOTE — TELEPHONE ENCOUNTER
Submitted PA for Albendazole 200MG tablets. Key: [id number]. Via CMM STATUS: Member should be able to get the drug/product without a PA at this time. If this requires a response please respond to the pool ( [facility] [address]). Thank you please advise patient.

## 2022-11-09 NOTE — TELEPHONE ENCOUNTER
Pt rc to let Dr. Annmarie Freeman know that she was able to  half of her Albendazole Rx. Pharmacy told her the other 1/2 will be available tomorrow.

## 2022-11-09 NOTE — TELEPHONE ENCOUNTER
Left message for patient to call back. I called and spoke with the pharmacy, they were billing under the wrong health insurance - corrected this error. Wanted to confirm she was able to  this script?

## 2023-01-26 DIAGNOSIS — E78.2 MIXED HYPERLIPIDEMIA: ICD-10-CM

## 2023-01-26 RX ORDER — ROSUVASTATIN CALCIUM 10 MG/1
TABLET, COATED ORAL
Qty: 90 TABLET | Refills: 0 | Status: SHIPPED | OUTPATIENT
Start: 2023-01-26

## 2023-01-26 NOTE — TELEPHONE ENCOUNTER
Medication:   Requested Prescriptions     Pending Prescriptions Disp Refills    rosuvastatin (CRESTOR) 10 MG tablet [Pharmacy Med Name: ROSUVASTATIN CALCIUM 10MG TABS] 90 tablet 0     Sig: TAKE ONE TABLET BY MOUTH ONE TIME A DAY        Last Filled: 10/21/2022   Last appt: 5/18/2022   Next appt: none

## 2023-04-25 DIAGNOSIS — B00.1 RECURRENT COLD SORES: ICD-10-CM

## 2023-04-25 RX ORDER — VALACYCLOVIR HYDROCHLORIDE 1 G/1
TABLET, FILM COATED ORAL
Qty: 36 TABLET | Refills: 0 | Status: SHIPPED | OUTPATIENT
Start: 2023-04-25

## 2023-04-25 NOTE — TELEPHONE ENCOUNTER
Medication:   Requested Prescriptions     Pending Prescriptions Disp Refills    valACYclovir (VALTREX) 1 g tablet [Pharmacy Med Name: VALACYCLOVIR HCL 1GM TABS] 36 tablet 0     Sig: TAKE TWO TABLETS BY MOUTH TWICE A DAY FOR 2 DOSES AS NEEDED        Last Filled:  8/23/22    Patient Phone Number: [phone] (home)     Last appt: 5/18/2022   Next appt: Visit date not found    Last OARRS: No flowsheet data found.

## 2023-05-04 DIAGNOSIS — E78.2 MIXED HYPERLIPIDEMIA: ICD-10-CM

## 2023-05-04 RX ORDER — ROSUVASTATIN CALCIUM 10 MG/1
TABLET, COATED ORAL
Qty: 90 TABLET | Refills: 0 | Status: SHIPPED | OUTPATIENT
Start: 2023-05-04

## 2023-05-04 NOTE — TELEPHONE ENCOUNTER
Medication:   Requested Prescriptions     Pending Prescriptions Disp Refills    rosuvastatin (CRESTOR) 10 MG tablet [Pharmacy Med Name: ROSUVASTATIN CALCIUM 10MG TABS] 90 tablet 0     Sig: TAKE ONE TABLET BY MOUTH ONE TIME A DAY        Last Filled:  1/26/23    Patient Phone Number: [phone] (home)     Last appt: 5/18/2022   Next appt: Visit date not found    Last OARRS: No flowsheet data found.

## 2023-05-19 ENCOUNTER — OFFICE VISIT (OUTPATIENT)
Dept: FAMILY MEDICINE CLINIC | Age: 63
End: 2023-05-19
Payer: COMMERCIAL

## 2023-05-19 VITALS
DIASTOLIC BLOOD PRESSURE: 76 MMHG | TEMPERATURE: 97.4 F | RESPIRATION RATE: 14 BRPM | OXYGEN SATURATION: 97 % | SYSTOLIC BLOOD PRESSURE: 110 MMHG | HEART RATE: 64 BPM | BODY MASS INDEX: 26.73 KG/M2 | WEIGHT: 181 LBS

## 2023-05-19 DIAGNOSIS — E78.2 MIXED HYPERLIPIDEMIA: ICD-10-CM

## 2023-05-19 DIAGNOSIS — Z00.00 WELL ADULT EXAM: Primary | ICD-10-CM

## 2023-05-19 PROCEDURE — 99396 PREV VISIT EST AGE 40-64: CPT | Performed by: FAMILY MEDICINE

## 2023-05-19 SDOH — ECONOMIC STABILITY: FOOD INSECURITY: WITHIN THE PAST 12 MONTHS, YOU WORRIED THAT YOUR FOOD WOULD RUN OUT BEFORE YOU GOT MONEY TO BUY MORE.: NEVER TRUE

## 2023-05-19 SDOH — ECONOMIC STABILITY: FOOD INSECURITY: WITHIN THE PAST 12 MONTHS, THE FOOD YOU BOUGHT JUST DIDN'T LAST AND YOU DIDN'T HAVE MONEY TO GET MORE.: NEVER TRUE

## 2023-05-19 SDOH — ECONOMIC STABILITY: INCOME INSECURITY: HOW HARD IS IT FOR YOU TO PAY FOR THE VERY BASICS LIKE FOOD, HOUSING, MEDICAL CARE, AND HEATING?: NOT HARD AT ALL

## 2023-05-19 SDOH — ECONOMIC STABILITY: HOUSING INSECURITY
IN THE LAST 12 MONTHS, WAS THERE A TIME WHEN YOU DID NOT HAVE A STEADY PLACE TO SLEEP OR SLEPT IN A SHELTER (INCLUDING NOW)?: NO

## 2023-05-19 ASSESSMENT — ENCOUNTER SYMPTOMS: RESPIRATORY NEGATIVE: 1

## 2023-05-19 NOTE — PROGRESS NOTES
Palpations: Abdomen is soft. There is no hepatomegaly or splenomegaly. Tenderness: There is no abdominal tenderness. Musculoskeletal:      Cervical back: Normal range of motion and neck supple. Lymphadenopathy:      Head:      Right side of head: No submandibular adenopathy. Left side of head: No submandibular adenopathy. Cervical: No cervical adenopathy. Upper Body:      Right upper body: No supraclavicular adenopathy. Left upper body: No supraclavicular adenopathy. Skin:     General: Skin is warm and dry. Capillary Refill: Capillary refill takes less than 2 seconds. Nails: There is no clubbing. Neurological:      General: No focal deficit present. Mental Status: She is alert and oriented to person, place, and time. Cranial Nerves: No cranial nerve deficit. Deep Tendon Reflexes:      Reflex Scores:       Bicep reflexes are 2+ on the right side and 2+ on the left side. Patellar reflexes are 2+ on the right side and 2+ on the left side. Psychiatric:         Mood and Affect: Mood normal.         Behavior: Behavior normal.            An electronic signature was used to authenticate this note.     --Lukas Brooks MD

## 2023-05-26 DIAGNOSIS — N89.8 VAGINAL IRRITATION: ICD-10-CM

## 2023-05-26 RX ORDER — CLINDAMYCIN PHOSPHATE 20 MG/G
CREAM VAGINAL
Qty: 1 G | Refills: 0 | Status: SHIPPED | OUTPATIENT
Start: 2023-05-26 | End: 2023-06-02

## 2023-06-05 RX ORDER — OMEPRAZOLE 20 MG/1
CAPSULE, DELAYED RELEASE ORAL
Qty: 90 CAPSULE | Refills: 3 | OUTPATIENT
Start: 2023-06-05

## 2023-06-06 RX ORDER — OMEPRAZOLE 20 MG/1
20 CAPSULE, DELAYED RELEASE ORAL DAILY
Qty: 90 CAPSULE | Refills: 3 | Status: SHIPPED | OUTPATIENT
Start: 2023-06-06

## 2023-06-06 NOTE — TELEPHONE ENCOUNTER
Medication:   Requested Prescriptions     Pending Prescriptions Disp Refills    omeprazole (PRILOSEC) 20 MG delayed release capsule 90 capsule 3     Sig: Take 1 capsule by mouth Daily        Last Filled:  5/27/22    Patient Phone Number: [phone] (home)     Last appt: 5/19/2023   Next appt: Visit date not found    Last OARRS: No flowsheet data found.

## 2023-06-09 DIAGNOSIS — E78.2 MIXED HYPERLIPIDEMIA: ICD-10-CM

## 2023-06-09 DIAGNOSIS — Z00.00 WELL ADULT EXAM: ICD-10-CM

## 2023-06-09 LAB
ALBUMIN SERPL-MCNC: 4.6 G/DL (ref 3.4–5)
ALBUMIN/GLOB SERPL: 1.8 {RATIO} (ref 1.1–2.2)
ALP SERPL-CCNC: 75 U/L (ref 40–129)
ALT SERPL-CCNC: 20 U/L (ref 10–40)
ANION GAP SERPL CALCULATED.3IONS-SCNC: 10 MMOL/L (ref 3–16)
AST SERPL-CCNC: 21 U/L (ref 15–37)
BILIRUB SERPL-MCNC: 0.7 MG/DL (ref 0–1)
BUN SERPL-MCNC: 13 MG/DL (ref 7–20)
CALCIUM SERPL-MCNC: 10 MG/DL (ref 8.3–10.6)
CHLORIDE SERPL-SCNC: 104 MMOL/L (ref 99–110)
CHOLEST SERPL-MCNC: 166 MG/DL (ref 0–199)
CO2 SERPL-SCNC: 28 MMOL/L (ref 21–32)
CREAT SERPL-MCNC: 0.9 MG/DL (ref 0.6–1.2)
GFR SERPLBLD CREATININE-BSD FMLA CKD-EPI: >60 ML/MIN/{1.73_M2}
GLUCOSE SERPL-MCNC: 91 MG/DL (ref 70–99)
HDLC SERPL-MCNC: 44 MG/DL (ref 40–60)
LDLC SERPL CALC-MCNC: 94 MG/DL
POTASSIUM SERPL-SCNC: 4.8 MMOL/L (ref 3.5–5.1)
PROT SERPL-MCNC: 7.1 G/DL (ref 6.4–8.2)
SODIUM SERPL-SCNC: 142 MMOL/L (ref 136–145)
TRIGL SERPL-MCNC: 140 MG/DL (ref 0–150)
VLDLC SERPL CALC-MCNC: 28 MG/DL

## 2023-07-07 ENCOUNTER — HOSPITAL ENCOUNTER (OUTPATIENT)
Dept: MAMMOGRAPHY | Age: 63
Discharge: HOME | End: 2023-07-07
Payer: COMMERCIAL

## 2023-07-07 VITALS — BODY MASS INDEX: 26.81 KG/M2 | WEIGHT: 181 LBS | HEIGHT: 69 IN

## 2023-07-07 DIAGNOSIS — Z12.31 VISIT FOR SCREENING MAMMOGRAM: ICD-10-CM

## 2023-07-07 PROCEDURE — 77063 BREAST TOMOSYNTHESIS BI: CPT

## 2023-07-26 LAB
CHOLEST SERPL-MCNC: 195 MG/DL (ref 0–199)
GLUCOSE SERPL-MCNC: 73 MG/DL (ref 70–99)
HDLC SERPL-MCNC: 37 MG/DL (ref 40–60)
LDLC SERPL CALC-MCNC: 126 MG/DL
TRIGL SERPL-MCNC: 160 MG/DL (ref 0–150)

## 2023-08-03 DIAGNOSIS — E78.2 MIXED HYPERLIPIDEMIA: ICD-10-CM

## 2023-08-03 RX ORDER — ROSUVASTATIN CALCIUM 10 MG/1
TABLET, COATED ORAL
Qty: 90 TABLET | Refills: 0 | Status: SHIPPED | OUTPATIENT
Start: 2023-08-03

## 2023-08-03 NOTE — TELEPHONE ENCOUNTER
Medication:   Requested Prescriptions     Pending Prescriptions Disp Refills    rosuvastatin (CRESTOR) 10 MG tablet [Pharmacy Med Name: ROSUVASTATIN CALCIUM 10MG TABS] 90 tablet 0     Sig: TAKE ONE TABLET BY MOUTH ONE TIME A DAY        Last Filled:  5/4/23    Patient Phone Number: [phone] (home)     Last appt: 5/19/2023 - Annual exam  Next appt: Visit date not found    Last OARRS: No flowsheet data found.

## 2023-08-15 ENCOUNTER — OFFICE VISIT (OUTPATIENT)
Dept: GYNECOLOGY | Age: 63
End: 2023-08-15
Payer: COMMERCIAL

## 2023-08-15 VITALS
HEIGHT: 69 IN | BODY MASS INDEX: 25.77 KG/M2 | WEIGHT: 174 LBS | SYSTOLIC BLOOD PRESSURE: 122 MMHG | RESPIRATION RATE: 17 BRPM | DIASTOLIC BLOOD PRESSURE: 60 MMHG | HEART RATE: 68 BPM

## 2023-08-15 DIAGNOSIS — Z78.0 MENOPAUSE: ICD-10-CM

## 2023-08-15 DIAGNOSIS — Z01.419 WELL WOMAN EXAM WITH ROUTINE GYNECOLOGICAL EXAM: Primary | ICD-10-CM

## 2023-08-15 PROCEDURE — 99396 PREV VISIT EST AGE 40-64: CPT | Performed by: OBSTETRICS & GYNECOLOGY

## 2023-08-15 RX ORDER — ESTRADIOL 0.1 MG/G
1 CREAM VAGINAL
Qty: 1 EACH | Refills: 3 | Status: ON HOLD | OUTPATIENT
Start: 2023-08-15

## 2023-08-15 ASSESSMENT — ENCOUNTER SYMPTOMS
RESPIRATORY NEGATIVE: 1
ALLERGIC/IMMUNOLOGIC NEGATIVE: 1
GASTROINTESTINAL NEGATIVE: 1
EYES NEGATIVE: 1

## 2023-08-16 NOTE — PROGRESS NOTES
Subjective:      Patient ID: Ivy Thompson is a 61 y.o. female. Patient is here for annual. Patient with vaginal/vulvar irritation with intercourse. Gynecologic Exam      Review of Systems   Constitutional: Negative. HENT: Negative. Eyes: Negative. Respiratory: Negative. Cardiovascular: Negative. Gastrointestinal: Negative. Endocrine: Negative. Genitourinary:  Positive for vaginal pain. Musculoskeletal: Negative. Skin: Negative. Allergic/Immunologic: Negative. Neurological: Negative. Hematological: Negative. Psychiatric/Behavioral: Negative.        Date of Birth 1960  Past Medical History:   Diagnosis Date    Allergic rhinitis     Chicken pox     Chondromalacia patellae, left knee     Dyspareunia     History of Prinzmetal angina     HPV (human papilloma virus) anogenital infection     Hyperlipidemia     Recurrent cold sores     STD (sexually transmitted disease)     HPV     Past Surgical History:   Procedure Laterality Date    COLONOSCOPY  2020    COLONOSCOPY WITH BIOPSY performed by Cherie Hillman MD at [address] [place] [address] Right     macular hole    KNEE ARTHROSCOPY Left 2018    LEEP      SKIN BIOPSY N/A 2022    EXCISION OF SOFT TISSUE MASS OF LEFT BACK performed by Omari Dumont MD at [address] [place] [address] TAG REMOVAL      TOOTH EXTRACTION      UPPER GASTROINTESTINAL ENDOSCOPY N/A 2020    EGD BIOPSY performed by Cherie Hillman MD at [facility] N/A 2020    EGD Read Parveen performed by Cherie Hillman MD at [facility] N/A 2020    ESOPHAGOGASTRODUODENOSCOPY performed by Hayes Jacobo MD at [facility] ENDOSCOPY     OB History    Para Term  AB Living   3 3 3     3   SAB IAB Ectopic Molar Multiple Live Births             3      # Outcome Date GA Lbr Harish/2nd Weight Sex Delivery Anes PTL Lv   3 Term 26

## 2023-08-19 ENCOUNTER — NURSE TRIAGE (OUTPATIENT)
Dept: OTHER | Facility: CLINIC | Age: 63
End: 2023-08-19

## 2023-08-19 ENCOUNTER — HOSPITAL ENCOUNTER (INPATIENT)
Age: 63
LOS: 1 days | Discharge: HOME | DRG: 378 | End: 2023-08-21
Attending: EMERGENCY MEDICINE | Admitting: INTERNAL MEDICINE
Payer: COMMERCIAL

## 2023-08-19 DIAGNOSIS — K92.2 LOWER GI BLEED: Primary | ICD-10-CM

## 2023-08-19 DIAGNOSIS — K92.2 GASTROINTESTINAL HEMORRHAGE, UNSPECIFIED GASTROINTESTINAL HEMORRHAGE TYPE: ICD-10-CM

## 2023-08-19 LAB
ALBUMIN SERPL-MCNC: 4.1 G/DL (ref 3.4–5)
ALP SERPL-CCNC: 85 U/L (ref 40–129)
ALT SERPL-CCNC: 16 U/L (ref 10–40)
ANION GAP SERPL CALCULATED.3IONS-SCNC: 12 MMOL/L (ref 3–16)
AST SERPL-CCNC: 20 U/L (ref 15–37)
BASOPHILS # BLD: 0 K/UL (ref 0–0.2)
BASOPHILS NFR BLD: 0.3 %
BILIRUB DIRECT SERPL-MCNC: <0.2 MG/DL (ref 0–0.3)
BILIRUB INDIRECT SERPL-MCNC: NORMAL MG/DL (ref 0–1)
BILIRUB SERPL-MCNC: 0.4 MG/DL (ref 0–1)
BUN SERPL-MCNC: 18 MG/DL (ref 7–20)
CALCIUM SERPL-MCNC: 9.6 MG/DL (ref 8.3–10.6)
CHLORIDE SERPL-SCNC: 100 MMOL/L (ref 99–110)
CO2 SERPL-SCNC: 23 MMOL/L (ref 21–32)
CREAT SERPL-MCNC: 0.9 MG/DL (ref 0.6–1.2)
DEPRECATED RDW RBC AUTO: 12.5 % (ref 12.4–15.4)
EOSINOPHIL # BLD: 0.1 K/UL (ref 0–0.6)
EOSINOPHIL NFR BLD: 0.7 %
GFR SERPLBLD CREATININE-BSD FMLA CKD-EPI: >60 ML/MIN/{1.73_M2}
GLUCOSE SERPL-MCNC: 151 MG/DL (ref 70–99)
HCT VFR BLD AUTO: 38.8 % (ref 36–48)
HGB BLD-MCNC: 13.5 G/DL (ref 12–16)
LIPASE SERPL-CCNC: 31 U/L (ref 13–60)
LYMPHOCYTES # BLD: 0.9 K/UL (ref 1–5.1)
LYMPHOCYTES NFR BLD: 8.7 %
MCH RBC QN AUTO: 30.9 PG (ref 26–34)
MCHC RBC AUTO-ENTMCNC: 34.8 G/DL (ref 31–36)
MCV RBC AUTO: 88.7 FL (ref 80–100)
MONOCYTES # BLD: 0.6 K/UL (ref 0–1.3)
MONOCYTES NFR BLD: 5.8 %
NEUTROPHILS # BLD: 9 K/UL (ref 1.7–7.7)
NEUTROPHILS NFR BLD: 84.5 %
PLATELET # BLD AUTO: 221 K/UL (ref 135–450)
PMV BLD AUTO: 7.9 FL (ref 5–10.5)
POTASSIUM SERPL-SCNC: 4.3 MMOL/L (ref 3.5–5.1)
PROT SERPL-MCNC: 7.1 G/DL (ref 6.4–8.2)
RBC # BLD AUTO: 4.37 M/UL (ref 4–5.2)
SODIUM SERPL-SCNC: 135 MMOL/L (ref 136–145)
WBC # BLD AUTO: 10.6 K/UL (ref 4–11)

## 2023-08-19 PROCEDURE — 85610 PROTHROMBIN TIME: CPT

## 2023-08-19 PROCEDURE — 96375 TX/PRO/DX INJ NEW DRUG ADDON: CPT

## 2023-08-19 PROCEDURE — 96374 THER/PROPH/DIAG INJ IV PUSH: CPT

## 2023-08-19 PROCEDURE — 85730 THROMBOPLASTIN TIME PARTIAL: CPT

## 2023-08-19 PROCEDURE — 83690 ASSAY OF LIPASE: CPT

## 2023-08-19 PROCEDURE — 99285 EMERGENCY DEPT VISIT HI MDM: CPT

## 2023-08-19 PROCEDURE — 85025 COMPLETE CBC W/AUTO DIFF WBC: CPT

## 2023-08-19 PROCEDURE — 80076 HEPATIC FUNCTION PANEL: CPT

## 2023-08-19 PROCEDURE — 80048 BASIC METABOLIC PNL TOTAL CA: CPT

## 2023-08-19 ASSESSMENT — PAIN DESCRIPTION - LOCATION: LOCATION: ABDOMEN

## 2023-08-19 ASSESSMENT — ENCOUNTER SYMPTOMS
NAUSEA: 1
DIARRHEA: 1
VOMITING: 1
RESPIRATORY NEGATIVE: 1
ABDOMINAL PAIN: 1
EYES NEGATIVE: 1
BLOOD IN STOOL: 1
ANAL BLEEDING: 1

## 2023-08-19 ASSESSMENT — PAIN - FUNCTIONAL ASSESSMENT: PAIN_FUNCTIONAL_ASSESSMENT: 0-10

## 2023-08-19 ASSESSMENT — PAIN DESCRIPTION - DESCRIPTORS: DESCRIPTORS: PRESSURE;DISCOMFORT;ACHING

## 2023-08-19 ASSESSMENT — PAIN SCALES - GENERAL: PAINLEVEL_OUTOF10: 6

## 2023-08-19 ASSESSMENT — PAIN DESCRIPTION - ORIENTATION: ORIENTATION: LOWER

## 2023-08-20 ENCOUNTER — APPOINTMENT (OUTPATIENT)
Dept: CT IMAGING | Age: 63
DRG: 378 | End: 2023-08-20
Payer: COMMERCIAL

## 2023-08-20 PROBLEM — K92.2 GI BLEED: Status: ACTIVE | Noted: 2023-08-20

## 2023-08-20 LAB
ABO + RH BLD: NORMAL
APTT BLD: 23.8 SEC (ref 22.7–35.9)
BLD GP AB SCN SERPL QL: NORMAL
HCT VFR BLD AUTO: 38.2 % (ref 36–48)
HCT VFR BLD AUTO: 39 % (ref 36–48)
HGB BLD-MCNC: 13.2 G/DL (ref 12–16)
HGB BLD-MCNC: 13.2 G/DL (ref 12–16)
INR PPP: 0.98 (ref 0.84–1.16)
PROTHROMBIN TIME: 13 SEC (ref 11.5–14.8)

## 2023-08-20 PROCEDURE — 86850 RBC ANTIBODY SCREEN: CPT

## 2023-08-20 PROCEDURE — 2060000000 HC ICU INTERMEDIATE R&B

## 2023-08-20 PROCEDURE — 86901 BLOOD TYPING SEROLOGIC RH(D): CPT

## 2023-08-20 PROCEDURE — 36415 COLL VENOUS BLD VENIPUNCTURE: CPT

## 2023-08-20 PROCEDURE — 6370000000 HC RX 637 (ALT 250 FOR IP): Performed by: INTERNAL MEDICINE

## 2023-08-20 PROCEDURE — 85018 HEMOGLOBIN: CPT

## 2023-08-20 PROCEDURE — 2580000003 HC RX 258: Performed by: INTERNAL MEDICINE

## 2023-08-20 PROCEDURE — 86900 BLOOD TYPING SEROLOGIC ABO: CPT

## 2023-08-20 PROCEDURE — 85014 HEMATOCRIT: CPT

## 2023-08-20 PROCEDURE — 6360000002 HC RX W HCPCS: Performed by: EMERGENCY MEDICINE

## 2023-08-20 PROCEDURE — 6360000004 HC RX CONTRAST MEDICATION: Performed by: EMERGENCY MEDICINE

## 2023-08-20 PROCEDURE — 74174 CTA ABD&PLVS W/CONTRAST: CPT

## 2023-08-20 RX ORDER — ONDANSETRON 2 MG/ML
4 INJECTION INTRAMUSCULAR; INTRAVENOUS ONCE
Status: COMPLETED | OUTPATIENT
Start: 2023-08-20 | End: 2023-08-20

## 2023-08-20 RX ORDER — ONDANSETRON 2 MG/ML
4 INJECTION INTRAMUSCULAR; INTRAVENOUS EVERY 6 HOURS PRN
Status: DISCONTINUED | OUTPATIENT
Start: 2023-08-20 | End: 2023-08-21 | Stop reason: HOSPADM

## 2023-08-20 RX ORDER — SODIUM CHLORIDE 0.9 % (FLUSH) 0.9 %
5-40 SYRINGE (ML) INJECTION PRN
Status: DISCONTINUED | OUTPATIENT
Start: 2023-08-20 | End: 2023-08-21 | Stop reason: HOSPADM

## 2023-08-20 RX ORDER — SODIUM CHLORIDE 9 MG/ML
INJECTION, SOLUTION INTRAVENOUS PRN
Status: DISCONTINUED | OUTPATIENT
Start: 2023-08-20 | End: 2023-08-21 | Stop reason: HOSPADM

## 2023-08-20 RX ORDER — SODIUM CHLORIDE 0.9 % (FLUSH) 0.9 %
5-40 SYRINGE (ML) INJECTION EVERY 12 HOURS SCHEDULED
Status: DISCONTINUED | OUTPATIENT
Start: 2023-08-20 | End: 2023-08-21 | Stop reason: HOSPADM

## 2023-08-20 RX ORDER — ONDANSETRON 4 MG/1
4 TABLET, ORALLY DISINTEGRATING ORAL EVERY 8 HOURS PRN
Status: DISCONTINUED | OUTPATIENT
Start: 2023-08-20 | End: 2023-08-21 | Stop reason: HOSPADM

## 2023-08-20 RX ORDER — ACETAMINOPHEN 325 MG/1
650 TABLET ORAL EVERY 6 HOURS PRN
Status: DISCONTINUED | OUTPATIENT
Start: 2023-08-20 | End: 2023-08-21 | Stop reason: HOSPADM

## 2023-08-20 RX ORDER — SODIUM CHLORIDE 9 MG/ML
INJECTION, SOLUTION INTRAVENOUS CONTINUOUS
Status: ACTIVE | OUTPATIENT
Start: 2023-08-20 | End: 2023-08-20

## 2023-08-20 RX ORDER — FENTANYL CITRATE 50 UG/ML
25 INJECTION, SOLUTION INTRAMUSCULAR; INTRAVENOUS ONCE
Status: COMPLETED | OUTPATIENT
Start: 2023-08-20 | End: 2023-08-20

## 2023-08-20 RX ORDER — ACETAMINOPHEN 650 MG/1
650 SUPPOSITORY RECTAL EVERY 6 HOURS PRN
Status: DISCONTINUED | OUTPATIENT
Start: 2023-08-20 | End: 2023-08-21 | Stop reason: HOSPADM

## 2023-08-20 RX ADMIN — IOPAMIDOL 75 ML: 755 INJECTION, SOLUTION INTRAVENOUS at 00:36

## 2023-08-20 RX ADMIN — FENTANYL CITRATE 25 MCG: 50 INJECTION, SOLUTION INTRAMUSCULAR; INTRAVENOUS at 01:48

## 2023-08-20 RX ADMIN — SODIUM CHLORIDE: 9 INJECTION, SOLUTION INTRAVENOUS at 10:23

## 2023-08-20 RX ADMIN — SODIUM CHLORIDE: 9 INJECTION, SOLUTION INTRAVENOUS at 03:23

## 2023-08-20 RX ADMIN — POLYETHYLENE GLYCOL-3350 AND ELECTROLYTES 4000 ML: 236; 6.74; 5.86; 2.97; 22.74 POWDER, FOR SOLUTION ORAL at 13:45

## 2023-08-20 RX ADMIN — ONDANSETRON 4 MG: 2 INJECTION INTRAMUSCULAR; INTRAVENOUS at 01:46

## 2023-08-20 ASSESSMENT — PAIN DESCRIPTION - ORIENTATION
ORIENTATION: LOWER
ORIENTATION: MID;LOWER
ORIENTATION: LOWER

## 2023-08-20 ASSESSMENT — PAIN SCALES - GENERAL
PAINLEVEL_OUTOF10: 3
PAINLEVEL_OUTOF10: 6
PAINLEVEL_OUTOF10: 4
PAINLEVEL_OUTOF10: 3
PAINLEVEL_OUTOF10: 2
PAINLEVEL_OUTOF10: 3

## 2023-08-20 ASSESSMENT — PAIN DESCRIPTION - DESCRIPTORS
DESCRIPTORS: CRAMPING
DESCRIPTORS: DISCOMFORT;CRAMPING
DESCRIPTORS: CRAMPING;DISCOMFORT

## 2023-08-20 ASSESSMENT — PAIN DESCRIPTION - LOCATION
LOCATION: ABDOMEN

## 2023-08-20 NOTE — PLAN OF CARE
Problem: Discharge Planning  Goal: Discharge to home or other facility with appropriate resources  Outcome: Progressing     Problem: Pain  Goal: Verbalizes/displays adequate comfort level or baseline comfort level  8/20/2023 1739 by Zee Thomas RN  Outcome: Progressing  8/20/2023 0410 by Amina Schwab RN  Outcome: Progressing     Problem: Skin/Tissue Integrity  Goal: Absence of new skin breakdown  Description: 1. Monitor for areas of redness and/or skin breakdown  2. Assess vascular access sites hourly  3. Every 4-6 hours minimum:  Change oxygen saturation probe site  4. Every 4-6 hours:  If on nasal continuous positive airway pressure, respiratory therapy assess nares and determine need for appliance change or resting period.   Outcome: Progressing     Problem: Hematologic - Adult  Goal: Maintains hematologic stability  8/20/2023 1739 by Zee Thomas RN  Outcome: Progressing  8/20/2023 0410 by Amina Schwab RN  Outcome: Progressing

## 2023-08-20 NOTE — PLAN OF CARE
Problem: Pain  Goal: Verbalizes/displays adequate comfort level or baseline comfort level  Outcome: Progressing     Problem: Hematologic - Adult  Goal: Maintains hematologic stability  Outcome: Progressing

## 2023-08-20 NOTE — PROGRESS NOTES
4 Eyes Skin Assessment     NAME:  Meredith Mcclain  YOB: 1960  MEDICAL RECORD NUMBER:  [id number]    The patient is being assessed for  Transfer to New Unit    I agree that at least one RN has performed a thorough Head to Toe Skin Assessment on the patient. ALL assessment sites listed below have been assessed. Areas assessed by both nurses:    Head, Face, Ears, Shoulders, Back, Chest, Arms, Elbows, Hands, Sacrum. Buttock, Coccyx, Ischium, and Legs. Feet and Heels        Does the Patient have a Wound?  No noted wound(s)       Jose J Prevention initiated by RN: No  Wound Care Orders initiated by RN: No    Pressure Injury (Stage 3,4, Unstageable, DTI, NWPT, and Complex wounds) if present, place Wound referral order by RN under : No    New Ostomies, if present place, Ostomy referral order under : No     Nurse 1 eSignature: Electronically signed by Dagmar Cabrera RN on 8/20/23 at 7:28 PM EDT    **SHARE this note so that the co-signing nurse can place an eSignature**    Nurse 2 eSignature: Electronically signed by Lesley Daigle RN on 8/20/23 at 7:29 PM EDT

## 2023-08-20 NOTE — PROGRESS NOTES
1814:  Report received from [place], [address] [place] [address]:  Pt arrived to [facility]. VS obtained, orientation to room performed. Pt verbalizes understanding of POC (Colt-kavya, NPO at midnight). Bowel sounds active x 4 quad. No tenderness to palpation. Standard fall precautions in place. Pt denies any other needs at this time.

## 2023-08-20 NOTE — PROGRESS NOTES
Patient seen and examined in the AM. Patient admitted in the AM today by my colleague and I agree with their assessment and plan with the addition to the following:     Plan for C Scope tomorrow by GI. Had cramping abdominal pain and nausea till AM but had already resolved by the time I saw her. Still with bloody stool.      Electronically signed by Veronica Vargas MD on 8/20/2023 at 1:38 PM

## 2023-08-20 NOTE — H&P
V2.0  History and Physical      Name:  Artem Huynh /Age/Sex: 1960  (61 y.o. female)   MRN & CSN:  [id number] & [id number] Encounter Date/Time: 2023 2:15 AM EDT   Location:  [place] [address] PCP: Mracos Ramos MD       Hospital Day: 2    Assessment and Plan:   Artem Huynh is a 61 y.o. female with a pmh of hyperlipidemia who presents with acute onset rectal bleeding    Hospital Problems             Last Modified POA    * (Principal) GI bleed 2023 Yes   #Lower GI bleed, likely diverticular  #Hyperlipidemia    Plan:  Continue on clear liquid diet  Monitor H&H every 6 hourly, type and screen  GI consult  Pain relief as needed  DVT prophylaxis with SCDs  Supportive therapy    Disposition:   Current Living situation: Home  Expected Disposition: Home  Estimated D/C: To be decided    Diet ADULT DIET; Clear Liquid   DVT Prophylaxis [] Lovenox, []  Heparin, [x] SCDs, [] Ambulation,  [] Eliquis, [] Xarelto, [] Coumadin   Code Status Full Code   Surrogate Decision Maker/ POA Patient     Personally reviewed Lab Studies are CBC, BMP and Imaging     Discussed management of the case with ED provider who recommended admission for further management    EKG not indicated    Imaging that was interpreted personally includes CTA abdomen and pelvis and results with no acute bleed, suspected diverticular bleeding which is not active    History from:     patient    History of Present Illness:     Chief Complaint: Acute onset bleeding per rectum  Artem Huynh is a 61 y.o. female with pmh as mentioned above presents with complaints of lower abdominal pain and rectal bleeding. Patient states when she was eating dinner yesterday she started having crampy abdominal pain in her lower abdomen which was associated with nausea and one episode of vomiting which was nonbilious, nonbloody. Vomitus mostly contained what she had eaten for dinner. Patient then started having diarrhea, which was initially brown.   Patient had several bowel movements since then which became more bloody with clots. Continues to have abdominal pain which is colicky in nature intermittently. Denies prior episodes of rectal bleeding even though she has history of hemorrhoids. Few months ago patient had an episode similar to today's but did not have bleeding per rectum. Patient does not take any antiplatelets or anticoagulation  Has had colonoscopy in the past (unable to remember the year) which showed colonic polyps and hemorrhoids. Denies feeling dizzy, lightheaded, chest pain, palpitations or shortness of breath    Review of Systems:        Pertinent positives and negatives discussed in HPI     Objective:   No intake or output data in the 24 hours ending 08/20/23 0215   Vitals:   Vitals:    08/19/23 2330 08/20/23 0000 08/20/23 0100 08/20/23 0130   BP: 135/82 (!) 143/79 (!) 140/74 (!) 146/85   Pulse: 63 57 61 62   Resp: 18 15 15 15   Temp:       TempSrc:       SpO2: 97% 96% 94% 95%   Weight:       Height:           Medications Prior to Admission     Prior to Admission medications    Medication Sig Start Date End Date Taking?  Authorizing Provider   estradiol (ESTRACE VAGINAL) 0.1 MG/GM vaginal cream Place 1 g vaginally Twice a Week 8/15/23   Manny Last MD   rosuvastatin (CRESTOR) 10 MG tablet TAKE ONE TABLET BY MOUTH ONE TIME A DAY 8/3/23   Monico Valdes MD   omeprazole (PRILOSEC) 20 MG delayed release capsule Take 1 capsule by mouth Daily 6/6/23   Monico Valdes MD   valACYclovir (VALTREX) 1 g tablet TAKE TWO TABLETS BY MOUTH TWICE A DAY FOR 2 DOSES AS NEEDED 4/25/23   Monico Valdes MD   Turmeric (QC TUMERIC COMPLEX PO) Take 1 capsule by mouth daily     Historical Provider, MD   vitamin B-12 (CYANOCOBALAMIN) 100 MCG tablet Take 0.5 tablets by mouth daily    Historical Provider, MD   Vitamin D (CHOLECALCIFEROL) 1000 UNITS CAPS capsule Take 1 capsule by mouth daily    Historical Provider, MD       Physical Exam:        General: NAD,

## 2023-08-20 NOTE — CONSULTS
Gastrointestinal Consult Note    Patient: Ace Ellis CSN: [id number]     YOB: 1960  Age: 61 y.o. Sex: female    Unit: [address] [facility] Room/Bed: 1320/7559-78 Location: [address] [place] [address]     Admitting Physician: Cory Bradford    Date of  Admission: 8/19/2023   Admission type: Emergency  Primary Care Physician: Nigel Aranda MD          Referring Physician:     Chief Complaint: Acute onset rectal bleeding  Consult Date: 8/20/2023     Subjective:     History of Present Illness: Ace Ellis is a 61 y.o. female who is seen at the request of Cory Bradford for acute onset rectal bleeding    This a very pleasant 80-year-old female who was in her usual state of health until yesterday    The patient was out doing some things she had a acute onset of abdominal discomfort she then had rectal bleeding and also she had vomiting    There is no evidence that she had any vomiting of blood    But she continued to have blood come per rectum    She was somewhat dizzy and lightheaded  She was feeling quite bloated and distended she came to the emergency room for evaluation    During her evaluation. ..  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

## 2023-08-20 NOTE — PROGRESS NOTES
Report given to [facility] on [facility].  Patient transported via wheelchair to [facility]

## 2023-08-20 NOTE — ED NOTES
ED TO INPATIENT SBAR HANDOFF    Patient Name: Jerry Luna   :  1960  61 y.o. MRN:  [id number]  Preferred Name  Donell Schmitz   ED Room #:  O41/A76-14  Family/Caregiver Present no   Restraints no   Sitter no   Sepsis Risk Score Sepsis Risk Score: 0.91    Situation  Code Status: Full Code No additional code details. Allergies: Penicillins and Sulfa antibiotics  Weight: Patient Vitals for the past 96 hrs (Last 3 readings):   Weight   23 2312 178 lb 9.6 oz (81 kg)     Arrived from: home  Chief Complaint:   Chief Complaint   Patient presents with    Abdominal Pain     Patient is having lower ABD pain that started this afternoon. Rectal Bleeding     Hospital Problem/Diagnosis:  Principal Problem:    GI bleed  Resolved Problems:    * No resolved hospital problems.  *    Imaging:   CTA ABDOMEN PELVIS W CONTRAST   Final Result   Findings most consistent with sigmoid diverticular bleeding which is not active   at the time of this exam.           Abnormal labs:   Abnormal Labs Reviewed   CBC WITH AUTO DIFFERENTIAL - Abnormal; Notable for the following components:       Result Value    Neutrophils Absolute 9.0 (*)     Lymphocytes Absolute 0.9 (*)     All other components within normal limits   BASIC METABOLIC PANEL - Abnormal; Notable for the following components:    Sodium 135 (*)     Glucose 151 (*)     All other components within normal limits     Critical values: no     Abnormal Assessment Findings: None     Background  History:   Past Medical History:   Diagnosis Date    Allergic rhinitis     Chicken pox     Chondromalacia patellae, left knee     Dyspareunia     History of Prinzmetal angina     HPV (human papilloma virus) anogenital infection     Hyperlipidemia     Recurrent cold sores     STD (sexually transmitted disease)     HPV       Assessment    Vitals/MEWS: MEWS Score: 0  Level of Consciousness: Alert (0)   Vitals:    23 0000 23 0100 23 0130 23 0200   BP: (!) 143/79 (!)

## 2023-08-21 ENCOUNTER — ANESTHESIA (OUTPATIENT)
Dept: ENDOSCOPY | Age: 63
DRG: 378 | End: 2023-08-21
Payer: COMMERCIAL

## 2023-08-21 ENCOUNTER — ANESTHESIA EVENT (OUTPATIENT)
Dept: ENDOSCOPY | Age: 63
DRG: 378 | End: 2023-08-21
Payer: COMMERCIAL

## 2023-08-21 VITALS
SYSTOLIC BLOOD PRESSURE: 130 MMHG | HEART RATE: 60 BPM | HEIGHT: 69 IN | RESPIRATION RATE: 16 BRPM | BODY MASS INDEX: 25.68 KG/M2 | OXYGEN SATURATION: 97 % | WEIGHT: 173.4 LBS | DIASTOLIC BLOOD PRESSURE: 75 MMHG | TEMPERATURE: 98 F

## 2023-08-21 LAB
HCT VFR BLD AUTO: 37 % (ref 36–48)
HGB BLD-MCNC: 12.8 G/DL (ref 12–16)

## 2023-08-21 PROCEDURE — 7100000010 HC PHASE II RECOVERY - FIRST 15 MIN: Performed by: INTERNAL MEDICINE

## 2023-08-21 PROCEDURE — 36415 COLL VENOUS BLD VENIPUNCTURE: CPT

## 2023-08-21 PROCEDURE — 2500000003 HC RX 250 WO HCPCS: Performed by: NURSE ANESTHETIST, CERTIFIED REGISTERED

## 2023-08-21 PROCEDURE — 3609017100 HC EGD: Performed by: INTERNAL MEDICINE

## 2023-08-21 PROCEDURE — 3609010300 HC COLONOSCOPY W/BIOPSY SINGLE/MULTIPLE: Performed by: INTERNAL MEDICINE

## 2023-08-21 PROCEDURE — 85014 HEMATOCRIT: CPT

## 2023-08-21 PROCEDURE — 0DJ08ZZ INSPECTION OF UPPER INTESTINAL TRACT, VIA NATURAL OR ARTIFICIAL OPENING ENDOSCOPIC: ICD-10-PCS | Performed by: INTERNAL MEDICINE

## 2023-08-21 PROCEDURE — 7100000011 HC PHASE II RECOVERY - ADDTL 15 MIN: Performed by: INTERNAL MEDICINE

## 2023-08-21 PROCEDURE — 85018 HEMOGLOBIN: CPT

## 2023-08-21 PROCEDURE — 2580000003 HC RX 258: Performed by: INTERNAL MEDICINE

## 2023-08-21 PROCEDURE — 3700000001 HC ADD 15 MINUTES (ANESTHESIA): Performed by: INTERNAL MEDICINE

## 2023-08-21 PROCEDURE — 3700000000 HC ANESTHESIA ATTENDED CARE: Performed by: INTERNAL MEDICINE

## 2023-08-21 PROCEDURE — 2709999900 HC NON-CHARGEABLE SUPPLY: Performed by: INTERNAL MEDICINE

## 2023-08-21 PROCEDURE — 6360000002 HC RX W HCPCS: Performed by: NURSE ANESTHETIST, CERTIFIED REGISTERED

## 2023-08-21 PROCEDURE — 88305 TISSUE EXAM BY PATHOLOGIST: CPT

## 2023-08-21 PROCEDURE — 0DBN8ZX EXCISION OF SIGMOID COLON, VIA NATURAL OR ARTIFICIAL OPENING ENDOSCOPIC, DIAGNOSTIC: ICD-10-PCS | Performed by: INTERNAL MEDICINE

## 2023-08-21 RX ORDER — LIDOCAINE HYDROCHLORIDE 20 MG/ML
INJECTION, SOLUTION EPIDURAL; INFILTRATION; INTRACAUDAL; PERINEURAL PRN
Status: DISCONTINUED | OUTPATIENT
Start: 2023-08-21 | End: 2023-08-21 | Stop reason: SDUPTHER

## 2023-08-21 RX ORDER — PROPOFOL 10 MG/ML
INJECTION, EMULSION INTRAVENOUS CONTINUOUS PRN
Status: DISCONTINUED | OUTPATIENT
Start: 2023-08-21 | End: 2023-08-21 | Stop reason: SDUPTHER

## 2023-08-21 RX ORDER — SODIUM CHLORIDE, SODIUM LACTATE, POTASSIUM CHLORIDE, CALCIUM CHLORIDE 600; 310; 30; 20 MG/100ML; MG/100ML; MG/100ML; MG/100ML
INJECTION, SOLUTION INTRAVENOUS ONCE
Status: COMPLETED | OUTPATIENT
Start: 2023-08-21 | End: 2023-08-21

## 2023-08-21 RX ORDER — METRONIDAZOLE 500 MG/1
500 TABLET ORAL 2 TIMES DAILY
Qty: 14 TABLET | Refills: 0 | Status: SHIPPED | OUTPATIENT
Start: 2023-08-21 | End: 2023-08-28

## 2023-08-21 RX ORDER — LEVOFLOXACIN 500 MG/1
500 TABLET, FILM COATED ORAL DAILY
Qty: 7 TABLET | Refills: 0 | Status: SHIPPED | OUTPATIENT
Start: 2023-08-21 | End: 2023-08-28

## 2023-08-21 RX ADMIN — LIDOCAINE HYDROCHLORIDE 80 MG: 20 INJECTION, SOLUTION EPIDURAL; INFILTRATION; INTRACAUDAL; PERINEURAL at 12:31

## 2023-08-21 RX ADMIN — PROPOFOL 125 MCG/KG/MIN: 10 INJECTION, EMULSION INTRAVENOUS at 12:31

## 2023-08-21 RX ADMIN — Medication 10 ML: at 09:00

## 2023-08-21 RX ADMIN — SODIUM CHLORIDE, POTASSIUM CHLORIDE, SODIUM LACTATE AND CALCIUM CHLORIDE: 600; 310; 30; 20 INJECTION, SOLUTION INTRAVENOUS at 11:34

## 2023-08-21 ASSESSMENT — PAIN SCALES - WONG BAKER
WONGBAKER_NUMERICALRESPONSE: 0
WONGBAKER_NUMERICALRESPONSE: 0

## 2023-08-21 ASSESSMENT — ENCOUNTER SYMPTOMS
NAUSEA: 0
DIARRHEA: 0
WHEEZING: 0
ABDOMINAL DISTENTION: 0
EYE PAIN: 0
BACK PAIN: 0
CHEST TIGHTNESS: 0
ABDOMINAL PAIN: 0
COUGH: 0
VOMITING: 0
STRIDOR: 0
CHOKING: 0
BLOOD IN STOOL: 0
SHORTNESS OF BREATH: 0
CONSTIPATION: 0

## 2023-08-21 ASSESSMENT — PAIN SCALES - GENERAL: PAINLEVEL_OUTOF10: 0

## 2023-08-21 NOTE — DISCHARGE SUMMARY
08/19/23  2320 08/20/23  1505 08/20/23  2148 08/21/23  0257   *  --   --   --    K 4.3  --   --   --      --   --   --    CO2 23  --   --   --    BUN 18  --   --   --    CREATININE 0.9  --   --   --    WBC 10.6  --   --   --    HCT 38.8 39.0 38.2 37.0     --   --   --        IMAGING:  CTA ABDOMEN PELVIS W CONTRAST    Result Date: 8/20/2023  Exam: CTA ABDOMEN PELVIS W CONTRAST INDICATION: abdominal pain, rectal bleeding COMPARISON: None. TECHNIQUE: Helically-acquired axial images were obtained through the abdomen and pelvis. Multiplanar reformats were reconstructed. 3D reconstructions were also created for evaluation. Up-to-date CT equipment and radiation dose reduction techniques were employed. IV Contrast: 75 cc Isovue 370 Oral Contrast: None. FINDINGS: VASCULAR: ABDOMINAL AORTA: The abdominal aorta is within normal limits. CELIAC ARTERY: Widely patent. SUPERIOR MESENTERIC ARTERY (SMA): Widely patent. INFERIOR MESENTERIC ARTERY (VICK): Patent. RENAL ARTERIES: Single bilateral renal arteries are widely patent. ILIAC VESSELS: No significant atherosclerotic disease or stenosis. NON-VASCULAR: LOWER CHEST: Lung bases are clear. No acute findings. LIVER: Normal morphology. No suspicious hepatic lesion. GALLBLADDER AND BILIARY TREE: Gallbladder not distended. No intrahepatic or extrahepatic biliary dilatation. PANCREAS: No evidence of mass or inflammation. SPLEEN: Unremarkable. ADRENAL GLANDS: Adrenal glands are normal. KIDNEYS AND URETERS: No suspicious renal lesion. No renal calculi or hydronephrosis. GASTROINTESTINAL: Scattered diverticulosis. There is contrast material within a diverticula of the sigmoid colon, series 702 image 190 and series 305 image 191. Dense material within the rectal vault consistent with blood products. No active contrast blush is identified. Appendix is normal. LYMPH NODES: No pathologically enlarged lymph nodes. PERITONEUM/RETROPERITONEUM: No free air or ascites.  PELVIC ORGANS AND BLADDER: Unremarkable. BODY WALL AND SOFT TISSUES: Unremarkable. BONES: No acute or suspicious abnormality. Degenerative changes throughout the spine. Findings most consistent with sigmoid diverticular bleeding which is not active at the time of this exam.     EGD    Result Date: 8/21/2023  No dictation     Colonoscopy    Result Date: 8/21/2023  No dictation       Additional Information: Patient seen and examined day of discharge.  For more information regarding patient's care please contact [address] [place] [address] records [phone]    Discharge Time of 51 minutes    Electronically signed by Tanvi Roberts MD on 8/21/2023 at 3:16 PM

## 2023-08-21 NOTE — PLAN OF CARE
Problem: Discharge Planning  Goal: Discharge to home or other facility with appropriate resources  8/21/2023 1319 by Chikis Bowen RN  Outcome: Progressing  Flowsheets (Taken 8/21/2023 0750)  Discharge to home or other facility with appropriate resources:   Identify barriers to discharge with patient and caregiver   Arrange for needed discharge resources and transportation as appropriate   Identify discharge learning needs (meds, wound care, etc)   Refer to discharge planning if patient needs post-hospital services based on physician order or complex needs related to functional status, cognitive ability or social support system     Problem: Pain  Goal: Verbalizes/displays adequate comfort level or baseline comfort level  8/21/2023 1319 by Chikis Bowen RN  Outcome: Progressing  Flowsheets (Taken 8/21/2023 0750)  Verbalizes/displays adequate comfort level or baseline comfort level:   Encourage patient to monitor pain and request assistance   Assess pain using appropriate pain scale   Administer analgesics based on type and severity of pain and evaluate response   Implement non-pharmacological measures as appropriate and evaluate response

## 2023-08-21 NOTE — ANESTHESIA POSTPROCEDURE EVALUATION
Department of Anesthesiology  Postprocedure Note    Patient: Jerry Luna  MRN: [id number]  YOB: 1960  Date of evaluation: 8/21/2023      Procedure Summary     Date: 08/21/23 Room / Location: [address]    Anesthesia Start: 1227 Anesthesia Stop: 1311    Procedures:       EGD ESOPHAGOGASTRODUODENOSCOPY      COLONOSCOPY WITH BIOPSY/COLD SNARE POLYPECTOMY  Diagnosis:       Gastrointestinal hemorrhage, unspecified gastrointestinal hemorrhage type      (Gastrointestinal hemorrhage, unspecified gastrointestinal hemorrhage type [K92.2])    Surgeons: Melina Webb MD Responsible Provider: Lindsey Estevez MD    Anesthesia Type: MAC ASA Status: 2          Anesthesia Type: No value filed.     Pepper Phase I: Pepper Score: 10    Pepper Phase II: Pepper Score: 10      Anesthesia Post Evaluation    Patient location during evaluation: PACU  Patient participation: complete - patient participated  Level of consciousness: awake  Pain score: 0  Airway patency: patent  Nausea & Vomiting: no nausea  Complications: no  Cardiovascular status: hemodynamically stable  Respiratory status: acceptable  Hydration status: stable  Pain management: satisfactory to patient

## 2023-08-21 NOTE — H&P
Gastroenterology Note             Pre-operative History and Physical    Patient: Soumya Marroquin  : 1960  CSN: [id number]    History Obtained From:  Patient and/or guardian. HISTORY OF PRESENT ILLNESS:    Indication: The patient is a 61 y.o. female  here for EGD/colonoscopy. Abdominal pain in association with nausea/vomiting and bloody diarrhea. Past Medical History:    Past Medical History:   Diagnosis Date    Allergic rhinitis     Chicken pox     Chondromalacia patellae, left knee     Dyspareunia     History of Prinzmetal angina     HPV (human papilloma virus) anogenital infection     Hyperlipidemia     Recurrent cold sores     STD (sexually transmitted disease)     HPV     Past Surgical History:    Past Surgical History:   Procedure Laterality Date    COLONOSCOPY  2020    COLONOSCOPY WITH BIOPSY performed by Balbir Ma MD at [address] [place] [address] Right     macular hole    KNEE ARTHROSCOPY Left 2018    LEEP      SKIN BIOPSY N/A 2022    EXCISION OF SOFT TISSUE MASS OF LEFT BACK performed by Amarjit Dewey MD at [address] [place] [address] TAG REMOVAL      TOOTH EXTRACTION      UPPER GASTROINTESTINAL ENDOSCOPY N/A 2020    EGD BIOPSY performed by Balbir Ma MD at [facility] N/A 2020    EGD Marianela Campos performed by Balbir Ma MD at [facility] N/A 2020    ESOPHAGOGASTRODUODENOSCOPY performed by Mitzy Ro MD at [facility] ENDOSCOPY     Medications Prior to Admission:   No current facility-administered medications on file prior to encounter.      Current Outpatient Medications on File Prior to Encounter   Medication Sig Dispense Refill    rosuvastatin (CRESTOR) 10 MG tablet TAKE ONE TABLET BY MOUTH ONE TIME A DAY 90 tablet 0    omeprazole (PRILOSEC) 20 MG delayed release capsule Take 1 capsule by mouth Daily 90 capsule 3    Turmeric (QC

## 2023-08-21 NOTE — OP NOTE
EGD and Colonoscopy Procedure  Note            Patient: Ace Ellis  : 1960  CSN: [id number]    Procedure: Esophagogastroduodenoscopy and Colonoscopy with biopsy and cold snare polypectomy      Date:  2023     Surgeon:  Danna Harris MD     Referring Physician:   Nigel Aranda MD    Preoperative Diagnosis: Nausea, vomiting, abdominal pain, bloody diarrhea    Postoperative Diagnosis: Hiatal hernia, Schatzki ring, internal hemorrhoids, diverticulosis, rectosigmoid polyp, colitis    Anesthesia:  MAC per anesthesia record     EBL: <5 mL    Indications: This is a 61y.o. year old female who presents today with abrupt onset of nausea, vomiting, abdominal pain followed by diarrhea, bloody in nature. EGD Procedure Details:    Informed consent was obtained from the patient after explanation of indications, benefits and possible risks and complications of the procedures. The patient was then taken to the endoscopy suite, placed in the left lateral decubitus position, and the above IV sedation was administrered. The Olympus gastroscope was passed through the hypopharynx into the esophagus. The scope was advanced to the second portion of the duodenum. The mucosa was carefully examined, including gastric retroflexion. The scope was withdrawn and the procedure was terminated with findings as indicated below:     EGD Findings:     Esophagus:   -Z-line was noted at 39cm. There was a 2 cm sliding hiatal hernia.  -Nonstructural scarring within the diameter 20 mm noted just proximal to the hiatal hernia  -The esophagus was otherwise normal.  No evidence of Bonilla's esophagus or esophagitis. Stomach:   -The stomach was normal.     Duodenum:   -The examined duodenum was normal.     Gastric or Duodenal ulcer present: No      Colonoscopy Procedure Details:     An informed consent was obtained from the patient after explanation of indications, benefits, possible risks and complications of the colitis.   Recommend initiation of antibiotic therapy such as levofloxacin and metronidazole for 7-day course.   -Low-fat and low fiber diet recommended until abdominal pain and diarrhea resolved  -Okay for hospital discharge from GI standpoint with return criteria if worsening pain or bleeding     Alton Andrade  8/21/2023

## 2023-08-21 NOTE — CARE COORDINATION
Case Management Assessment  Initial Evaluation    Date/Time of Evaluation: 8/21/2023 11:15 AM  Assessment Completed by: NADYA Carcamo   for [facility])  Office Phone: [phone]  [address] [place] [address] Mobile: [phone]    If patient is discharged prior to next notation, then this note serves as note for discharge by case management. Patient Name: Mohsen Yoo                   YOB: 1960  Diagnosis: GI bleed [K92.2]  Lower GI bleed [K92.2]                   Date / Time: 8/19/2023 10:48 PM    Patient Admission Status: Inpatient   Readmission Risk (Low < 19, Mod (19-27), High > 27): Readmission Risk Score: 5.3    Current PCP: Monico Valdes MD  PCP verified by CM? Yes    Chart Reviewed: Yes      History Provided by: Patient  Patient Orientation: Alert and Oriented    Patient Cognition: Alert    Hospitalization in the last 30 days (Readmission):  No    If yes, Readmission Assessment in CM Navigator will be completed. Advance Directives:      Code Status: Full Code   Patient's Primary Decision Maker is: Legal Next of Kin    Primary Decision [facility] - [phone]    Discharge Planning:    Patient lives with: Spouse/Significant Other Type of Home: House  Primary Care Giver: Self  Patient Support Systems include: Spouse/Significant Other   Current Financial resources: Other (Comment) (Kristy)  Current community resources: None  Current services prior to admission: None            Current DME:              Type of Home Care services:  None    ADLS  Prior functional level: Independent in ADLs/IADLs  Current functional level: Independent in ADLs/IADLs    PT AM-PAC:   /24  OT AM-PAC:   /24    Family can provide assistance at DC: Yes  Would you like Case Management to discuss the discharge plan with any other family members/significant others, and if so, who?  Yes  Plans to Return to Present Housing: Yes  Other Identified

## 2023-08-21 NOTE — ACP (ADVANCE CARE PLANNING)
Advance Care Planning     General Advance Care Planning (ACP) Conversation    Date of Conversation: 8/21/2023  Conducted with: Patient with Decision Making Capacity    Healthcare Decision Maker:    Primary Decision Maker: Georgie Beltran - Spouse - [phone]  Click here to complete Healthcare Decision Makers including selection of the Healthcare Decision Maker Relationship (ie \"Primary\"). Today we documented Decision Maker(s) consistent with Legal Next of Kin hierarchy.     Length of Voluntary ACP Conversation in minutes:  <16 minutes (Non-Billable)    NADYA Ventura   for Ti Knight and [address] [place] [address] ([address])  Office Phone: [phone] [address]: [phone]

## 2023-08-21 NOTE — PROGRESS NOTES
AVS reviewed with patient and spouse. All questions answered. Patient expressed understanding of new medications and how to pick them up. Reviewed follow up appointments that need to be made and low fat/low fiber diet. Reviewed symptoms that would cause for return to ED.

## 2023-08-21 NOTE — PROGRESS NOTES
In-Patient Progress Note    Patient:  Mango Moreno 61 y.o. female MRN: [id number]     Date of Service: 8/21/2023    Hospital Day: 3      Chief complaint: had concerns including Abdominal Pain (Patient is having lower ABD pain that started this afternoon. ) and Rectal Bleeding. Subjective   Patient seen and examined in the morning. Patient states she no longer has abdominal cramps. States she feels better. Does not think she has had any further bleeding rectally. See ROS    I have reviewed all pertinent PMHx, PSHx, FamHx, SocialHx, medications, and allergies and updated history as appropriate. I have reviewed images as appropriate. Assessment and [address], a 61 y.o. female, with a history of HLD was admitted on 8/19/2023 with complaints of had concerns including Abdominal Pain (Patient is having lower ABD pain that started this afternoon. ) and Rectal Bleeding. Assessment and Plan:  GI bleed 2/2 Likely Lower GI bleed 2/2 ? Diverticulosis   Hb 12.8 today, no significant drop from 13.5 on admission    On H/H Q6H still   GI on board   Plan for C Scope today   No longer has cramping abdominal pain     Hyponatremia   Initial Na 135    H/O HLD       Plan  F/U C Scope today   F/U GI recs      # Peptic ulcer prophylaxis: -   # DVT Prophylaxis: SCD      Expected Disposition: Home  Estimated discharge date: Today vs Tomorrow     Personally reviewed Lab Studies and Imaging     Discussed management of the case during IDR - no needs. May go home today vs tomorrow based on C Scope results. Review of System     Review of Systems   Constitutional:  Negative for chills, fatigue, fever and unexpected weight change. Eyes:  Negative for pain and visual disturbance. Respiratory:  Negative for cough, choking, chest tightness, shortness of breath, wheezing and stridor. Cardiovascular:  Negative for chest pain, palpitations and leg swelling.    Gastrointestinal:  Negative for abdominal distention, GALLBLADDER AND BILIARY TREE: Gallbladder not distended. No intrahepatic or extrahepatic biliary dilatation. PANCREAS: No evidence of mass or inflammation. SPLEEN: Unremarkable. ADRENAL GLANDS: Adrenal glands are normal. KIDNEYS AND URETERS: No suspicious renal lesion. No renal calculi or hydronephrosis. GASTROINTESTINAL: Scattered diverticulosis. There is contrast material within a diverticula of the sigmoid colon, series 702 image 190 and series 305 image 191. Dense material within the rectal vault consistent with blood products. No active contrast blush is identified. Appendix is normal. LYMPH NODES: No pathologically enlarged lymph nodes. PERITONEUM/RETROPERITONEUM: No free air or ascites. PELVIC ORGANS AND BLADDER: Unremarkable. BODY WALL AND SOFT TISSUES: Unremarkable. BONES: No acute or suspicious abnormality. Degenerative changes throughout the spine.      Findings most consistent with sigmoid diverticular bleeding which is not active at the time of this exam.       Recent Results (from the past 24 hour(s))   Hemoglobin and Hematocrit    Collection Time: 08/20/23  3:05 PM   Result Value Ref Range    Hemoglobin 13.2 12.0 - 16.0 g/dL    Hematocrit 39.0 36.0 - 48.0 %   TYPE AND SCREEN    Collection Time: 08/20/23  3:05 PM   Result Value Ref Range    ABO/Rh B POS     Antibody Screen NEG    Hemoglobin and Hematocrit    Collection Time: 08/20/23  9:48 PM   Result Value Ref Range    Hemoglobin 13.2 12.0 - 16.0 g/dL    Hematocrit 38.2 36.0 - 48.0 %   Hemoglobin and Hematocrit    Collection Time: 08/21/23  2:57 AM   Result Value Ref Range    Hemoglobin 12.8 12.0 - 16.0 g/dL    Hematocrit 37.0 36.0 - 48.0 %           Electronically signed by La Vanegas MD on 8/21/2023 at 9:58 AM

## 2023-08-21 NOTE — DISCHARGE INSTRUCTIONS
Internal Medicine Discharge Instruction    Discharge to:  Home  Diet: Low fat, low fiber diet  Activity: As tolerated       Be compliant with medications  Please take medications as prescribed   Please come back to the hospital if worsening diarrhea/bloody bowel movement/abdominal pain

## 2023-08-21 NOTE — PLAN OF CARE
Problem: Discharge Planning  Goal: Discharge to home or other facility with appropriate resources  8/21/2023 3589 by Mamta Green RN  Outcome: Progressing  Flowsheets (Taken 8/20/2023 1951)  Discharge to home or other facility with appropriate resources: Identify barriers to discharge with patient and caregiver     Problem: Pain  Goal: Verbalizes/displays adequate comfort level or baseline comfort level  8/21/2023 0632 by Mamta Green RN  Outcome: Progressing  Flowsheets (Taken 8/21/2023 4267)  Verbalizes/displays adequate comfort level or baseline comfort level:   Assess pain using appropriate pain scale   Encourage patient to monitor pain and request assistance   Consider cultural and social influences on pain and pain management     Problem: Skin/Tissue Integrity  Goal: Absence of new skin breakdown  Description: 1. Monitor for areas of redness and/or skin breakdown  2. Assess vascular access sites hourly  3. Every 4-6 hours minimum:  Change oxygen saturation probe site  4. Every 4-6 hours:  If on nasal continuous positive airway pressure, respiratory therapy assess nares and determine need for appliance change or resting period. 8/21/2023 4629 by Mamta Green RN  Outcome: Progressing  Note: Pt has no new signs of skin breakdown.        Problem: Hematologic - Adult  Goal: Maintains hematologic stability  8/21/2023 0632 by Mamta Green RN  Outcome: Progressing  Flowsheets (Taken 8/20/2023 1951)  Maintains hematologic stability:   Assess for signs and symptoms of bleeding or hemorrhage   Monitor labs for bleeding or clotting disorders   Administer blood products/factors as ordered     Problem: Safety - Adult  Goal: Free from fall injury  Outcome: Progressing  Flowsheets (Taken 8/21/2023 1010)  Free From Fall Injury: Instruct family/caregiver on patient safety     Problem: ABCDS Injury Assessment  Goal: Absence of physical injury  Outcome: Progressing  Flowsheets (Taken 8/21/2023 0503)  Absence of

## 2023-08-22 ENCOUNTER — CARE COORDINATION (OUTPATIENT)
Dept: OTHER | Facility: CLINIC | Age: 63
End: 2023-08-22

## 2023-08-22 NOTE — CARE COORDINATION
Care Transitions Outreach Attempt    Call within 2 business days of discharge: Yes   Attempted to reach patient for transitions of care follow up. Unable to reach patient. Patient: Nirav Stacy Patient : 1960 MRN: [id number]    Last Discharge [address]       Date Complaint Diagnosis Description Type Department Provider    23 Abdominal Pain; Rectal Bleeding Lower GI bleed . .. ED to Hosp-Admission (Discharged) (ADMITTED) Lavelle Denis MD; Letty Hitchcock .. HIPAA compliant message left requesting a return phone call at patients convenience. Will continue to follow. Was this an external facility discharge? No Discharge Facility: [facility]     Noted following upcoming appointments from discharge chart review:   [facility] follow up appointment(s):   Future Appointments   Date Time Provider [address]   2023 11:30 AM MD Petrona Cardoza [facility] [address] [facility] follow up appointment(s): n/a    Elizabeth GARCIA, [address] [place], [place] [phone]  [email or web address]

## 2023-08-22 NOTE — CARE COORDINATION
[facility] Care Transitions Initial Follow Up Call    Call within 2 business days of discharge: Yes    Patient Current Location:  Home: [address] [place] [address] Dr Allyson Hodges [id number]    Care Transition Nurse contacted the patient by telephone to perform post hospital discharge assessment. Verified name and  with patient as identifiers. Provided introduction to self, and explanation of the Care Transition Nurse role. Patient: Courtney Singh Patient : 1960   MRN: [id number]  Reason for Admission: lower GI Bleed   Discharge Date: 23 RARS: Readmission Risk Score: 5.1      Last Discharge [address] [facility]       Date Complaint Diagnosis Description Type Department Provider    23 Abdominal Pain; Rectal Bleeding Lower GI bleed . .. ED to Hosp-Admission (Discharged) (ADMITTED) Leland Morin MD; Liliya Chan .. Was this an external facility discharge? No Discharge Facility:  [facility] to be reviewed by the provider   Additional needs identified to be addressed with provider: No  none               Method of communication with provider: none. Pt returned call. She is not bleeding today, no pain today. She is taking the Levaquin and Flagyl with no issues. She is interested in speaking a dietician in regard to her new low fiber and low fat diet. I will assist in obtaining an RD if possible. Pt is changing her diet and going to the grocery store today. She has a follow up this Friday with Nehemiah SEN in the office. Care Transition Nurse reviewed discharge instructions with patient who verbalized understanding. The patient was given an opportunity to ask questions and does not have any further questions or concerns at this time. Were discharge instructions available to patient? Yes. Reviewed appropriate site of care based on symptoms and resources available to patient including: PCP  Specialist. The patient agrees to contact the PCP office for questions related to their healthcare.

## 2023-08-24 ENCOUNTER — CARE COORDINATION (OUTPATIENT)
Dept: CARE COORDINATION | Age: 63
End: 2023-08-24

## 2023-08-24 NOTE — CARE COORDINATION
[address] [address] [address] and left voicemail regarding Dietitian referral. Left call back number and will follow up as appropriate.        Juan Motta, [address] [facility],   [phone]

## 2023-08-25 ENCOUNTER — CARE COORDINATION (OUTPATIENT)
Dept: CARE COORDINATION | Age: 63
End: 2023-08-25

## 2023-08-28 ENCOUNTER — OFFICE VISIT (OUTPATIENT)
Dept: FAMILY MEDICINE CLINIC | Age: 63
End: 2023-08-28
Payer: COMMERCIAL

## 2023-08-28 VITALS
BODY MASS INDEX: 25.45 KG/M2 | SYSTOLIC BLOOD PRESSURE: 102 MMHG | WEIGHT: 171.8 LBS | HEART RATE: 57 BPM | TEMPERATURE: 97.3 F | HEIGHT: 69 IN | OXYGEN SATURATION: 97 % | DIASTOLIC BLOOD PRESSURE: 62 MMHG

## 2023-08-28 DIAGNOSIS — K57.31 DIVERTICULAR HEMORRHAGE: ICD-10-CM

## 2023-08-28 DIAGNOSIS — R53.83 FATIGUE, UNSPECIFIED TYPE: ICD-10-CM

## 2023-08-28 DIAGNOSIS — R06.02 SHORTNESS OF BREATH: ICD-10-CM

## 2023-08-28 DIAGNOSIS — K57.31 DIVERTICULAR HEMORRHAGE: Primary | ICD-10-CM

## 2023-08-28 DIAGNOSIS — L65.9 HAIR LOSS: ICD-10-CM

## 2023-08-28 DIAGNOSIS — E78.2 MIXED HYPERLIPIDEMIA: ICD-10-CM

## 2023-08-28 PROCEDURE — 99214 OFFICE O/P EST MOD 30 MIN: CPT | Performed by: FAMILY MEDICINE

## 2023-08-28 ASSESSMENT — PATIENT HEALTH QUESTIONNAIRE - PHQ9
1. LITTLE INTEREST OR PLEASURE IN DOING THINGS: 0
SUM OF ALL RESPONSES TO PHQ QUESTIONS 1-9: 0
SUM OF ALL RESPONSES TO PHQ QUESTIONS 1-9: 0
2. FEELING DOWN, DEPRESSED OR HOPELESS: 0
SUM OF ALL RESPONSES TO PHQ9 QUESTIONS 1 & 2: 0
SUM OF ALL RESPONSES TO PHQ QUESTIONS 1-9: 0
SUM OF ALL RESPONSES TO PHQ QUESTIONS 1-9: 0

## 2023-08-28 NOTE — CARE COORDINATION
Mango Moreno  August 25, 2023    Initial Referral Reason: Ulcerative Colitis Nutrition Therapy    Patient Care Team:  Aneta Warner MD as PCP - General (Family Medicine)  Aneta Warner MD as PCP - Empaneled Provider  Олег Coleman MD as Obstetrician (Obstetrics & Gynecology)  Lita Goldberg RN as [address] as Dietitian (Dietitian Registered)    Past Medical History:    Current Outpatient Medications   Medication Sig Dispense Refill    levoFLOXacin (LEVAQUIN) 500 MG tablet Take 1 tablet by mouth daily for 7 days 7 tablet 0    metroNIDAZOLE (FLAGYL) 500 MG tablet Take 1 tablet by mouth 2 times daily for 7 days 14 tablet 0    rosuvastatin (CRESTOR) 10 MG tablet TAKE ONE TABLET BY MOUTH ONE TIME A DAY 90 tablet 0    omeprazole (PRILOSEC) 20 MG delayed release capsule Take 1 capsule by mouth Daily 90 capsule 3    Turmeric (QC TUMERIC COMPLEX PO) Take 1 capsule by mouth daily       vitamin B-12 (CYANOCOBALAMIN) 100 MCG tablet Take 0.5 tablets by mouth daily      Vitamin D (CHOLECALCIFEROL) 1000 UNITS CAPS capsule Take 1 capsule by mouth daily       No current facility-administered medications for this visit.        Biochemical Data, Medical Tests and Procedures:    No results found for: LABA1C  No results found for: EAG    Lab Results   Component Value Date    CHOL 195 07/25/2023    CHOL 166 06/09/2023    CHOL 166 04/13/2022     Lab Results   Component Value Date    TRIG 160 (H) 07/25/2023    TRIG 140 06/09/2023    TRIG 135 04/13/2022     Lab Results   Component Value Date    HDL 37 (L) 07/25/2023    HDL 44 06/09/2023    HDL 36 (L) 04/13/2022     Lab Results   Component Value Date    LDLCALC 126 (H) 07/25/2023    LDLCALC 94 06/09/2023    LDLCALC 103 (H) 04/13/2022     Lab Results   Component Value Date    LABVLDL 28 06/09/2023    LABVLDL 24 03/11/2020    LABVLDL 26 03/13/2019     No results found for: [facility]    Lab Results   Component Value Date    WBC 10.6 08/19/2023

## 2023-08-29 LAB
25(OH)D3 SERPL-MCNC: 53.2 NG/ML
ANION GAP SERPL CALCULATED.3IONS-SCNC: 9 MMOL/L (ref 3–16)
BUN SERPL-MCNC: 16 MG/DL (ref 7–20)
CALCIUM SERPL-MCNC: 10 MG/DL (ref 8.3–10.6)
CHLORIDE SERPL-SCNC: 99 MMOL/L (ref 99–110)
CO2 SERPL-SCNC: 28 MMOL/L (ref 21–32)
CREAT SERPL-MCNC: 0.8 MG/DL (ref 0.6–1.2)
DEPRECATED RDW RBC AUTO: 13.4 % (ref 12.4–15.4)
FERRITIN SERPL IA-MCNC: 294.3 NG/ML (ref 15–150)
GFR SERPLBLD CREATININE-BSD FMLA CKD-EPI: >60 ML/MIN/{1.73_M2}
GLUCOSE SERPL-MCNC: 109 MG/DL (ref 70–99)
HCT VFR BLD AUTO: 41.5 % (ref 36–48)
HGB BLD-MCNC: 13.8 G/DL (ref 12–16)
MCH RBC QN AUTO: 30.4 PG (ref 26–34)
MCHC RBC AUTO-ENTMCNC: 33.3 G/DL (ref 31–36)
MCV RBC AUTO: 91.3 FL (ref 80–100)
PLATELET # BLD AUTO: 274 K/UL (ref 135–450)
PMV BLD AUTO: 8.3 FL (ref 5–10.5)
POTASSIUM SERPL-SCNC: 4.3 MMOL/L (ref 3.5–5.1)
RBC # BLD AUTO: 4.55 M/UL (ref 4–5.2)
SODIUM SERPL-SCNC: 136 MMOL/L (ref 136–145)
TSH SERPL DL<=0.005 MIU/L-ACNC: 2.36 UIU/ML (ref 0.27–4.2)
WBC # BLD AUTO: 7.3 K/UL (ref 4–11)

## 2023-08-30 ENCOUNTER — CARE COORDINATION (OUTPATIENT)
Dept: OTHER | Facility: CLINIC | Age: 63
End: 2023-08-30

## 2023-08-30 NOTE — CARE COORDINATION
Care Transitions Follow Up Call    ACM attempted to reach patient for Care Transitions follow up call. HIPAA compliant message left requesting a return phone call at patient convenience. Plan for follow-up call in 5-7 days    Future Appointments   Date Time Provider 4600  46[facility]   9/5/2023 11:30 AM MD Biju Menjivar [facility]   9/8/2023  8:30 AM DEXA [employer]   9/8/2023  2:00 PM SCHEDULE, [address] ECHO JEVON CARDIO [facility]         Shelly GARCIA, RN- [facility]  Associate Care Manager  [phone]  [email or web address]

## 2023-09-01 ENCOUNTER — CARE COORDINATION (OUTPATIENT)
Dept: OTHER | Facility: CLINIC | Age: 63
End: 2023-09-01

## 2023-09-01 NOTE — CARE COORDINATION
Care Transitions Follow Up Call    ACM attempted to reach patient for Care Transitions follow up call. HIPAA compliant message left requesting a return phone call at patient convenience. Plan for follow-up call in 5-7 days    Future Appointments   Date Time Provider 4600  46[facility]   9/5/2023 11:30 AM MD Trudy Garcia   9/8/2023  8:30 AM DEXA [email or web address] [facility]   9/8/2023  2:00 PM SCHEDULE, [address] ECHO JEVON CARDIO [facility]         Margarita BAUMN, RN- [facility]  Associate Care Manager  [phone]  [email or web address]

## 2023-09-05 ENCOUNTER — OFFICE VISIT (OUTPATIENT)
Dept: DERMATOLOGY | Age: 63
End: 2023-09-05
Payer: COMMERCIAL

## 2023-09-05 ENCOUNTER — CARE COORDINATION (OUTPATIENT)
Dept: OTHER | Facility: CLINIC | Age: 63
End: 2023-09-05

## 2023-09-05 DIAGNOSIS — Z86.018 HISTORY OF DYSPLASTIC NEVUS: ICD-10-CM

## 2023-09-05 DIAGNOSIS — L82.1 SEBORRHEIC KERATOSIS: ICD-10-CM

## 2023-09-05 DIAGNOSIS — L57.0 AK (ACTINIC KERATOSIS): ICD-10-CM

## 2023-09-05 DIAGNOSIS — B00.9 HSV INFECTION: ICD-10-CM

## 2023-09-05 DIAGNOSIS — D22.9 MULTIPLE NEVI: Primary | ICD-10-CM

## 2023-09-05 PROCEDURE — 99213 OFFICE O/P EST LOW 20 MIN: CPT | Performed by: DERMATOLOGY

## 2023-09-05 RX ORDER — ESTRADIOL 0.1 MG/G
1 CREAM VAGINAL
Qty: 1 EACH | Refills: 3 | Status: SHIPPED | OUTPATIENT
Start: 2023-09-07

## 2023-09-05 NOTE — PROGRESS NOTES
well-appearing  FSE today except for underwear-covered areas    trunk and extremities with scattered brown macules and papules   Vertex of the scalp with tan dull dry thin papule  R plantar surface with medium brown macule -stable  L medial scapular area with well-approximated linear scar   No AK's  L ala clear; no hSV  Upper back, R of midline with scar - clear, no pigment    Assessment and Plan     1. Benign-appearing nevi and SK's including the vertex of the scalp  - Monitor for ABCD's of MM and si/sx of NMSC  Continue sun protection - OTC sunscreen with SPF 30-50+ recommended and reviewed usage  Encouraged skin check yearly (sooner if indicated), self checks    2. History of lipoma - L upper back - previously excised but recurrent, then excised by gen surg -remains clear    3. AK's - clear today  - cont sun protection    4. HSV flares; clear today  - cont valtrex prn flares    5. R upper back - dysplastic nevus - no signs recurrence  - cont Monitor for ABCD's of MM and si/sx of NMSC  Continue sun protection - OTC sunscreen with SPF 30-50+ recommended and reviewed usage  Encouraged skin check yearly (sooner if indicated), self checks      F/u 1 year if benign.

## 2023-09-05 NOTE — CARE COORDINATION
resources available to patient including: PCP  Specialist   . The patient agrees to contact the PCP office for questions related to their healthcare. Advance Care Planning:   not on file. Offered patient enrollment in the Remote Patient Monitoring (RPM) program for in-home monitoring: NA.     Care Transitions Subsequent and Final Call    Subsequent and Final Calls  Do you have any ongoing symptoms?: No  Have your medications changed?: No  Do you have any questions related to your medications?: No  Do you currently have any active services?: No  Do you have any needs or concerns that I can assist you with?: No  Identified Barriers: None  Care Transitions Interventions  Other Interventions:             Care Transition Nurse provided contact information for future needs. Plan for follow-up call in 7-10 days based on severity of symptoms and risk factors.   Plan for next call:  Elimination pattern, diet, outcome from [address], RN

## 2023-09-08 ENCOUNTER — HOSPITAL ENCOUNTER (OUTPATIENT)
Dept: GENERAL RADIOLOGY | Age: 63
Discharge: HOME | End: 2023-09-08
Attending: OBSTETRICS & GYNECOLOGY
Payer: COMMERCIAL

## 2023-09-08 ENCOUNTER — PROCEDURE VISIT (OUTPATIENT)
Dept: CARDIOLOGY CLINIC | Age: 63
End: 2023-09-08

## 2023-09-08 DIAGNOSIS — R06.02 SHORTNESS OF BREATH: ICD-10-CM

## 2023-09-08 DIAGNOSIS — R53.83 FATIGUE, UNSPECIFIED TYPE: ICD-10-CM

## 2023-09-08 DIAGNOSIS — Z78.0 MENOPAUSE: ICD-10-CM

## 2023-09-08 PROCEDURE — 77080 DXA BONE DENSITY AXIAL: CPT

## 2023-09-13 ENCOUNTER — CARE COORDINATION (OUTPATIENT)
Dept: OTHER | Facility: CLINIC | Age: 63
End: 2023-09-13

## 2023-09-13 NOTE — CARE COORDINATION
Care Transitions Follow Up Call    ACM attempted to reach patient for Care Transitions follow up call. HIPAA compliant message left requesting a return phone call at patient convenience. Plan for follow-up call in 7-10 days    No future appointments. Channing GARCIA, RN- [facility]  Associate Care Manager  [phone]  [email or web address]

## 2023-09-15 ENCOUNTER — TELEPHONE (OUTPATIENT)
Dept: GYNECOLOGY | Age: 63
End: 2023-09-15

## 2023-09-15 NOTE — TELEPHONE ENCOUNTER
[facility] and spoke to Rush she says she is having a physician to re - read the Dexa Bone Scan. And we should see the re reading soon.      Contact number is [phone]

## 2023-09-20 ENCOUNTER — CARE COORDINATION (OUTPATIENT)
Dept: OTHER | Facility: CLINIC | Age: 63
End: 2023-09-20

## 2023-09-20 ENCOUNTER — TELEPHONE (OUTPATIENT)
Dept: FAMILY MEDICINE CLINIC | Age: 63
End: 2023-09-20

## 2023-09-20 SDOH — ECONOMIC STABILITY: INCOME INSECURITY: IN THE LAST 12 MONTHS, WAS THERE A TIME WHEN YOU WERE NOT ABLE TO PAY THE MORTGAGE OR RENT ON TIME?: NO

## 2023-09-20 SDOH — ECONOMIC STABILITY: TRANSPORTATION INSECURITY
IN THE PAST 12 MONTHS, HAS LACK OF TRANSPORTATION KEPT YOU FROM MEETINGS, WORK, OR FROM GETTING THINGS NEEDED FOR DAILY LIVING?: NO

## 2023-09-20 SDOH — ECONOMIC STABILITY: TRANSPORTATION INSECURITY
IN THE PAST 12 MONTHS, HAS THE LACK OF TRANSPORTATION KEPT YOU FROM MEDICAL APPOINTMENTS OR FROM GETTING MEDICATIONS?: NO

## 2023-09-20 SDOH — ECONOMIC STABILITY: HOUSING INSECURITY: IN THE LAST 12 MONTHS, HOW MANY PLACES HAVE YOU LIVED?: 1

## 2023-09-20 NOTE — TELEPHONE ENCOUNTER
----- Message from [address] [place] [address] Berna Jefferson sent at 9/20/2023  9:56 AM EDT -----  Regarding: CT Cardiac Calcium Test  Contact: [phone]  Dr. Elizabeth Godoy:    I had a CT Cardiac Calcium Test at [facility] [place] last Friday morning and they were supposed to send the results to you. I am following up as I haven't seen them.     Between that and he EKG, we should have a pretty good understanding of my heart and whether that caused the ischemia, the weight loss, hair loss, dizziness, etc.    Thanks  Leslee Lieberman

## 2023-09-20 NOTE — CARE COORDINATION
Care Transitions Follow Up Call    ACM attempted to reach patient for Care Transitions follow up call. HIPAA compliant message left requesting a return phone call at patient convenience. Plan for follow-up call in 7-10 days    No future appointments. Klaudia GARCIA, RN- [facility]  Associate Care Manager  [phone]  [email or web address]

## 2023-09-25 ENCOUNTER — CARE COORDINATION (OUTPATIENT)
Dept: OTHER | Facility: CLINIC | Age: 63
End: 2023-09-25

## 2023-09-25 ENCOUNTER — CARE COORDINATION (OUTPATIENT)
Dept: CARE COORDINATION | Age: 63
End: 2023-09-25

## 2023-09-25 NOTE — CARE COORDINATION
[address] [address] [address] and left voicemail regarding Dietitian follow up. Left call back number and will follow up as appropriate.        [facility], [address] [place] [address],   [phone]

## 2023-09-25 NOTE — CARE COORDINATION
office for questions related to their healthcare. Advance Care Planning:   not on file. Offered patient enrollment in the Remote Patient Monitoring (RPM) program for in-home monitoring: NA.     Care Transitions Subsequent and Final Call    Subsequent and Final Calls  Do you have any ongoing symptoms?: No  Have your medications changed?: No  Do you have any questions related to your medications?: No  Do you currently have any active services?: No  Do you have any needs or concerns that I can assist you with?: No  Identified Barriers: None  Care Transitions Interventions                          Other Interventions:             Care Transition Nurse provided contact information for future needs. No further follow-up call indicated based on severity of symptoms and risk factors.       Vinicius Sykes RN

## 2023-09-27 ENCOUNTER — CARE COORDINATION (OUTPATIENT)
Dept: CARE COORDINATION | Age: 63
End: 2023-09-27

## 2023-09-28 NOTE — CARE COORDINATION
Ivy Thompson  9/27/2023    Registered Dietitian Progress Note for Care Coordination    Assessment: Shilpi Leung is a 61 y.o. female. RD referred for  Nutrition Therapy. RD spoke with patient for initial nutrition assessment on 8/28/23. RD called to follow up with patient today, 9/27/23. RD discussed previous goals with patient. Patient states that she received the handouts RD sent in the mail, did not have any questions. Patient reports that she has been doing \"better. \" Patient reports that she saw her GI doctor last week, and he told her that the UC was an \"attack,\" and not a chronic issue. Patient is feeling relieved. Patient reports that her appetite has been [place]. \" Patient states that she has been eating more smaller, frequent meals. Patient reports that her stool has been solid. Denies constipation and diarrhea. Patient states that she recently had a DEXA scan, and it revealed Osteopenia. Since then, patient has started a Calcium supplement. RD and patient discussed foods in high calcium. Patient verbalized understanding. RD offered to send handout regarding foods high in calcium, patient declined, stating that she has a good idea of foods high in calcium. Patient reports that she has been having low blood pressure recently, so has decreased her fluid intake and has increase sodium intake. Patient continues to regularly exercise by going for three-mile walks, about five days/week. Patient has no nutrition-related questions/concerns. RD and patient mutually agreed to conclude nutrition counseling this date. RD encouraged patient to reach out should any nutrition-related questions/concerns arise. Patient verbalized understanding and thanked RD. RD will sign off. Plan of Care:  RD encouraged patient to keep working toward goals set. Follow Up:    None.      Nuno Baca, [address] [place] [address],    [phone]

## 2023-11-09 DIAGNOSIS — E78.2 MIXED HYPERLIPIDEMIA: ICD-10-CM

## 2023-11-10 RX ORDER — ROSUVASTATIN CALCIUM 10 MG/1
10 TABLET, COATED ORAL DAILY
Qty: 90 TABLET | Refills: 0 | Status: SHIPPED | OUTPATIENT
Start: 2023-11-10

## 2023-11-13 DIAGNOSIS — B00.1 RECURRENT COLD SORES: ICD-10-CM

## 2023-11-13 RX ORDER — VALACYCLOVIR HYDROCHLORIDE 1 G/1
TABLET, FILM COATED ORAL
Qty: 90 TABLET | Refills: 2 | Status: CANCELLED | OUTPATIENT
Start: 2023-11-13

## 2023-11-13 NOTE — TELEPHONE ENCOUNTER
Medication:   Requested Prescriptions     Pending Prescriptions Disp Refills    valACYclovir (VALTREX) 1 g tablet 90 tablet 2        Last Filled:  8/21/23  Requesting 90 day supply    Patient Phone Number: [phone] (home)     Last appt: 8/28/2023   Next appt: Visit date not found    Last OARRS:        No data to display

## 2023-11-14 ENCOUNTER — PATIENT MESSAGE (OUTPATIENT)
Dept: FAMILY MEDICINE CLINIC | Age: 63
End: 2023-11-14

## 2023-11-14 DIAGNOSIS — E78.2 MIXED HYPERLIPIDEMIA: ICD-10-CM

## 2023-11-14 DIAGNOSIS — B00.1 RECURRENT COLD SORES: ICD-10-CM

## 2023-11-14 RX ORDER — ROSUVASTATIN CALCIUM 10 MG/1
10 TABLET, COATED ORAL DAILY
Qty: 90 TABLET | Refills: 3 | Status: SHIPPED | OUTPATIENT
Start: 2023-11-14

## 2023-11-14 RX ORDER — VALACYCLOVIR HYDROCHLORIDE 1 G/1
1000 TABLET, FILM COATED ORAL DAILY
Qty: 90 TABLET | Refills: 3 | Status: SHIPPED | OUTPATIENT
Start: 2023-11-14

## 2023-11-14 NOTE — TELEPHONE ENCOUNTER
From: Raffy Sykes  To: Dr. Argueta Batch  Sent: 11/14/2023 9:43 AM EST  Subject: Refills for 90 days    Dr. Konstantin Soto: Good morning! I utilize the [facility] and noticed that the Rosuvastatin Calcium 10mg Tabs has no refills. I have been in for my annual physical along with lots of blood work. Could we get this Rx set up for a year's worth of 90 days refills so they don't have to call you all the time? Also, for some reason the Valtax (for fever blisters) has fallen off. When I called [facility], they said the refill was now allowed. I use that Rx for when I have those fever blisters and need that refill in place. Could we get that for a year supply for 90 days refills?     Thanks,

## 2023-11-21 ENCOUNTER — OFFICE VISIT (OUTPATIENT)
Dept: FAMILY MEDICINE CLINIC | Age: 63
End: 2023-11-21
Payer: COMMERCIAL

## 2023-11-21 VITALS
HEART RATE: 61 BPM | SYSTOLIC BLOOD PRESSURE: 110 MMHG | WEIGHT: 167 LBS | OXYGEN SATURATION: 97 % | BODY MASS INDEX: 24.73 KG/M2 | DIASTOLIC BLOOD PRESSURE: 71 MMHG | HEIGHT: 69 IN

## 2023-11-21 DIAGNOSIS — J02.9 SORE THROAT: ICD-10-CM

## 2023-11-21 DIAGNOSIS — R05.1 ACUTE COUGH: Primary | ICD-10-CM

## 2023-11-21 DIAGNOSIS — R68.89 COLD SWEAT: ICD-10-CM

## 2023-11-21 DIAGNOSIS — R52 BODY ACHES: ICD-10-CM

## 2023-11-21 PROCEDURE — 87880 STREP A ASSAY W/OPTIC: CPT | Performed by: NURSE PRACTITIONER

## 2023-11-21 PROCEDURE — 87804 INFLUENZA ASSAY W/OPTIC: CPT | Performed by: NURSE PRACTITIONER

## 2023-11-21 PROCEDURE — 99213 OFFICE O/P EST LOW 20 MIN: CPT | Performed by: NURSE PRACTITIONER

## 2023-11-21 RX ORDER — ALBUTEROL SULFATE 90 UG/1
2 AEROSOL, METERED RESPIRATORY (INHALATION) 4 TIMES DAILY PRN
Qty: 18 G | Refills: 0 | Status: SHIPPED | OUTPATIENT
Start: 2023-11-21

## 2023-11-21 RX ORDER — AZITHROMYCIN 250 MG/1
250 TABLET, FILM COATED ORAL SEE ADMIN INSTRUCTIONS
Qty: 6 TABLET | Refills: 0 | Status: SHIPPED | OUTPATIENT
Start: 2023-11-21 | End: 2023-11-26

## 2023-11-21 ASSESSMENT — ENCOUNTER SYMPTOMS
WHEEZING: 0
COUGH: 1
EYE PAIN: 0
ABDOMINAL PAIN: 0
VOICE CHANGE: 0
EYE REDNESS: 0
BLOOD IN STOOL: 0
EYE ITCHING: 0
RHINORRHEA: 0
DIARRHEA: 0
EYE DISCHARGE: 0
BACK PAIN: 0
SORE THROAT: 1
SINUS PRESSURE: 0
CONSTIPATION: 0
STRIDOR: 0
SHORTNESS OF BREATH: 0
CHEST TIGHTNESS: 0
SINUS PAIN: 0
CHOKING: 0
PHOTOPHOBIA: 0
VOMITING: 0
TROUBLE SWALLOWING: 0
NAUSEA: 0
COLOR CHANGE: 0

## 2023-11-23 LAB — BACTERIA THROAT AEROBE CULT: NORMAL

## 2023-11-30 PROBLEM — E78.2 MIXED HYPERLIPIDEMIA: Chronic | Status: ACTIVE | Noted: 2017-04-11

## 2023-12-01 ENCOUNTER — TELEPHONE (OUTPATIENT)
Dept: SLEEP CENTER | Age: 63
End: 2023-12-01

## 2023-12-01 NOTE — TELEPHONE ENCOUNTER
Called to schedule an hst per Geri Phillips     Left vm for the pt to return my call     [facility] insurance    Send msg to 's office - needs pfu after we schedule.

## 2023-12-11 ENCOUNTER — HOSPITAL ENCOUNTER (OUTPATIENT)
Dept: SLEEP CENTER | Age: 63
Discharge: HOME | End: 2023-12-11
Attending: INTERNAL MEDICINE
Payer: COMMERCIAL

## 2023-12-11 ENCOUNTER — HOSPITAL ENCOUNTER (OUTPATIENT)
Dept: SLEEP CENTER | Age: 63
Discharge: HOME | End: 2023-12-11

## 2023-12-11 DIAGNOSIS — G47.00 INSOMNIA, UNSPECIFIED TYPE: ICD-10-CM

## 2023-12-11 DIAGNOSIS — G47.10 HYPERSOMNIA: ICD-10-CM

## 2023-12-11 PROCEDURE — 95806 SLEEP STUDY UNATT&RESP EFFT: CPT

## 2023-12-28 PROBLEM — G47.33 OBSTRUCTIVE SLEEP APNEA SYNDROME: Status: ACTIVE | Noted: 2023-12-28

## 2023-12-28 PROBLEM — G47.00 INSOMNIA: Status: ACTIVE | Noted: 2023-12-28

## 2024-01-15 ENCOUNTER — OFFICE VISIT (OUTPATIENT)
Dept: FAMILY MEDICINE CLINIC | Age: 64
End: 2024-01-15
Payer: COMMERCIAL

## 2024-01-15 ENCOUNTER — HOSPITAL ENCOUNTER (OUTPATIENT)
Dept: GENERAL RADIOLOGY | Age: 64
Discharge: HOME | End: 2024-01-15
Payer: COMMERCIAL

## 2024-01-15 ENCOUNTER — NURSE TRIAGE (OUTPATIENT)
Dept: OTHER | Facility: CLINIC | Age: 64
End: 2024-01-15

## 2024-01-15 VITALS
BODY MASS INDEX: 26.22 KG/M2 | SYSTOLIC BLOOD PRESSURE: 110 MMHG | TEMPERATURE: 97.5 F | WEIGHT: 177 LBS | OXYGEN SATURATION: 97 % | DIASTOLIC BLOOD PRESSURE: 68 MMHG | HEART RATE: 62 BPM | HEIGHT: 69 IN
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
## 2024-01-15 DIAGNOSIS — R07.81 RIB PAIN ON LEFT SIDE: ICD-10-CM

## 2024-01-15 DIAGNOSIS — R07.81 RIB PAIN ON LEFT SIDE: Primary | ICD-10-CM

## 2024-01-15 PROCEDURE — 99214 OFFICE O/P EST MOD 30 MIN: CPT | Performed by: NURSE PRACTITIONER

## 2024-01-15 PROCEDURE — 71101 X-RAY EXAM UNILAT RIBS/CHEST: CPT

## 2024-01-15 RX ORDER — CALCIUM CARBONATE 500(1250)
500 TABLET ORAL DAILY
COMMUNITY

## 2024-01-15 RX ORDER — LIDOCAINE 50 MG/G
1 PATCH TOPICAL DAILY
Qty: 10 PATCH | Refills: 0 | Status: SHIPPED | OUTPATIENT
Start: 2024-01-15 | End: 2024-01-25

## 2024-01-15 RX ORDER — MULTIVITAMIN WITH IRON
250 TABLET ORAL DAILY
COMMUNITY

## 2024-01-15 ASSESSMENT — ENCOUNTER SYMPTOMS
ABDOMINAL PAIN: 0
STRIDOR: 0
RHINORRHEA: 0
EYE PAIN: 0
BLOOD IN STOOL: 0
DIARRHEA: 0
COLOR CHANGE: 0
CHEST TIGHTNESS: 0
CONSTIPATION: 0
VOICE CHANGE: 0
SORE THROAT: 0
WHEEZING: 0
BACK PAIN: 0
CHOKING: 0
EYE DISCHARGE: 0
VOMITING: 0
SHORTNESS OF BREATH: 0
NAUSEA: 0
TROUBLE SWALLOWING: 0
EYE REDNESS: 0
COUGH: 0
SINUS PAIN: 0
EYE ITCHING: 0
SINUS PRESSURE: 0
PHOTOPHOBIA: 0

## 2024-01-15 ASSESSMENT — PATIENT HEALTH QUESTIONNAIRE - PHQ9
1. LITTLE INTEREST OR PLEASURE IN DOING THINGS: 0
SUM OF ALL RESPONSES TO PHQ QUESTIONS 1-9: 0
SUM OF ALL RESPONSES TO PHQ9 QUESTIONS 1 & 2: 0
2. FEELING DOWN, DEPRESSED OR HOPELESS: 0
SUM OF ALL RESPONSES TO PHQ QUESTIONS 1-9: 0

## 2024-01-15 NOTE — PROGRESS NOTES
Chief Complaint   Patient presents with    Rib Injury     Last night around 9:30/10:00 pm- Slipped down a step and landed on her back. Wind was knocked out and she could not breathe. Is unable to lay, laugh, cough, hard and painful to breathe.       /68   Pulse 62   Temp 97.5 °F (36.4 °C) (Temporal)   Ht 1.753 m (5' 9\")   Wt 80.3 kg (177 lb)   LMP 2012   SpO2 97%   BMI 26.14 kg/m²     HPI:  Paz Jefferson is a 63 y.o. (: 1960) here today   for   HPI    Patient's medications, allergies, past medical, surgical, social and family histories were reviewed and updated as appropriate.    Fall down stairs: fell last night, fell and knocked wind out of her, is unable to lay, laugh, hard/painful to breathe. Did not catch herself, hit hard, carpeted stairs, hit then slid down 4 stairs. Did not hit head, did not see any bruising yet, using ice. Take nsaids. More pain with inhale. Pain is left flank and does gown pain with touch, constant.     Suspect rib fx    ROS:  Review of Systems   Constitutional:  Positive for activity change and appetite change. Negative for chills, diaphoresis, fatigue, fever and unexpected weight change.   HENT:  Negative for congestion, ear discharge, ear pain, hearing loss, nosebleeds, postnasal drip, rhinorrhea, sinus pressure, sinus pain, sneezing, sore throat, tinnitus, trouble swallowing and voice change.    Eyes:  Negative for photophobia, pain, discharge, redness and itching.   Respiratory:  Negative for cough, choking, chest tightness, shortness of breath, wheezing and stridor.    Cardiovascular:  Negative for chest pain, palpitations and leg swelling.   Gastrointestinal:  Negative for abdominal pain, blood in stool, constipation, diarrhea, nausea and vomiting.   Endocrine: Negative for cold intolerance, heat intolerance, polydipsia and polyuria.   Genitourinary:  Negative for difficulty urinating, dysuria, enuresis, flank pain, frequency, hematuria and urgency.

## 2024-01-15 NOTE — TELEPHONE ENCOUNTER
Location of patient: [place]    Subjective: Caller states \"fell down stairs last night.\"     Current Symptoms: Fell down steps last night-about 4-5 steps.  Concerned about cracked ribs.  On left side from shoulder to waist  Hurts to touch, breath or cough  No visible bruising  Not able to sleep well  Hard to take a deep breath    Onset:  last night    Associated Symptoms: NA    Pain Severity: 8/10    Temperature:  denies    What has been tried: ice, ibuprofen    LMP: NA Pregnant: NA    Recommended disposition: Go to ED Now    Care advice provided, patient verbalizes understanding; denies any other questions or concerns; instructed to call back for any new or worsening symptoms.    Patient/caller agrees to proceed to   Emergency Department    Attention Provider:  Thank you for allowing me to participate in the care of your patient.  The patient was connected to triage in response to symptoms provided.   Please do not respond through this encounter as the response is not directed to a shared pool.    Reason for Disposition   SEVERE chest pain    Protocols used: Chest Injury-ADULT-[place]

## 2024-01-17 ENCOUNTER — TELEPHONE (OUTPATIENT)
Dept: FAMILY MEDICINE CLINIC | Age: 64
End: 2024-01-17

## 2024-01-17 ENCOUNTER — PATIENT MESSAGE (OUTPATIENT)
Dept: FAMILY MEDICINE CLINIC | Age: 64
End: 2024-01-17

## 2024-01-17 NOTE — TELEPHONE ENCOUNTER
From: Paz Jefferson  To: Samantha Villeda  Sent: 1/17/2024 3:21 PM EST  Subject: Follow Up to Fall    Samantha:  It is day three since my fall on Sunday (1/14) and I am slowly feeling better. I have been able to sleep, on my back, which is so much better than the first night.  I still have a sharp pain on my left side that goes from back to front. Hurts to cough, laugh and move. No bruising. No swelling.   I am taking ibuprofen (2) and acetaminophen (1), a few times a day (6 hours apart). I am still icing.   My urine and stools are good and look normal. I know if there is blood, it may need microscope.  I wanted to give you an update.   Thanks,  Paz

## 2024-01-17 NOTE — TELEPHONE ENCOUNTER
Please contact [facility] regarding, whether or not the lidocaine patch Rx can be changed from 10-30.  The box comes in 30 and they're not able to break up the box.

## 2024-01-18 DIAGNOSIS — R52 SHARP PAIN: ICD-10-CM

## 2024-01-18 DIAGNOSIS — R05.8 PAINFUL COUGH: ICD-10-CM
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
## 2024-01-18 DIAGNOSIS — R07.81 RIB PAIN ON LEFT SIDE: Primary | ICD-10-CM

## 2024-01-18 DIAGNOSIS — R52 PAINFUL COUGH: ICD-10-CM

## 2024-01-18 DIAGNOSIS — R10.12 LEFT UPPER QUADRANT ABDOMINAL PAIN: ICD-10-CM

## 2024-01-18 NOTE — TELEPHONE ENCOUNTER
Patient is calling in and would like her CT Scan to be sent to [facility]. Please call the patient upon completion so she can call and schedule.

## 2024-01-18 NOTE — TELEPHONE ENCOUNTER
I have been thinking and with your pain and symptoms I do think it is a good idea for an CT chest/abdomen. If cost is an issue I would call [facility], they are typically the cheapest cash wise. I will place this order.

## 2024-01-29 ENCOUNTER — TELEPHONE (OUTPATIENT)
Dept: FAMILY MEDICINE CLINIC | Age: 64
End: 2024-01-29
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
## 2024-01-29 DIAGNOSIS — R07.81 RIB PAIN ON LEFT SIDE: Primary | ICD-10-CM

## 2024-02-01 DIAGNOSIS — K92.2 GASTROINTESTINAL HEMORRHAGE, UNSPECIFIED GASTROINTESTINAL HEMORRHAGE TYPE: ICD-10-CM

## 2024-02-01 DIAGNOSIS — S22.42XS: Primary | ICD-10-CM

## 2024-02-01 RX ORDER — TRAMADOL HYDROCHLORIDE 50 MG/1
50 TABLET ORAL EVERY 6 HOURS PRN
Qty: 12 TABLET | Refills: 0 | Status: SHIPPED | OUTPATIENT
Start: 2024-02-01 | End: 2024-02-04

## 2024-02-01 NOTE — PROGRESS NOTES
Called pt to discuss ct results. 6-8 weeks follow up needed in office from trauma. States breathing ok, states hurts to sleep. Lidocaine patches not doing much. She is taking 400 mg motrin every 4-6 hours and 1 tylenol. She is walking and staying moving. Breathing ok. She is splinting when she coughs. Denies bruising and swelling. She is icing, and taking it day by day. See is drinking milk, taking CA weill eat high protein. Pt states she is fine breathing.     Fatty liver disease, will follow up after getting through rib fracture pain. She see's Dr. Milner she will follow up after getting through rib fractures.       Called pt back second time to review CT abdomen and pelvis.

## 2024-04-24 ENCOUNTER — PATIENT MESSAGE (OUTPATIENT)
Dept: FAMILY MEDICINE CLINIC | Age: 64
End: 2024-04-24

## 2024-04-25 NOTE — TELEPHONE ENCOUNTER
I spoke with pt, she is concerned that the x ray imaging was not preformed or read correctly  I will pass her information on to the managment team for radiology

## 2024-04-25 NOTE — TELEPHONE ENCOUNTER
Called and wants to know why scans show different results. One Is showing no broken ribs, the other is showing not only broken but dislodge ribs and wants to know why they're different

## 2024-04-26 NOTE — TELEPHONE ENCOUNTER
I will say that it's very common for xrays to miss rib fractures (they don't always show up). I didn't realize she was comparing xrays to ct since I haven't been directly involved in this

## 2024-07-08 ENCOUNTER — HOSPITAL ENCOUNTER (OUTPATIENT)
Dept: MAMMOGRAPHY | Age: 64
Discharge: HOME | End: 2024-07-08
Payer: COMMERCIAL

## 2024-07-08 VITALS — HEIGHT: 69 IN | WEIGHT: 177 LBS | BODY MASS INDEX: 26.22 KG/M2

## 2024-07-08 DIAGNOSIS — Z12.31 VISIT FOR SCREENING MAMMOGRAM: ICD-10-CM

## 2024-07-08 PROCEDURE — 77067 SCR MAMMO BI INCL CAD: CPT

## 2024-07-11 ENCOUNTER — OFFICE VISIT (OUTPATIENT)
Dept: FAMILY MEDICINE CLINIC | Age: 64
End: 2024-07-11
Payer: COMMERCIAL

## 2024-07-11 ENCOUNTER — HOSPITAL ENCOUNTER (OUTPATIENT)
Dept: GENERAL RADIOLOGY | Age: 64
Discharge: HOME | End: 2024-07-11
Payer: COMMERCIAL

## 2024-07-11 VITALS
HEIGHT: 69 IN | WEIGHT: 177 LBS | BODY MASS INDEX: 26.22 KG/M2 | OXYGEN SATURATION: 97 % | HEART RATE: 61 BPM | SYSTOLIC BLOOD PRESSURE: 124 MMHG | DIASTOLIC BLOOD PRESSURE: 68 MMHG

## 2024-07-11 DIAGNOSIS — R22.42 LOCALIZED SWELLING OF LEFT FOOT: ICD-10-CM

## 2024-07-11 DIAGNOSIS — R20.0 HAND NUMBNESS: Primary | ICD-10-CM

## 2024-07-11 PROCEDURE — 99213 OFFICE O/P EST LOW 20 MIN: CPT | Performed by: FAMILY MEDICINE

## 2024-07-11 PROCEDURE — 73630 X-RAY EXAM OF FOOT: CPT

## 2024-07-11 SDOH — ECONOMIC STABILITY: INCOME INSECURITY: HOW HARD IS IT FOR YOU TO PAY FOR THE VERY BASICS LIKE FOOD, HOUSING, MEDICAL CARE, AND HEATING?: NOT HARD AT ALL

## 2024-07-11 SDOH — ECONOMIC STABILITY: FOOD INSECURITY: WITHIN THE PAST 12 MONTHS, THE FOOD YOU BOUGHT JUST DIDN'T LAST AND YOU DIDN'T HAVE MONEY TO GET MORE.: NEVER TRUE

## 2024-07-11 SDOH — ECONOMIC STABILITY: FOOD INSECURITY: WITHIN THE PAST 12 MONTHS, YOU WORRIED THAT YOUR FOOD WOULD RUN OUT BEFORE YOU GOT MONEY TO BUY MORE.: NEVER TRUE

## 2024-07-11 ASSESSMENT — PATIENT HEALTH QUESTIONNAIRE - PHQ9
1. LITTLE INTEREST OR PLEASURE IN DOING THINGS: NOT AT ALL
SUM OF ALL RESPONSES TO PHQ9 QUESTIONS 1 & 2: 0
2. FEELING DOWN, DEPRESSED OR HOPELESS: NOT AT ALL
SUM OF ALL RESPONSES TO PHQ QUESTIONS 1-9: 0

## 2024-07-11 NOTE — PROGRESS NOTES
Paz Jefferson (:  1960) is a 63 y.o. female,Established patient, here for evaluation of the following chief complaint(s):  Other (Broken bone in foot possibly) and Numbness (Both hands)      Assessment & Plan   ASSESSMENT/PLAN:  Paz was seen today for other and numbness.    Diagnoses and all orders for this visit:    Hand numbness  -     HELEN - Lg Albright MD, (EMG), [facility]  Emg to evaluate.   Suspect cts but with neck pain disc dz possible  Localized swelling of left foot  -     XR FOOT LEFT (MIN 3 VIEWS); Future  Xray to evaluate       No follow-ups on file.         Subjective   SUBJECTIVE/OBJECTIVE:  HPI  Pt is a of 63 y.o. female comes in today with   Chief Complaint   Patient presents with    Other     Broken bone in foot possibly    Numbness     Both hands     Last night noticed bump on left side of foot. No pain.   Playing more golf this summer; not sure if it's from that.  No injury.    Numbness in hands. Right is worse. Worse after repetitive activities. Going on a few months.  Pain in neck between shoulder blades.  No weakness.      Vitals:    24 1131   BP: 124/68   Pulse: 61   SpO2: 97%   Weight: 80.3 kg (177 lb)   Height: 1.753 m (5' 9\")       Past Medical History:Reviewed  Medications:Reviewed.  Allergies   Allergen Reactions    Penicillins Hives     Bad rash all over    Sulfa Antibiotics Hives      Social hx:Reviewed.  Social History     Tobacco Use   Smoking Status Never   Smokeless Tobacco Never   Tobacco Comments    Back in high school        Review of Systems       Objective   Physical Exam     Left foot bony nontender bony asymmetry midfoot  Bilateral hand strength and sensation normal  Positive Tinel  Some lower cervical paraspinal tenderness    An electronic signature was used to authenticate this note.    --Christopher Patino MD

## 2024-07-24 RX ORDER — OMEPRAZOLE 20 MG/1
20 CAPSULE, DELAYED RELEASE ORAL DAILY
Qty: 90 CAPSULE | Refills: 3 | Status: SHIPPED | OUTPATIENT
Start: 2024-07-24

## 2024-07-24 NOTE — TELEPHONE ENCOUNTER
Medication:   Requested Prescriptions     Pending Prescriptions Disp Refills    omeprazole (PRILOSEC) 20 MG delayed release capsule 90 capsule 3     Sig: Take 1 capsule by mouth Daily        Last Filled:  6/06/23    Patient Phone Number: [phone] (home)     Last appt: 7/11/2024   Next appt: Visit date not found    Last OARRS:        No data to display

## 2024-09-03 ENCOUNTER — OFFICE VISIT (OUTPATIENT)
Dept: DERMATOLOGY | Age: 64
End: 2024-09-03
Payer: COMMERCIAL

## 2024-09-03 DIAGNOSIS — Z86.018 HISTORY OF DYSPLASTIC NEVUS: ICD-10-CM

## 2024-09-03 DIAGNOSIS — L82.1 SEBORRHEIC KERATOSIS: ICD-10-CM

## 2024-09-03 DIAGNOSIS — B00.9 HSV INFECTION: ICD-10-CM

## 2024-09-03 DIAGNOSIS — D22.9 MULTIPLE NEVI: Primary | ICD-10-CM

## 2024-09-03 DIAGNOSIS — L57.0 AK (ACTINIC KERATOSIS): ICD-10-CM

## 2024-09-03 PROCEDURE — 99213 OFFICE O/P EST LOW 20 MIN: CPT | Performed by: DERMATOLOGY

## 2024-09-03 RX ORDER — ASPIRIN 81 MG/1
81 TABLET ORAL DAILY
COMMUNITY

## 2024-09-04 ENCOUNTER — TELEPHONE (OUTPATIENT)
Dept: FAMILY MEDICINE CLINIC | Age: 64
End: 2024-09-04

## 2024-09-05 ENCOUNTER — TELEPHONE (OUTPATIENT)
Dept: FAMILY MEDICINE CLINIC | Age: 64
End: 2024-09-05

## 2024-09-06 ENCOUNTER — OFFICE VISIT (OUTPATIENT)
Dept: FAMILY MEDICINE CLINIC | Age: 64
End: 2024-09-06
Payer: COMMERCIAL

## 2024-09-06 VITALS
HEIGHT: 69 IN | BODY MASS INDEX: 26.07 KG/M2 | RESPIRATION RATE: 16 BRPM | HEART RATE: 55 BPM | OXYGEN SATURATION: 99 % | WEIGHT: 176 LBS | TEMPERATURE: 97.6 F | SYSTOLIC BLOOD PRESSURE: 124 MMHG | DIASTOLIC BLOOD PRESSURE: 80 MMHG

## 2024-09-06 DIAGNOSIS — Z00.00 WELL ADULT EXAM: Primary | ICD-10-CM

## 2024-09-06 PROCEDURE — 90715 TDAP VACCINE 7 YRS/> IM: CPT | Performed by: FAMILY MEDICINE

## 2024-09-06 PROCEDURE — 99396 PREV VISIT EST AGE 40-64: CPT | Performed by: FAMILY MEDICINE

## 2024-09-06 PROCEDURE — 90471 IMMUNIZATION ADMIN: CPT | Performed by: FAMILY MEDICINE

## 2024-09-06 ASSESSMENT — ENCOUNTER SYMPTOMS: RESPIRATORY NEGATIVE: 1

## 2024-09-06 NOTE — PROGRESS NOTES
Paz Jefferson (:  1960) is a 64 y.o. female,Established patient, here for evaluation of the following chief complaint(s):  Annual Exam      Assessment & Plan   ASSESSMENT/PLAN:  Paz was seen today for annual exam.    Diagnoses and all orders for this visit:    Well adult exam  -     Lipid Panel; Future  -     Comprehensive Metabolic Panel; Future  Good diet and exercise.    Other orders  -     Tdap, BOOSTRIX, (age 10 yrs+), IM         No follow-ups on file.         Subjective   SUBJECTIVE/OBJECTIVE:  HPI  Pt is a of 64 y.o. female comes in today with   Chief Complaint   Patient presents with    Annual Exam     Diet has been good.  Less etoh.  Walking and golf for exercise.  Vitals:    24 0844   BP: 124/80   Pulse: 55   Resp: 16   Temp: 97.6 °F (36.4 °C)   SpO2: 99%   Weight: 79.8 kg (176 lb)   Height: 1.753 m (5' 9\")      Review of Systems   Constitutional: Negative.    Respiratory: Negative.     Cardiovascular: Negative.           Objective   Physical Exam  Constitutional:       General: She is not in acute distress.     Appearance: Normal appearance. She is well-developed.   HENT:      Head: Normocephalic.      Mouth/Throat:      Pharynx: Oropharynx is clear.   Eyes:      General: No scleral icterus.     Conjunctiva/sclera: Conjunctivae normal.   Neck:      Thyroid: No thyromegaly.   Cardiovascular:      Rate and Rhythm: Normal rate.      Heart sounds: Normal heart sounds. No murmur heard.     No gallop.   Pulmonary:      Effort: Pulmonary effort is normal.      Breath sounds: Normal breath sounds. No wheezing.   Abdominal:      General: There is no distension.      Palpations: Abdomen is soft. There is no hepatomegaly or splenomegaly.      Tenderness: There is no abdominal tenderness.   Musculoskeletal:      Cervical back: Normal range of motion and neck supple.   Lymphadenopathy:      Head:      Right side of head: No submandibular adenopathy.      Left side of head: No submandibular adenopathy.

## 2024-09-09 DIAGNOSIS — Z00.00 WELL ADULT EXAM: ICD-10-CM

## 2024-09-09 LAB
ALBUMIN SERPL-MCNC: 4.6 G/DL (ref 3.4–5)
ALBUMIN/GLOB SERPL: 1.9 {RATIO} (ref 1.1–2.2)
ALP SERPL-CCNC: 72 U/L (ref 40–129)
ALT SERPL-CCNC: 20 U/L (ref 10–40)
ANION GAP SERPL CALCULATED.3IONS-SCNC: 10 MMOL/L (ref 3–16)
AST SERPL-CCNC: 22 U/L (ref 15–37)
BILIRUB SERPL-MCNC: 0.8 MG/DL (ref 0–1)
BUN SERPL-MCNC: 16 MG/DL (ref 7–20)
CALCIUM SERPL-MCNC: 10.3 MG/DL (ref 8.3–10.6)
CHLORIDE SERPL-SCNC: 101 MMOL/L (ref 99–110)
CHOLEST SERPL-MCNC: 202 MG/DL (ref 0–199)
CO2 SERPL-SCNC: 27 MMOL/L (ref 21–32)
CREAT SERPL-MCNC: 0.8 MG/DL (ref 0.6–1.2)
GFR SERPLBLD CREATININE-BSD FMLA CKD-EPI: 82 ML/MIN/{1.73_M2}
GLUCOSE SERPL-MCNC: 80 MG/DL (ref 70–99)
HDLC SERPL-MCNC: 41 MG/DL (ref 40–60)
LDLC SERPL CALC-MCNC: 129 MG/DL
POTASSIUM SERPL-SCNC: 4.5 MMOL/L (ref 3.5–5.1)
PROT SERPL-MCNC: 7 G/DL (ref 6.4–8.2)
SODIUM SERPL-SCNC: 138 MMOL/L (ref 136–145)
TRIGL SERPL-MCNC: 158 MG/DL (ref 0–150)
VLDLC SERPL CALC-MCNC: 32 MG/DL

## 2024-11-01 DIAGNOSIS — E78.2 MIXED HYPERLIPIDEMIA: ICD-10-CM

## 2024-11-01 DIAGNOSIS — B00.1 RECURRENT COLD SORES: ICD-10-CM

## 2024-11-01 NOTE — TELEPHONE ENCOUNTER
Medication:   Requested Prescriptions     Pending Prescriptions Disp Refills    omeprazole (PRILOSEC) 20 MG delayed release capsule 90 capsule 3     Sig: Take 1 capsule by mouth Daily       Last Filled:  7/24/24    Patient Phone Number: [phone] (home)     Last appt: 9/6/2024   Next appt: Visit date not found    Last Labs DM: No results found for: \"LABA1C\"  Last Lipid:   Lab Results   Component Value Date/Time    CHOL 202 09/09/2024 08:26 AM    TRIG 158 09/09/2024 08:26 AM    HDL 41 09/09/2024 08:26 AM    HDL 42 02/17/2012 07:42 AM     Last PSA: No results found for: \"PSA\"  Last Thyroid:   Lab Results   Component Value Date/Time    TSH 2.36 08/28/2023 12:57 PM    T4FREE 1.1 08/21/2013 01:38 PM

## 2024-12-13 ENCOUNTER — PATIENT MESSAGE (OUTPATIENT)
Dept: FAMILY MEDICINE CLINIC | Age: 64
End: 2024-12-13

## 2024-12-13 DIAGNOSIS — E78.2 MIXED HYPERLIPIDEMIA: ICD-10-CM

## 2024-12-16 RX ORDER — ROSUVASTATIN CALCIUM 10 MG/1
10 TABLET, COATED ORAL DAILY
Qty: 90 TABLET | Refills: 3 | Status: SHIPPED | OUTPATIENT
Start: 2024-12-16

## 2024-12-16 NOTE — TELEPHONE ENCOUNTER
Medication:   Requested Prescriptions     Pending Prescriptions Disp Refills    rosuvastatin (CRESTOR) 10 MG tablet 90 tablet 3     Sig: Take 1 tablet by mouth daily        Last Filled:  11/14/23    Patient Phone Number: [phone] (home)     Last appt: 9/6/2024   Next appt: Visit date not found    Last OARRS:        No data to display

## 2025-01-26 NOTE — ADDENDUM NOTE
Addended by: Pinky Mcnair on: 10/25/2022 12:00 PM     Modules accepted: Orders Arrives ambulatory with daughter c/o diarrhea on and off since Wednesday.  C/o abd pain and bloating occas.  Alert and oriented.

## 2025-02-19 ENCOUNTER — OFFICE VISIT (OUTPATIENT)
Dept: FAMILY MEDICINE CLINIC | Age: 65
End: 2025-02-19

## 2025-02-19 VITALS
HEIGHT: 69 IN | OXYGEN SATURATION: 95 % | DIASTOLIC BLOOD PRESSURE: 80 MMHG | HEART RATE: 59 BPM | WEIGHT: 179 LBS | SYSTOLIC BLOOD PRESSURE: 118 MMHG | BODY MASS INDEX: 26.51 KG/M2

## 2025-02-19 DIAGNOSIS — K55.9 ISCHEMIC COLITIS: ICD-10-CM

## 2025-02-19 DIAGNOSIS — K55.9 ISCHEMIC COLITIS: Primary | ICD-10-CM

## 2025-02-19 LAB
ANION GAP SERPL CALCULATED.3IONS-SCNC: 11 MMOL/L (ref 3–16)
BASOPHILS # BLD: 0 K/UL (ref 0–0.2)
BASOPHILS NFR BLD: 0.5 %
BUN SERPL-MCNC: 13 MG/DL (ref 7–20)
CALCIUM SERPL-MCNC: 10.3 MG/DL (ref 8.3–10.6)
CHLORIDE SERPL-SCNC: 98 MMOL/L (ref 99–110)
CO2 SERPL-SCNC: 26 MMOL/L (ref 21–32)
CREAT SERPL-MCNC: 0.9 MG/DL (ref 0.6–1.2)
DEPRECATED RDW RBC AUTO: 13 % (ref 12.4–15.4)
EOSINOPHIL # BLD: 0.1 K/UL (ref 0–0.6)
EOSINOPHIL NFR BLD: 1.6 %
GFR SERPLBLD CREATININE-BSD FMLA CKD-EPI: 71 ML/MIN/{1.73_M2}
GLUCOSE SERPL-MCNC: 79 MG/DL (ref 70–99)
HCT VFR BLD AUTO: 42.7 % (ref 36–48)
HGB BLD-MCNC: 14.3 G/DL (ref 12–16)
LACTATE BLDV-SCNC: 0.7 MMOL/L (ref 0.4–2)
LYMPHOCYTES # BLD: 1 K/UL (ref 1–5.1)
LYMPHOCYTES NFR BLD: 14.1 %
MCH RBC QN AUTO: 30.6 PG (ref 26–34)
MCHC RBC AUTO-ENTMCNC: 33.6 G/DL (ref 31–36)
MCV RBC AUTO: 91.1 FL (ref 80–100)
MONOCYTES # BLD: 0.5 K/UL (ref 0–1.3)
MONOCYTES NFR BLD: 7.7 %
NEUTROPHILS # BLD: 5.3 K/UL (ref 1.7–7.7)
NEUTROPHILS NFR BLD: 76.1 %
PLATELET # BLD AUTO: 248 K/UL (ref 135–450)
PMV BLD AUTO: 8.1 FL (ref 5–10.5)
POTASSIUM SERPL-SCNC: 4.5 MMOL/L (ref 3.5–5.1)
RBC # BLD AUTO: 4.69 M/UL (ref 4–5.2)
SODIUM SERPL-SCNC: 135 MMOL/L (ref 136–145)
WBC # BLD AUTO: 7 K/UL (ref 4–11)

## 2025-02-19 SDOH — ECONOMIC STABILITY: FOOD INSECURITY: WITHIN THE PAST 12 MONTHS, THE FOOD YOU BOUGHT JUST DIDN'T LAST AND YOU DIDN'T HAVE MONEY TO GET MORE.: NEVER TRUE

## 2025-02-19 SDOH — ECONOMIC STABILITY: FOOD INSECURITY: WITHIN THE PAST 12 MONTHS, YOU WORRIED THAT YOUR FOOD WOULD RUN OUT BEFORE YOU GOT MONEY TO BUY MORE.: NEVER TRUE

## 2025-02-19 ASSESSMENT — PATIENT HEALTH QUESTIONNAIRE - PHQ9
5. POOR APPETITE OR OVEREATING: SEVERAL DAYS
6. FEELING BAD ABOUT YOURSELF - OR THAT YOU ARE A FAILURE OR HAVE LET YOURSELF OR YOUR FAMILY DOWN: NOT AT ALL
1. LITTLE INTEREST OR PLEASURE IN DOING THINGS: NOT AT ALL
SUM OF ALL RESPONSES TO PHQ QUESTIONS 1-9: 4
3. TROUBLE FALLING OR STAYING ASLEEP: SEVERAL DAYS
9. THOUGHTS THAT YOU WOULD BE BETTER OFF DEAD, OR OF HURTING YOURSELF: NOT AT ALL
SUM OF ALL RESPONSES TO PHQ QUESTIONS 1-9: 4
7. TROUBLE CONCENTRATING ON THINGS, SUCH AS READING THE NEWSPAPER OR WATCHING TELEVISION: SEVERAL DAYS
SUM OF ALL RESPONSES TO PHQ QUESTIONS 1-9: 4
4. FEELING TIRED OR HAVING LITTLE ENERGY: SEVERAL DAYS
SUM OF ALL RESPONSES TO PHQ QUESTIONS 1-9: 4
2. FEELING DOWN, DEPRESSED OR HOPELESS: NOT AT ALL
SUM OF ALL RESPONSES TO PHQ9 QUESTIONS 1 & 2: 0
10. IF YOU CHECKED OFF ANY PROBLEMS, HOW DIFFICULT HAVE THESE PROBLEMS MADE IT FOR YOU TO DO YOUR WORK, TAKE CARE OF THINGS AT HOME, OR GET ALONG WITH OTHER PEOPLE: NOT DIFFICULT AT ALL
8. MOVING OR SPEAKING SO SLOWLY THAT OTHER PEOPLE COULD HAVE NOTICED. OR THE OPPOSITE, BEING SO FIGETY OR RESTLESS THAT YOU HAVE BEEN MOVING AROUND A LOT MORE THAN USUAL: NOT AT ALL

## 2025-02-19 NOTE — PROGRESS NOTES
Paz Jefferson (:  1960) is a 64 y.o. female,Established patient, here for evaluation of the following chief complaint(s):  Other (Severe abdominal pain, no blood in stool, almost pass out)      Assessment & Plan   ASSESSMENT/PLAN:  Paz was seen today for other.    Diagnoses and all orders for this visit:    Ischemic colitis  -     CBC with Auto Differential; Future  -     Basic Metabolic Panel; Future  -     Lactic Acid; Future    Flare resolving. Resume asa and rechecking blood work.  Will follow up with GI     No follow-ups on file.         Subjective   SUBJECTIVE/OBJECTIVE:  HPI  Pt is a of 64 y.o. female comes in today with   Chief Complaint   Patient presents with    Other     Severe abdominal pain, no blood in stool, almost pass out     Thinks she had a recurrence of ischemic colitis last Saturday.  Had just taken a shower. No recent po or exertion.  Extreme pain for 15 minutes; was on the floor. Some relief after bm.  Had some blood; not as much as 1st attack 2023.  Tired, dizzy; feels a little foggy.  Prp injection left wrist.  Vitals:    25 0840   BP: 118/80   Pulse: 59   SpO2: 95%   Weight: 81.2 kg (179 lb)   Height: 1.753 m (5' 9\")       Past Medical History:Reviewed  Medications:Reviewed.  Allergies   Allergen Reactions    Penicillins Hives     Bad rash all over    Sulfa Antibiotics Hives      Social hx:Reviewed.  Social History     Tobacco Use   Smoking Status Never   Smokeless Tobacco Never   Tobacco Comments    Back in high school        Review of Systems       Objective   Physical Exam  Constitutional:       Appearance: Normal appearance.   Cardiovascular:      Rate and Rhythm: Normal rate and regular rhythm.      Heart sounds: No murmur heard.  Pulmonary:      Effort: Pulmonary effort is normal.      Breath sounds: Normal breath sounds.   Abdominal:      General: There is no distension.      Palpations: Abdomen is soft.      Tenderness: There is no abdominal tenderness.

## 2025-04-24 NOTE — TELEPHONE ENCOUNTER
Charline from [facility] is calling in to have the CT Chest,Abdomen and Pelvis changed to just without contrast. Please refax the order to   [phone]. The patient is scheduled for tomorrow.     68

## 2025-07-14 DIAGNOSIS — B00.1 RECURRENT COLD SORES: ICD-10-CM

## 2025-07-14 RX ORDER — VALACYCLOVIR HYDROCHLORIDE 1 G/1
1000 TABLET, FILM COATED ORAL DAILY
Qty: 90 TABLET | Refills: 3 | Status: SHIPPED | OUTPATIENT
Start: 2025-07-14

## 2025-07-14 NOTE — TELEPHONE ENCOUNTER
Medication:   Requested Prescriptions     Pending Prescriptions Disp Refills    valACYclovir (VALTREX) 1 g tablet 90 tablet 3     Sig: Take 1 tablet by mouth daily       Last Filled:  11/14/23    Patient Phone Number: [phone] (home)     Last appt: 2/19/2025   Next appt: 9/8/2025    Last Labs DM: No results found for: \"LABA1C\"  Last Lipid:   Lab Results   Component Value Date/Time    CHOL 202 09/09/2024 08:26 AM    TRIG 158 09/09/2024 08:26 AM    HDL 41 09/09/2024 08:26 AM    HDL 42 02/17/2012 07:42 AM     Last PSA: No results found for: \"PSA\"  Last Thyroid:   Lab Results   Component Value Date/Time    TSH 2.36 08/28/2023 12:57 PM    T4FREE 1.1 08/21/2013 01:38 PM

## 2025-07-16 ENCOUNTER — HOSPITAL ENCOUNTER (OUTPATIENT)
Dept: MAMMOGRAPHY | Age: 65
Discharge: HOME | End: 2025-07-16
Payer: COMMERCIAL

## 2025-07-16 VITALS — HEIGHT: 69 IN | WEIGHT: 179 LBS | BODY MASS INDEX: 26.51 KG/M2

## 2025-07-16 DIAGNOSIS — Z12.31 VISIT FOR SCREENING MAMMOGRAM: ICD-10-CM

## 2025-07-16 PROCEDURE — 77063 BREAST TOMOSYNTHESIS BI: CPT

## (undated) DEVICE — FORCEPS BX L240CM JAW DIA2.4MM ORNG L CAP W/ NDL DISP RAD

## (undated) DEVICE — GENERAL: Brand: MEDLINE INDUSTRIES, INC.

## (undated) DEVICE — CANNULA SAMP CO2 AD GRN 7FT CO2 AND 7FT O2 TBNG UNIV CONN

## (undated) DEVICE — SUTURE VCRL SZ 3-0 L27IN ABSRB UD L26MM SH 1/2 CIR J416H

## (undated) DEVICE — SNARE COLD DIAMOND 10MM THIN

## (undated) DEVICE — APPLICATOR PREP 26ML 0.7% IOD POVACRYLEX 74% ISO ALC ST

## (undated) DEVICE — FORCEPS BX L240CM JAW DIA2.8MM L CAP W/ NDL MIC MESH TOOTH

## (undated) DEVICE — TOWEL,STOP FLAG GOLD N-W: Brand: MEDLINE

## (undated) DEVICE — TRAP SPEC RETRV CLR PLAS POLYP IN LN SUCT QUIK CTCH

## (undated) DEVICE — GLOVE SURG SZ 7 L12IN FNGR THK75MIL WHT LTX POLYMER BEAD

## (undated) DEVICE — ADHESIVE SKIN CLSR 0.7ML TOP DERMBND ADV

## (undated) DEVICE — E-Z CLEAN, NON-STICK, PTFE COATED, ELECTROSURGICAL BLADE ELECTRODE, MODIFIED EXTENDED INSULATION, 2.5 INCH (6.35 CM): Brand: MEGADYNE

## (undated) DEVICE — CLEANER,CAUTERY TIP,2X2",STERILE: Brand: MEDLINE